# Patient Record
Sex: FEMALE | Race: WHITE | NOT HISPANIC OR LATINO | Employment: FULL TIME | URBAN - METROPOLITAN AREA
[De-identification: names, ages, dates, MRNs, and addresses within clinical notes are randomized per-mention and may not be internally consistent; named-entity substitution may affect disease eponyms.]

---

## 2023-10-30 PROBLEM — G47.30 SLEEP APNEA: Status: ACTIVE | Noted: 2023-10-30

## 2023-10-30 PROBLEM — D12.6 TUBULAR ADENOMA OF COLON: Status: ACTIVE | Noted: 2023-10-30

## 2023-10-30 PROBLEM — E03.9 HYPOTHYROIDISM: Status: ACTIVE | Noted: 2023-10-30

## 2023-10-30 PROBLEM — G40.909 SEIZURE DISORDER (MULTI): Status: ACTIVE | Noted: 2023-10-30

## 2023-10-30 PROBLEM — E55.9 VITAMIN D DEFICIENCY: Status: ACTIVE | Noted: 2023-10-30

## 2023-10-30 PROBLEM — E78.5 HYPERLIPIDEMIA: Status: ACTIVE | Noted: 2023-10-30

## 2023-10-30 RX ORDER — ACETAMINOPHEN 500 MG
2000 TABLET ORAL DAILY
COMMUNITY
Start: 2018-02-19 | End: 2023-11-07

## 2023-11-07 ENCOUNTER — OFFICE VISIT (OUTPATIENT)
Dept: OBSTETRICS AND GYNECOLOGY | Facility: CLINIC | Age: 63
End: 2023-11-07
Payer: COMMERCIAL

## 2023-11-07 VITALS
DIASTOLIC BLOOD PRESSURE: 74 MMHG | BODY MASS INDEX: 29.82 KG/M2 | WEIGHT: 190 LBS | HEIGHT: 67 IN | SYSTOLIC BLOOD PRESSURE: 136 MMHG

## 2023-11-07 DIAGNOSIS — Z12.11 SCREEN FOR COLON CANCER: Primary | ICD-10-CM

## 2023-11-07 DIAGNOSIS — Z01.419 ENCOUNTER FOR GYNECOLOGICAL EXAMINATION WITHOUT ABNORMAL FINDING: ICD-10-CM

## 2023-11-07 DIAGNOSIS — Z78.0 MENOPAUSE: ICD-10-CM

## 2023-11-07 PROCEDURE — 1036F TOBACCO NON-USER: CPT | Performed by: OBSTETRICS & GYNECOLOGY

## 2023-11-07 PROCEDURE — 99396 PREV VISIT EST AGE 40-64: CPT | Performed by: OBSTETRICS & GYNECOLOGY

## 2023-11-07 RX ORDER — CALCIUM CARBONATE 600 MG
1 TABLET ORAL DAILY
COMMUNITY
Start: 2018-02-19

## 2023-11-07 RX ORDER — UBIDECARENONE 30 MG
CAPSULE ORAL
COMMUNITY

## 2023-11-07 RX ORDER — DIVALPROEX SODIUM 250 MG/1
TABLET, DELAYED RELEASE ORAL
COMMUNITY
Start: 2007-06-18 | End: 2023-11-16 | Stop reason: SDUPTHER

## 2023-11-07 NOTE — PROGRESS NOTES
Subjective   Sofya Dean is a 63 y.o. female here for a routine exam.  She has a history of osteoporosis.  She denies postmenopausal bleeding or vaginal discharge.  No dysuria or change in bowel habits.  She is due for colonoscopy next year.     personal health questionnaire reviewed: not asked.     Gynecologic History  No LMP recorded. Patient is postmenopausal.  Contraception: post menopausal status  Last Pap: 22. Results were: normal  Last mammogram: 22. Results were: normal    Obstetric History  OB History    Para Term  AB Living   3 2           SAB IAB Ectopic Multiple Live Births                  # Outcome Date GA Lbr Dipesh/2nd Weight Sex Delivery Anes PTL Lv   3             2 Para            1 Para                Objective   Constitutional: Alert and in no acute distress. Well developed, well nourished.   Head and Face: Head and face: Normal.    Eyes: Normal external exam - nonicteric sclera, extraocular movements intact (EOMI) and no ptosis.   Neck: No neck asymmetry. Supple. Thyroid not enlarged and there were no palpable thyroid nodules.    Pulmonary: No respiratory distress.   Chest: Breasts: Normal appearance, no nipple discharge and no skin changes. Palpation of breasts and axillae: No palpable mass and no axillary lymphadenopathy.  Bilateral breast implants noted.  Abdomen: Soft nontender; no abdominal mass palpated. No organomegaly. No hernias.   Genitourinary: External genitalia: Normal. No inguinal lymphadenopathy. Bartholin's Urethral and Skenes Glands: Normal. Urethra: Normal.  Bladder: Normal on palpation. Vagina: Normal. Cervix: Normal. No pap sent. Uterus: Normal.  Right Adnexa/parametria: Normal.  Left Adnexa/parametria: Normal.  Inspection of Perianal Area: Normal.   Musculoskeletal: No joint swelling seen, normal movements of all extremities.   Skin: Normal skin color and pigmentation, normal skin turgor, and no rash.   Neurologic: Non-focal. Grossly intact.    Psychiatric: Alert and oriented x 3. Affect normal to patient baseline. Mood: Appropriate.  Physical Exam     Assessment/Plan   Healthy female exam.  This is a 63-year-old female with a normal exam.  No Pap was sent, she is high risk HPV negative.  Her routine mammogram was ordered with tomosynthesis.  She is due for a colonoscopy and bone density in May 2024 and these were ordered.  I will see her in 1 year.  Mammogram ordered.

## 2023-11-16 DIAGNOSIS — G40.909 SEIZURE DISORDER (MULTI): Primary | ICD-10-CM

## 2023-11-16 RX ORDER — DIVALPROEX SODIUM 250 MG/1
TABLET, DELAYED RELEASE ORAL
Qty: 90 TABLET | Refills: 11 | Status: SHIPPED | OUTPATIENT
Start: 2023-11-16 | End: 2023-11-16 | Stop reason: ALTCHOICE

## 2023-11-16 RX ORDER — DIVALPROEX SODIUM 250 MG/1
TABLET, FILM COATED, EXTENDED RELEASE ORAL
COMMUNITY
End: 2023-11-16 | Stop reason: SDUPTHER

## 2023-11-16 RX ORDER — DIVALPROEX SODIUM 250 MG/1
TABLET, FILM COATED, EXTENDED RELEASE ORAL
Qty: 90 TABLET | Refills: 11 | Status: SHIPPED | OUTPATIENT
Start: 2023-11-16 | End: 2023-12-19 | Stop reason: SDUPTHER

## 2023-12-19 DIAGNOSIS — G40.909 SEIZURE DISORDER (MULTI): ICD-10-CM

## 2023-12-19 RX ORDER — DIVALPROEX SODIUM 250 MG/1
TABLET, FILM COATED, EXTENDED RELEASE ORAL
Qty: 270 TABLET | Refills: 3 | Status: SHIPPED | OUTPATIENT
Start: 2023-12-19 | End: 2024-12-18

## 2024-01-09 ENCOUNTER — TELEPHONE (OUTPATIENT)
Dept: OBSTETRICS AND GYNECOLOGY | Facility: CLINIC | Age: 64
End: 2024-01-09

## 2024-01-09 DIAGNOSIS — Z12.31 SCREENING MAMMOGRAM FOR BREAST CANCER: Primary | ICD-10-CM

## 2024-01-09 NOTE — TELEPHONE ENCOUNTER
Patients need another requisition for a mammogram. States that one she has will  before she is able to get in for visit.

## 2024-01-16 ENCOUNTER — HOSPITAL ENCOUNTER (OUTPATIENT)
Dept: RADIOLOGY | Facility: HOSPITAL | Age: 64
Discharge: HOME | End: 2024-01-16
Payer: COMMERCIAL

## 2024-01-16 DIAGNOSIS — Z01.419 ENCOUNTER FOR GYNECOLOGICAL EXAMINATION WITHOUT ABNORMAL FINDING: ICD-10-CM

## 2024-01-16 PROCEDURE — 77067 SCR MAMMO BI INCL CAD: CPT | Performed by: RADIOLOGY

## 2024-01-16 PROCEDURE — 77063 BREAST TOMOSYNTHESIS BI: CPT | Performed by: RADIOLOGY

## 2024-01-16 PROCEDURE — 77067 SCR MAMMO BI INCL CAD: CPT

## 2024-04-12 DIAGNOSIS — Z12.11 COLON CANCER SCREENING: ICD-10-CM

## 2024-04-12 RX ORDER — POLYETHYLENE GLYCOL 3350, SODIUM SULFATE ANHYDROUS, SODIUM BICARBONATE, SODIUM CHLORIDE, POTASSIUM CHLORIDE 236; 22.74; 6.74; 5.86; 2.97 G/4L; G/4L; G/4L; G/4L; G/4L
POWDER, FOR SOLUTION ORAL
Qty: 4000 ML | Refills: 0 | Status: SHIPPED | OUTPATIENT
Start: 2024-04-12

## 2024-05-29 DIAGNOSIS — Z12.11 COLON CANCER SCREENING: Primary | ICD-10-CM

## 2024-05-29 RX ORDER — SODIUM, POTASSIUM,MAG SULFATES 17.5-3.13G
SOLUTION, RECONSTITUTED, ORAL ORAL
Qty: 354 ML | Refills: 0 | Status: SHIPPED | OUTPATIENT
Start: 2024-05-29

## 2024-06-04 ENCOUNTER — TELEPHONE (OUTPATIENT)
Dept: GASTROENTEROLOGY | Facility: HOSPITAL | Age: 64
End: 2024-06-04
Payer: COMMERCIAL

## 2024-06-04 NOTE — TELEPHONE ENCOUNTER
----- Message from Lindsey Lind MA sent at 6/4/2024  9:43 AM EDT -----  Regarding: Prep  Patient has colonoscopy tomorrow, was given Suprep, she is reading the insert and it talks about taking it with seizure disorder could be a problem and she is epileptic.  Asking if ok to take.....   [phone]      I discussed the bowel preparation for colonoscopy and stated that any of the preparations can cause dehydration, low sodium, potassium and potential seizures.  I have highly recommended that she drink plenty of fluids today and remain hydrated.  She will split the preparation as scheduled.

## 2024-06-05 ENCOUNTER — OFFICE VISIT (OUTPATIENT)
Dept: GASTROENTEROLOGY | Facility: EXTERNAL LOCATION | Age: 64
End: 2024-06-05
Payer: MEDICARE

## 2024-06-05 DIAGNOSIS — Z12.11 SCREEN FOR COLON CANCER: ICD-10-CM

## 2024-06-05 DIAGNOSIS — Z86.010 HX OF COLONIC POLYPS: ICD-10-CM

## 2024-06-05 DIAGNOSIS — Z12.11 SCREEN FOR COLON CANCER: Primary | ICD-10-CM

## 2024-06-05 DIAGNOSIS — D12.3 BENIGN NEOPLASM OF TRANSVERSE COLON: ICD-10-CM

## 2024-06-05 PROCEDURE — 45385 COLONOSCOPY W/LESION REMOVAL: CPT | Performed by: INTERNAL MEDICINE

## 2024-06-05 PROCEDURE — 88305 TISSUE EXAM BY PATHOLOGIST: CPT

## 2024-06-05 NOTE — PROGRESS NOTES
Patient colonoscopy for surveillance and 2 polyps were removed.  Diverticulosis and hemorrhoids were present.  Repeat exam 5 years.

## 2024-06-06 ENCOUNTER — LAB REQUISITION (OUTPATIENT)
Dept: LAB | Facility: HOSPITAL | Age: 64
End: 2024-06-06
Payer: MEDICARE

## 2024-06-12 LAB
LABORATORY COMMENT REPORT: NORMAL
PATH REPORT.FINAL DX SPEC: NORMAL
PATH REPORT.GROSS SPEC: NORMAL
PATH REPORT.RELEVANT HX SPEC: NORMAL
PATH REPORT.TOTAL CANCER: NORMAL

## 2024-07-03 ENCOUNTER — APPOINTMENT (OUTPATIENT)
Dept: PRIMARY CARE | Facility: CLINIC | Age: 64
End: 2024-07-03
Payer: MEDICARE

## 2024-07-03 VITALS
WEIGHT: 199.3 LBS | BODY MASS INDEX: 31.28 KG/M2 | OXYGEN SATURATION: 97 % | HEART RATE: 75 BPM | SYSTOLIC BLOOD PRESSURE: 130 MMHG | HEIGHT: 67 IN | DIASTOLIC BLOOD PRESSURE: 72 MMHG

## 2024-07-03 DIAGNOSIS — Z00.00 ROUTINE GENERAL MEDICAL EXAMINATION AT HEALTH CARE FACILITY: Primary | ICD-10-CM

## 2024-07-03 DIAGNOSIS — Z13.6 SCREENING FOR CARDIOVASCULAR CONDITION: ICD-10-CM

## 2024-07-03 DIAGNOSIS — E78.2 MIXED HYPERLIPIDEMIA: ICD-10-CM

## 2024-07-03 DIAGNOSIS — Z00.00 MEDICARE ANNUAL WELLNESS VISIT, INITIAL: ICD-10-CM

## 2024-07-03 PROCEDURE — 99397 PER PM REEVAL EST PAT 65+ YR: CPT | Performed by: PHYSICIAN ASSISTANT

## 2024-07-03 PROCEDURE — 1160F RVW MEDS BY RX/DR IN RCRD: CPT | Performed by: PHYSICIAN ASSISTANT

## 2024-07-03 PROCEDURE — 1170F FXNL STATUS ASSESSED: CPT | Performed by: PHYSICIAN ASSISTANT

## 2024-07-03 PROCEDURE — 1036F TOBACCO NON-USER: CPT | Performed by: PHYSICIAN ASSISTANT

## 2024-07-03 PROCEDURE — G0402 INITIAL PREVENTIVE EXAM: HCPCS | Performed by: PHYSICIAN ASSISTANT

## 2024-07-03 PROCEDURE — G0403 EKG FOR INITIAL PREVENT EXAM: HCPCS | Performed by: PHYSICIAN ASSISTANT

## 2024-07-03 PROCEDURE — 1159F MED LIST DOCD IN RCRD: CPT | Performed by: PHYSICIAN ASSISTANT

## 2024-07-03 RX ORDER — ROSUVASTATIN CALCIUM 10 MG/1
10 TABLET, COATED ORAL DAILY
Qty: 100 TABLET | Refills: 3 | Status: SHIPPED | OUTPATIENT
Start: 2024-07-03 | End: 2025-08-07

## 2024-07-03 ASSESSMENT — ENCOUNTER SYMPTOMS
MUSCULOSKELETAL NEGATIVE: 1
VOMITING: 0
ALLERGIC/IMMUNOLOGIC NEGATIVE: 1
ABDOMINAL PAIN: 0
LOSS OF SENSATION IN FEET: 0
PSYCHIATRIC NEGATIVE: 1
RESPIRATORY NEGATIVE: 1
PALPITATIONS: 0
EYES NEGATIVE: 1
NAUSEA: 0
NERVOUS/ANXIOUS: 0
CONSTITUTIONAL NEGATIVE: 1
HEADACHES: 0
OCCASIONAL FEELINGS OF UNSTEADINESS: 0
COUGH: 0
NEUROLOGICAL NEGATIVE: 1
DIZZINESS: 0
SHORTNESS OF BREATH: 0
ENDOCRINE NEGATIVE: 1
WHEEZING: 0
ARTHRALGIAS: 0
HEMATOLOGIC/LYMPHATIC NEGATIVE: 1
DEPRESSION: 0
BACK PAIN: 0

## 2024-07-03 ASSESSMENT — PATIENT HEALTH QUESTIONNAIRE - PHQ9
2. FEELING DOWN, DEPRESSED OR HOPELESS: NOT AT ALL
1. LITTLE INTEREST OR PLEASURE IN DOING THINGS: NOT AT ALL
2. FEELING DOWN, DEPRESSED OR HOPELESS: NOT AT ALL
1. LITTLE INTEREST OR PLEASURE IN DOING THINGS: NOT AT ALL
SUM OF ALL RESPONSES TO PHQ9 QUESTIONS 1 AND 2: 0
SUM OF ALL RESPONSES TO PHQ9 QUESTIONS 1 AND 2: 0

## 2024-07-03 ASSESSMENT — ACTIVITIES OF DAILY LIVING (ADL)
MANAGING_FINANCES: INDEPENDENT
DRESSING: INDEPENDENT
BATHING: INDEPENDENT
TAKING_MEDICATION: INDEPENDENT
DOING_HOUSEWORK: INDEPENDENT
GROCERY_SHOPPING: INDEPENDENT

## 2024-07-03 NOTE — PROGRESS NOTES
Subjective   Reason for Visit: Sofya Dean is an 64 y.o. female here for a Medicare Wellness visit.     Past Medical, Surgical, and Family History reviewed and updated in chart.    Reviewed all medications by prescribing practitioner or clinical pharmacist (such as prescriptions, OTCs, herbal therapies and supplements) and documented in the medical record.    HPI    Patient Sofya Dean 64 y.o. female is here today to establish care and medicare wellness initial     previous PCP  starr      - works part time window business-  retired [employer]     specialists - gyn - jerod- neurology - epilepsy   UTD dental and vision UTD     PMhx significant medical hx epilepsy -   PShx: breast implants   Social hx: etoh- social-few per month  , no tobacco use, no illicit drug use   Watches diet and exercise- walks   immunizations -pt declines all vaccines -- pt aware of risks   Past medical, surgical, social, and family history all reviewed     patient states feeling well otherwise  denies fever, chills, N/V, headache, dizziness, CP, SOB, palpitations, edema, numbness, tingling, weakness  A chaperone was offered to the patient for the physical exam /  exam and was declined     Colonoscopuy UTD 6/5/2024  Mammogram UTD     Patient Care Team:  Princess Aguiar PA-C as PCP - General (Internal Medicine)  JOURDAN Contreras-CNP as PCP - [employer] PCP     Review of Systems   Constitutional: Negative.    HENT: Negative.     Eyes: Negative.    Respiratory: Negative.  Negative for cough, shortness of breath and wheezing.    Cardiovascular:  Negative for chest pain and palpitations.   Gastrointestinal:  Negative for abdominal pain, nausea and vomiting.   Endocrine: Negative.    Genitourinary: Negative.    Musculoskeletal: Negative.  Negative for arthralgias and back pain.   Skin: Negative.  Negative for rash.   Allergic/Immunologic: Negative.    Neurological: Negative.  Negative for  "dizziness and headaches.        Hx epilepsy    Hematological: Negative.    Psychiatric/Behavioral: Negative.  The patient is not nervous/anxious.        Objective   Vitals:  /72 (BP Location: Right arm, Patient Position: Sitting)   Pulse 75   Ht 1.702 m (5' 7\")   Wt 90.4 kg (199 lb 4.7 oz)   SpO2 97%   BMI 31.21 kg/m²       Physical Exam  Vitals and nursing note reviewed.   Constitutional:       Appearance: Normal appearance. She is normal weight.   HENT:      Head: Normocephalic and atraumatic.      Right Ear: Tympanic membrane, ear canal and external ear normal.      Left Ear: Tympanic membrane, ear canal and external ear normal.      Nose: Nose normal.      Mouth/Throat:      Mouth: Mucous membranes are moist.      Pharynx: Oropharynx is clear.   Eyes:      Extraocular Movements: Extraocular movements intact.      Pupils: Pupils are equal, round, and reactive to light.   Cardiovascular:      Rate and Rhythm: Normal rate and regular rhythm.      Heart sounds: Normal heart sounds.   Pulmonary:      Effort: Pulmonary effort is normal.      Breath sounds: Normal breath sounds.   Abdominal:      General: Abdomen is flat. Bowel sounds are normal.      Palpations: Abdomen is soft.   Genitourinary:     Comments: Per GYN   Musculoskeletal:         General: Normal range of motion.      Cervical back: Normal range of motion and neck supple.   Skin:     General: Skin is warm and dry.   Neurological:      General: No focal deficit present.      Mental Status: She is alert and oriented to person, place, and time.   Psychiatric:         Mood and Affect: Mood normal.         Behavior: Behavior normal.         Thought Content: Thought content normal.         Judgment: Judgment normal.       Assessment/Plan   Problem List Items Addressed This Visit       Hyperlipidemia    Relevant Medications    rosuvastatin (Crestor) 10 mg tablet    Other Relevant Orders    Comprehensive Metabolic Panel     Other Visit Diagnoses       " Routine general medical examination at health care facility    -  Primary    Relevant Orders    1 Year Follow Up In Primary Care - Wellness Exam    Hepatitis C Antibody    TSH with reflex to Free T4 if abnormal    Medicare annual wellness visit, initial        Screening for cardiovascular condition        Relevant Orders    ECG 12 lead    Lipid panel    CT cardiac scoring wo IV contrast          Est/ MCE initial   -  patient stable and healthy  -  discussed healthy diet and exercise plan   -  continue medications as directed, SEs, risks and options discussed   -  f/u with specialists as directed   -  UTD on dental and vision exams, f/u as directed   -  Labs ordered today, pt advised to call office when results are available to discuss with provider    -  EKG ordered today  -Vaccinations recommended today: Influenza, Prevnar, pneumococcal, TDap  -Follow-up annual exam in one year  -Colon cancer screening UTD   - mammogram UTD   -Follow-up in one week to discuss all results      - start crestor 10mg   -Encouraged following a low-fat low-cholesterol diet   -Discussed the benefits of regular aerobic exercise and weight loss  -Encouraged following a low-carb healthy oil intake diet      Medicare Wellness Billing Compliance Satisfied    *This is a visual tool to show completion of required items on the day of the visit. Green checks will only appear on the date of visit.    Review all medications by prescribing practitioner or clinical pharmacist (such as prescriptions, OTCs, herbal therapies and supplements) documented in the medical record    Past Medical, Surgical, and Family History reviewed and updated in chart    Tobacco Use Reviewed    Alcohol Use Reviewed    Illicit Drug Use Reviewed    PHQ2/9    Falls in Last Year Reviewed    Home Safety Risk Factors Reviewed    Cognitive Impairment Reviewed    Patient Self Assessment and Health Status    Current Diet Reviewed    Exercise Frequency    ADL - Hearing  Impairment    ADL - Bathing    ADL - Dressing    ADL - Walks in Home    IADL - Managing Finances    IADL - Grocery Shopping    IADL - Taking Medications    IADL - Doing Housework    Vision Screening       Total appt time today was 45+ minutes. Time included preparing to see the pt, obtaining the hx, performing the medically necessary appropriate physical exam, counseling & educating the pt, ordering tests & procedures, referring & communicating w/other providers, independently interpreting results & communicating the results to the pt, care, coordination & documenting clinical information in the medical record.

## 2024-07-19 ENCOUNTER — LAB (OUTPATIENT)
Dept: LAB | Facility: LAB | Age: 64
End: 2024-07-19
Payer: MEDICARE

## 2024-07-19 ENCOUNTER — HOSPITAL ENCOUNTER (OUTPATIENT)
Dept: RADIOLOGY | Facility: HOSPITAL | Age: 64
Discharge: HOME | End: 2024-07-19
Payer: MEDICARE

## 2024-07-19 DIAGNOSIS — Z00.00 ROUTINE GENERAL MEDICAL EXAMINATION AT HEALTH CARE FACILITY: ICD-10-CM

## 2024-07-19 DIAGNOSIS — Z13.6 SCREENING FOR CARDIOVASCULAR CONDITION: ICD-10-CM

## 2024-07-19 DIAGNOSIS — E78.2 MIXED HYPERLIPIDEMIA: ICD-10-CM

## 2024-07-19 LAB
ALBUMIN SERPL BCP-MCNC: 4.5 G/DL (ref 3.4–5)
ALP SERPL-CCNC: 56 U/L (ref 33–136)
ALT SERPL W P-5'-P-CCNC: 21 U/L (ref 7–45)
ANION GAP SERPL CALC-SCNC: 12 MMOL/L (ref 10–20)
AST SERPL W P-5'-P-CCNC: 17 U/L (ref 9–39)
BILIRUB SERPL-MCNC: 0.4 MG/DL (ref 0–1.2)
BUN SERPL-MCNC: 18 MG/DL (ref 6–23)
CALCIUM SERPL-MCNC: 9.8 MG/DL (ref 8.6–10.3)
CHLORIDE SERPL-SCNC: 104 MMOL/L (ref 98–107)
CHOLEST SERPL-MCNC: 162 MG/DL (ref 0–199)
CHOLESTEROL/HDL RATIO: 3.2
CO2 SERPL-SCNC: 29 MMOL/L (ref 21–32)
CREAT SERPL-MCNC: 0.69 MG/DL (ref 0.5–1.05)
EGFRCR SERPLBLD CKD-EPI 2021: >90 ML/MIN/1.73M*2
GLUCOSE SERPL-MCNC: 89 MG/DL (ref 74–99)
HCV AB SER QL: NONREACTIVE
HDLC SERPL-MCNC: 50.7 MG/DL
LDLC SERPL CALC-MCNC: 85 MG/DL
NON HDL CHOLESTEROL: 111 MG/DL (ref 0–149)
POTASSIUM SERPL-SCNC: 4.7 MMOL/L (ref 3.5–5.3)
PROT SERPL-MCNC: 6.9 G/DL (ref 6.4–8.2)
SODIUM SERPL-SCNC: 140 MMOL/L (ref 136–145)
TRIGL SERPL-MCNC: 130 MG/DL (ref 0–149)
TSH SERPL-ACNC: 2.08 MIU/L (ref 0.44–3.98)
VLDL: 26 MG/DL (ref 0–40)

## 2024-07-19 PROCEDURE — 86803 HEPATITIS C AB TEST: CPT

## 2024-07-19 PROCEDURE — 75571 CT HRT W/O DYE W/CA TEST: CPT

## 2024-07-19 PROCEDURE — 36415 COLL VENOUS BLD VENIPUNCTURE: CPT

## 2024-07-25 ENCOUNTER — TELEPHONE (OUTPATIENT)
Dept: PRIMARY CARE | Facility: CLINIC | Age: 64
End: 2024-07-25
Payer: MEDICARE

## 2024-10-02 DIAGNOSIS — G40.909 SEIZURE DISORDER (MULTI): ICD-10-CM

## 2024-10-02 RX ORDER — DIVALPROEX SODIUM 250 MG/1
TABLET, FILM COATED, EXTENDED RELEASE ORAL
Qty: 270 TABLET | Refills: 3 | Status: SHIPPED | OUTPATIENT
Start: 2024-10-02 | End: 2025-10-02

## 2024-11-11 ENCOUNTER — APPOINTMENT (OUTPATIENT)
Dept: OBSTETRICS AND GYNECOLOGY | Facility: CLINIC | Age: 64
End: 2024-11-11
Payer: MEDICARE

## 2024-11-11 VITALS
WEIGHT: 203 LBS | DIASTOLIC BLOOD PRESSURE: 78 MMHG | SYSTOLIC BLOOD PRESSURE: 130 MMHG | HEART RATE: 83 BPM | BODY MASS INDEX: 31.86 KG/M2 | HEIGHT: 67 IN

## 2024-11-11 DIAGNOSIS — Z01.419 ENCOUNTER FOR GYNECOLOGICAL EXAMINATION WITHOUT ABNORMAL FINDING: ICD-10-CM

## 2024-11-11 DIAGNOSIS — Z12.31 ENCOUNTER FOR SCREENING MAMMOGRAM FOR MALIGNANT NEOPLASM OF BREAST: ICD-10-CM

## 2024-11-11 DIAGNOSIS — M81.0 AGE-RELATED OSTEOPOROSIS WITHOUT CURRENT PATHOLOGICAL FRACTURE: Primary | ICD-10-CM

## 2024-11-11 PROCEDURE — G2211 COMPLEX E/M VISIT ADD ON: HCPCS | Performed by: OBSTETRICS & GYNECOLOGY

## 2024-11-11 PROCEDURE — 3008F BODY MASS INDEX DOCD: CPT | Performed by: OBSTETRICS & GYNECOLOGY

## 2024-11-11 PROCEDURE — 99214 OFFICE O/P EST MOD 30 MIN: CPT | Performed by: OBSTETRICS & GYNECOLOGY

## 2024-11-11 PROCEDURE — 1159F MED LIST DOCD IN RCRD: CPT | Performed by: OBSTETRICS & GYNECOLOGY

## 2024-11-11 NOTE — PROGRESS NOTES
Subjective   Sofya Dean is a 64 y.o. female here for GYN care.  She has no postmenopausal bleeding or discharge.  No dysuria or change in bowel habits.  She is current on her colonoscopy.    She had a bone density in 2017 that showed osteoporosis.    Personal health questionnaire reviewed: yes.     Gynecologic History  No LMP recorded. Patient is postmenopausal.  Contraception: Ortho-Evra patches weekly and post menopausal status  Last Pap: 22. Results were: normal  Last mammogram: 24. Results were: normal    Obstetric History  OB History    Para Term  AB Living   3 2 2         SAB IAB Ectopic Multiple Live Births                  # Outcome Date GA Lbr Dipesh/2nd Weight Sex Type Anes PTL Lv   3             2 Term            1 Term                Objective   Constitutional: Alert and in no acute distress. Well developed, well nourished.   Head and Face: Head and face: Normal.    Eyes: Normal external exam - nonicteric sclera, extraocular movements intact (EOMI) and no ptosis.   Neck: No neck asymmetry. Supple. Thyroid not enlarged and there were no palpable thyroid nodules.    Pulmonary: No respiratory distress.   Chest: Breasts: Normal appearance, no nipple discharge and no skin changes. Palpation of breasts and axillae: No palpable mass and no axillary lymphadenopathy.   Abdomen: Soft nontender; no abdominal mass palpated. No organomegaly. No hernias.   Genitourinary: External genitalia: Normal. No inguinal lymphadenopathy. Bartholin's Urethral and Skenes Glands: Normal. Urethra: Normal.  Bladder: Normal on palpation. Vagina: Normal. Cervix: Normal.  Uterus: Normal.  Right Adnexa/parametria: Normal.  Left Adnexa/parametria: Normal.  Inspection of Perianal Area: Normal.   Musculoskeletal: No joint swelling seen, normal movements of all extremities.   Skin: Normal skin color and pigmentation, normal skin turgor, and no rash.   Neurologic: Non-focal. Grossly intact.   Psychiatric:  Alert and oriented x 3. Affect normal to patient baseline. Mood: Appropriate.  Physical Exam     Assessment/Plan   This is a 64-year-old female with a normal exam.  No Pap smear was sent, she is high risk HPV negative in 2022.    Her routine mammogram was ordered with tomosynthesis.    I do recommend obtaining a bone density to monitor the osteoporosis.  I recommend dietary calcium, vitamin D supplement and weightbearing exercise for bone health.    I will see her in 2 years, or sooner as needed.    Mammogram ordered.

## 2024-12-04 ENCOUNTER — HOSPITAL ENCOUNTER (INPATIENT)
Facility: HOSPITAL | Age: 64
LOS: 2 days | Discharge: HOME | End: 2024-12-06
Attending: INTERNAL MEDICINE | Admitting: INTERNAL MEDICINE
Payer: MEDICARE

## 2024-12-04 ENCOUNTER — APPOINTMENT (OUTPATIENT)
Dept: RADIOLOGY | Facility: HOSPITAL | Age: 64
End: 2024-12-04
Payer: MEDICARE

## 2024-12-04 ENCOUNTER — TELEPHONE (OUTPATIENT)
Dept: PRIMARY CARE | Facility: CLINIC | Age: 64
End: 2024-12-04
Payer: MEDICARE

## 2024-12-04 DIAGNOSIS — N20.0 URINARY TRACT OBSTRUCTION BY KIDNEY STONE: Primary | ICD-10-CM

## 2024-12-04 DIAGNOSIS — N13.8 URINARY TRACT OBSTRUCTION BY KIDNEY STONE: Primary | ICD-10-CM

## 2024-12-04 DIAGNOSIS — N20.0 NEPHROLITHIASIS: ICD-10-CM

## 2024-12-04 DIAGNOSIS — N12 PYELONEPHRITIS: ICD-10-CM

## 2024-12-04 LAB
ALBUMIN SERPL BCP-MCNC: 4.5 G/DL (ref 3.4–5)
ALP SERPL-CCNC: 49 U/L (ref 33–136)
ALT SERPL W P-5'-P-CCNC: 15 U/L (ref 7–45)
ANION GAP SERPL CALC-SCNC: 12 MMOL/L (ref 10–20)
APPEARANCE UR: ABNORMAL
AST SERPL W P-5'-P-CCNC: 14 U/L (ref 9–39)
BASOPHILS # BLD AUTO: 0.03 X10*3/UL (ref 0–0.1)
BASOPHILS NFR BLD AUTO: 0.3 %
BILIRUB SERPL-MCNC: 0.4 MG/DL (ref 0–1.2)
BILIRUB UR STRIP.AUTO-MCNC: NEGATIVE MG/DL
BUN SERPL-MCNC: 25 MG/DL (ref 6–23)
CALCIUM SERPL-MCNC: 11.5 MG/DL (ref 8.6–10.3)
CHLORIDE SERPL-SCNC: 105 MMOL/L (ref 98–107)
CO2 SERPL-SCNC: 29 MMOL/L (ref 21–32)
COLOR UR: YELLOW
CREAT SERPL-MCNC: 0.78 MG/DL (ref 0.5–1.05)
EGFRCR SERPLBLD CKD-EPI 2021: 84 ML/MIN/1.73M*2
EOSINOPHIL # BLD AUTO: 0.2 X10*3/UL (ref 0–0.7)
EOSINOPHIL NFR BLD AUTO: 1.7 %
ERYTHROCYTE [DISTWIDTH] IN BLOOD BY AUTOMATED COUNT: 13.8 % (ref 11.5–14.5)
GLUCOSE SERPL-MCNC: 111 MG/DL (ref 74–99)
GLUCOSE UR STRIP.AUTO-MCNC: NORMAL MG/DL
HCT VFR BLD AUTO: 42.2 % (ref 36–46)
HGB BLD-MCNC: 13.5 G/DL (ref 12–16)
IMM GRANULOCYTES # BLD AUTO: 0.06 X10*3/UL (ref 0–0.7)
IMM GRANULOCYTES NFR BLD AUTO: 0.5 % (ref 0–0.9)
KETONES UR STRIP.AUTO-MCNC: ABNORMAL MG/DL
LACTATE SERPL-SCNC: 1.5 MMOL/L (ref 0.4–2)
LEUKOCYTE ESTERASE UR QL STRIP.AUTO: ABNORMAL
LIPASE SERPL-CCNC: 26 U/L (ref 9–82)
LYMPHOCYTES # BLD AUTO: 2.64 X10*3/UL (ref 1.2–4.8)
LYMPHOCYTES NFR BLD AUTO: 22.8 %
MAGNESIUM SERPL-MCNC: 1.72 MG/DL (ref 1.6–2.4)
MCH RBC QN AUTO: 29.8 PG (ref 26–34)
MCHC RBC AUTO-ENTMCNC: 32 G/DL (ref 32–36)
MCV RBC AUTO: 93 FL (ref 80–100)
MONOCYTES # BLD AUTO: 0.76 X10*3/UL (ref 0.1–1)
MONOCYTES NFR BLD AUTO: 6.6 %
MUCOUS THREADS #/AREA URNS AUTO: ABNORMAL /LPF
NEUTROPHILS # BLD AUTO: 7.87 X10*3/UL (ref 1.2–7.7)
NEUTROPHILS NFR BLD AUTO: 68.1 %
NITRITE UR QL STRIP.AUTO: NEGATIVE
NRBC BLD-RTO: 0 /100 WBCS (ref 0–0)
PH UR STRIP.AUTO: 5.5 [PH]
PLATELET # BLD AUTO: 226 X10*3/UL (ref 150–450)
POTASSIUM SERPL-SCNC: 4.1 MMOL/L (ref 3.5–5.3)
PROT SERPL-MCNC: 7.2 G/DL (ref 6.4–8.2)
PROT UR STRIP.AUTO-MCNC: ABNORMAL MG/DL
RBC # BLD AUTO: 4.53 X10*6/UL (ref 4–5.2)
RBC # UR STRIP.AUTO: ABNORMAL /UL
RBC #/AREA URNS AUTO: >20 /HPF
SODIUM SERPL-SCNC: 142 MMOL/L (ref 136–145)
SP GR UR STRIP.AUTO: 1.02
SQUAMOUS #/AREA URNS AUTO: ABNORMAL /HPF
UROBILINOGEN UR STRIP.AUTO-MCNC: NORMAL MG/DL
WBC # BLD AUTO: 11.6 X10*3/UL (ref 4.4–11.3)
WBC #/AREA URNS AUTO: ABNORMAL /HPF
YEAST BUDDING #/AREA UR COMP ASSIST: PRESENT /HPF

## 2024-12-04 PROCEDURE — 2500000004 HC RX 250 GENERAL PHARMACY W/ HCPCS (ALT 636 FOR OP/ED)

## 2024-12-04 PROCEDURE — 99222 1ST HOSP IP/OBS MODERATE 55: CPT | Performed by: NURSE PRACTITIONER

## 2024-12-04 PROCEDURE — 83735 ASSAY OF MAGNESIUM: CPT | Performed by: INTERNAL MEDICINE

## 2024-12-04 PROCEDURE — 85025 COMPLETE CBC W/AUTO DIFF WBC: CPT | Performed by: INTERNAL MEDICINE

## 2024-12-04 PROCEDURE — 96366 THER/PROPH/DIAG IV INF ADDON: CPT

## 2024-12-04 PROCEDURE — 99285 EMERGENCY DEPT VISIT HI MDM: CPT | Mod: 25 | Performed by: INTERNAL MEDICINE

## 2024-12-04 PROCEDURE — 2500000001 HC RX 250 WO HCPCS SELF ADMINISTERED DRUGS (ALT 637 FOR MEDICARE OP): Performed by: NURSE PRACTITIONER

## 2024-12-04 PROCEDURE — 2500000002 HC RX 250 W HCPCS SELF ADMINISTERED DRUGS (ALT 637 FOR MEDICARE OP, ALT 636 FOR OP/ED)

## 2024-12-04 PROCEDURE — 1100000001 HC PRIVATE ROOM DAILY

## 2024-12-04 PROCEDURE — 87086 URINE CULTURE/COLONY COUNT: CPT | Mod: AHULAB | Performed by: INTERNAL MEDICINE

## 2024-12-04 PROCEDURE — 83690 ASSAY OF LIPASE: CPT | Performed by: INTERNAL MEDICINE

## 2024-12-04 PROCEDURE — 74176 CT ABD & PELVIS W/O CONTRAST: CPT | Performed by: RADIOLOGY

## 2024-12-04 PROCEDURE — 81001 URINALYSIS AUTO W/SCOPE: CPT | Performed by: INTERNAL MEDICINE

## 2024-12-04 PROCEDURE — 83605 ASSAY OF LACTIC ACID: CPT | Performed by: INTERNAL MEDICINE

## 2024-12-04 PROCEDURE — 36415 COLL VENOUS BLD VENIPUNCTURE: CPT | Performed by: INTERNAL MEDICINE

## 2024-12-04 PROCEDURE — 96365 THER/PROPH/DIAG IV INF INIT: CPT

## 2024-12-04 PROCEDURE — 2500000004 HC RX 250 GENERAL PHARMACY W/ HCPCS (ALT 636 FOR OP/ED): Performed by: INTERNAL MEDICINE

## 2024-12-04 PROCEDURE — 87040 BLOOD CULTURE FOR BACTERIA: CPT | Mod: AHULAB | Performed by: INTERNAL MEDICINE

## 2024-12-04 PROCEDURE — 74176 CT ABD & PELVIS W/O CONTRAST: CPT

## 2024-12-04 PROCEDURE — 84075 ASSAY ALKALINE PHOSPHATASE: CPT | Performed by: INTERNAL MEDICINE

## 2024-12-04 PROCEDURE — 99222 1ST HOSP IP/OBS MODERATE 55: CPT

## 2024-12-04 PROCEDURE — 2500000004 HC RX 250 GENERAL PHARMACY W/ HCPCS (ALT 636 FOR OP/ED): Performed by: NURSE PRACTITIONER

## 2024-12-04 RX ORDER — BISMUTH SUBSALICYLATE 262 MG
1 TABLET,CHEWABLE ORAL DAILY
COMMUNITY

## 2024-12-04 RX ORDER — KETOROLAC TROMETHAMINE 30 MG/ML
15 INJECTION, SOLUTION INTRAMUSCULAR; INTRAVENOUS EVERY 6 HOURS PRN
Status: DISCONTINUED | OUTPATIENT
Start: 2024-12-04 | End: 2024-12-06 | Stop reason: HOSPADM

## 2024-12-04 RX ORDER — MORPHINE SULFATE 2 MG/ML
2 INJECTION, SOLUTION INTRAMUSCULAR; INTRAVENOUS EVERY 4 HOURS PRN
Status: DISCONTINUED | OUTPATIENT
Start: 2024-12-04 | End: 2024-12-06 | Stop reason: HOSPADM

## 2024-12-04 RX ORDER — DIVALPROEX SODIUM 250 MG/1
500 TABLET, FILM COATED, EXTENDED RELEASE ORAL DAILY
Status: DISCONTINUED | OUTPATIENT
Start: 2024-12-05 | End: 2024-12-06 | Stop reason: HOSPADM

## 2024-12-04 RX ORDER — SODIUM CHLORIDE 9 MG/ML
125 INJECTION, SOLUTION INTRAVENOUS CONTINUOUS
Status: ACTIVE | OUTPATIENT
Start: 2024-12-04 | End: 2024-12-05

## 2024-12-04 RX ORDER — CIPROFLOXACIN 2 MG/ML
400 INJECTION, SOLUTION INTRAVENOUS EVERY 12 HOURS
Status: DISCONTINUED | OUTPATIENT
Start: 2024-12-05 | End: 2024-12-06

## 2024-12-04 RX ORDER — ONDANSETRON HYDROCHLORIDE 2 MG/ML
4 INJECTION, SOLUTION INTRAVENOUS EVERY 6 HOURS PRN
Status: DISCONTINUED | OUTPATIENT
Start: 2024-12-04 | End: 2024-12-06 | Stop reason: HOSPADM

## 2024-12-04 RX ORDER — ENOXAPARIN SODIUM 100 MG/ML
40 INJECTION SUBCUTANEOUS EVERY 24 HOURS
Status: DISCONTINUED | OUTPATIENT
Start: 2024-12-04 | End: 2024-12-06 | Stop reason: HOSPADM

## 2024-12-04 RX ORDER — TAMSULOSIN HYDROCHLORIDE 0.4 MG/1
0.4 CAPSULE ORAL DAILY
Status: DISCONTINUED | OUTPATIENT
Start: 2024-12-04 | End: 2024-12-06 | Stop reason: HOSPADM

## 2024-12-04 RX ORDER — ACETAMINOPHEN 650 MG/1
650 SUPPOSITORY RECTAL EVERY 4 HOURS PRN
Status: DISCONTINUED | OUTPATIENT
Start: 2024-12-04 | End: 2024-12-06 | Stop reason: HOSPADM

## 2024-12-04 RX ORDER — ACETAMINOPHEN 325 MG/1
650 TABLET ORAL EVERY 4 HOURS PRN
Status: DISCONTINUED | OUTPATIENT
Start: 2024-12-04 | End: 2024-12-06 | Stop reason: HOSPADM

## 2024-12-04 RX ORDER — ACETAMINOPHEN 160 MG/5ML
650 SOLUTION ORAL EVERY 4 HOURS PRN
Status: DISCONTINUED | OUTPATIENT
Start: 2024-12-04 | End: 2024-12-06 | Stop reason: HOSPADM

## 2024-12-04 RX ORDER — DIVALPROEX SODIUM 250 MG/1
250 TABLET, FILM COATED, EXTENDED RELEASE ORAL NIGHTLY
Status: DISCONTINUED | OUTPATIENT
Start: 2024-12-04 | End: 2024-12-06 | Stop reason: HOSPADM

## 2024-12-04 RX ORDER — CIPROFLOXACIN 2 MG/ML
400 INJECTION, SOLUTION INTRAVENOUS ONCE
Status: COMPLETED | OUTPATIENT
Start: 2024-12-04 | End: 2024-12-04

## 2024-12-04 SDOH — SOCIAL STABILITY: SOCIAL INSECURITY: WERE YOU ABLE TO COMPLETE ALL THE BEHAVIORAL HEALTH SCREENINGS?: YES

## 2024-12-04 SDOH — SOCIAL STABILITY: SOCIAL INSECURITY
WITHIN THE LAST YEAR, HAVE YOU BEEN RAPED OR FORCED TO HAVE ANY KIND OF SEXUAL ACTIVITY BY YOUR PARTNER OR EX-PARTNER?: NO

## 2024-12-04 SDOH — SOCIAL STABILITY: SOCIAL INSECURITY: HAVE YOU HAD THOUGHTS OF HARMING ANYONE ELSE?: NO

## 2024-12-04 SDOH — ECONOMIC STABILITY: FOOD INSECURITY: WITHIN THE PAST 12 MONTHS, THE FOOD YOU BOUGHT JUST DIDN'T LAST AND YOU DIDN'T HAVE MONEY TO GET MORE.: NEVER TRUE

## 2024-12-04 SDOH — SOCIAL STABILITY: SOCIAL INSECURITY: HAS ANYONE EVER THREATENED TO HURT YOUR FAMILY OR YOUR PETS?: NO

## 2024-12-04 SDOH — ECONOMIC STABILITY: FOOD INSECURITY: WITHIN THE PAST 12 MONTHS, YOU WORRIED THAT YOUR FOOD WOULD RUN OUT BEFORE YOU GOT THE MONEY TO BUY MORE.: NEVER TRUE

## 2024-12-04 SDOH — SOCIAL STABILITY: SOCIAL INSECURITY: HAVE YOU HAD ANY THOUGHTS OF HARMING ANYONE ELSE?: NO

## 2024-12-04 SDOH — SOCIAL STABILITY: SOCIAL INSECURITY
WITHIN THE LAST YEAR, HAVE YOU BEEN KICKED, HIT, SLAPPED, OR OTHERWISE PHYSICALLY HURT BY YOUR PARTNER OR EX-PARTNER?: NO

## 2024-12-04 SDOH — ECONOMIC STABILITY: INCOME INSECURITY: IN THE PAST 12 MONTHS HAS THE ELECTRIC, GAS, OIL, OR WATER COMPANY THREATENED TO SHUT OFF SERVICES IN YOUR HOME?: NO

## 2024-12-04 SDOH — SOCIAL STABILITY: SOCIAL INSECURITY: DO YOU FEEL UNSAFE GOING BACK TO THE PLACE WHERE YOU ARE LIVING?: NO

## 2024-12-04 SDOH — SOCIAL STABILITY: SOCIAL INSECURITY: ARE YOU OR HAVE YOU BEEN THREATENED OR ABUSED PHYSICALLY, EMOTIONALLY, OR SEXUALLY BY ANYONE?: NO

## 2024-12-04 SDOH — SOCIAL STABILITY: SOCIAL INSECURITY: DO YOU FEEL ANYONE HAS EXPLOITED OR TAKEN ADVANTAGE OF YOU FINANCIALLY OR OF YOUR PERSONAL PROPERTY?: NO

## 2024-12-04 SDOH — SOCIAL STABILITY: SOCIAL INSECURITY: WITHIN THE LAST YEAR, HAVE YOU BEEN HUMILIATED OR EMOTIONALLY ABUSED IN OTHER WAYS BY YOUR PARTNER OR EX-PARTNER?: NO

## 2024-12-04 SDOH — SOCIAL STABILITY: SOCIAL INSECURITY: WITHIN THE LAST YEAR, HAVE YOU BEEN AFRAID OF YOUR PARTNER OR EX-PARTNER?: NO

## 2024-12-04 SDOH — SOCIAL STABILITY: SOCIAL INSECURITY: ABUSE: ADULT

## 2024-12-04 SDOH — SOCIAL STABILITY: SOCIAL INSECURITY: DOES ANYONE TRY TO KEEP YOU FROM HAVING/CONTACTING OTHER FRIENDS OR DOING THINGS OUTSIDE YOUR HOME?: NO

## 2024-12-04 SDOH — SOCIAL STABILITY: SOCIAL INSECURITY: ARE THERE ANY APPARENT SIGNS OF INJURIES/BEHAVIORS THAT COULD BE RELATED TO ABUSE/NEGLECT?: NO

## 2024-12-04 ASSESSMENT — PATIENT HEALTH QUESTIONNAIRE - PHQ9
1. LITTLE INTEREST OR PLEASURE IN DOING THINGS: NOT AT ALL
SUM OF ALL RESPONSES TO PHQ9 QUESTIONS 1 & 2: 0
2. FEELING DOWN, DEPRESSED OR HOPELESS: NOT AT ALL

## 2024-12-04 ASSESSMENT — LIFESTYLE VARIABLES
AUDIT-C TOTAL SCORE: 0
HOW MANY STANDARD DRINKS CONTAINING ALCOHOL DO YOU HAVE ON A TYPICAL DAY: PATIENT DOES NOT DRINK
HOW OFTEN DO YOU HAVE 6 OR MORE DRINKS ON ONE OCCASION: NEVER
HOW OFTEN DO YOU HAVE A DRINK CONTAINING ALCOHOL: NEVER
AUDIT-C TOTAL SCORE: 0
SKIP TO QUESTIONS 9-10: 1

## 2024-12-04 ASSESSMENT — ACTIVITIES OF DAILY LIVING (ADL)
HEARING - RIGHT EAR: FUNCTIONAL
ADEQUATE_TO_COMPLETE_ADL: YES
PATIENT'S MEMORY ADEQUATE TO SAFELY COMPLETE DAILY ACTIVITIES?: YES
HEARING - LEFT EAR: FUNCTIONAL
BATHING: INDEPENDENT
JUDGMENT_ADEQUATE_SAFELY_COMPLETE_DAILY_ACTIVITIES: YES
LACK_OF_TRANSPORTATION: NO
TOILETING: INDEPENDENT
GROOMING: INDEPENDENT
FEEDING YOURSELF: INDEPENDENT
DRESSING YOURSELF: INDEPENDENT
WALKS IN HOME: INDEPENDENT

## 2024-12-04 ASSESSMENT — COGNITIVE AND FUNCTIONAL STATUS - GENERAL
PATIENT BASELINE BEDBOUND: NO
DAILY ACTIVITIY SCORE: 24
MOBILITY SCORE: 24

## 2024-12-04 ASSESSMENT — PAIN DESCRIPTION - PAIN TYPE: TYPE: ACUTE PAIN

## 2024-12-04 ASSESSMENT — PAIN SCALES - GENERAL
PAINLEVEL_OUTOF10: 10 - WORST POSSIBLE PAIN
PAINLEVEL_OUTOF10: 0 - NO PAIN

## 2024-12-04 ASSESSMENT — PAIN - FUNCTIONAL ASSESSMENT: PAIN_FUNCTIONAL_ASSESSMENT: 0-10

## 2024-12-04 ASSESSMENT — COLUMBIA-SUICIDE SEVERITY RATING SCALE - C-SSRS
6. HAVE YOU EVER DONE ANYTHING, STARTED TO DO ANYTHING, OR PREPARED TO DO ANYTHING TO END YOUR LIFE?: NO
2. HAVE YOU ACTUALLY HAD ANY THOUGHTS OF KILLING YOURSELF?: NO
1. IN THE PAST MONTH, HAVE YOU WISHED YOU WERE DEAD OR WISHED YOU COULD GO TO SLEEP AND NOT WAKE UP?: NO

## 2024-12-04 ASSESSMENT — PAIN DESCRIPTION - LOCATION: LOCATION: ABDOMEN

## 2024-12-04 NOTE — ED PROVIDER NOTES
HPI     CC: Abdominal Pain and Urinary Frequency     HPI: Sofya Dean is a 64 y.o. female with a history of recurrent UTI, epilepsy, presents with right flank pain and UTI symptoms.  Patient states that 2 days ago she developed slight right sided flank pain which became increasingly severe.  She had associated nausea.  She felt she had to have a bowel movement.  She was spitting into a bucket all day and vomited a few times.  She felt slightly better last night but then worsened again today.  Today she developed urinary frequency and some burning consistent with prior UTIs.  She has mild pain across her lower abdomen worse on the right lower quadrant.  She has no pain currently, it comes and goes but mostly comes.  She has not vomited today.  She denies any fevers or chills.  She has no history of prior kidney stones.    ROS: 10-point review of systems was performed and is otherwise negative except as noted in HPI.    Limitations to history: N/A    Independent Historians:  at bedside    External Records Reviewed: Outpatient notes in EMR    Past Medical History: Noncontributory except per HPI     Past Surgical History: Noncontributory except per HPI     Family History: Reviewed and noncontributory     Social History:  Denies tobacco. Denies ETOH. Denies illicit drugs.    Social Determinants Affecting Care: N/A    Allergies   Allergen Reactions    Cefadroxil Other    Chlorpheniramine-Pseudoephed Hives    Iodine Hives and Swelling       Home Meds:   Current Outpatient Medications   Medication Instructions    calcium carbonate 600 mg calcium (1,500 mg) tablet 1 tablet, Daily    divalproex (Depakote ER) 250 mg 24 hr tablet Take 2 tablets (500 mg) by mouth once daily in the morning AND 1 tablet (250 mg) once daily in the evening. Do not crush, chew, or split..    fish oil concentrate (Omega-3) 120-180 mg capsule 1 g, Daily    mv-min-folic acid-lutein (Essential Woman 50 Plus) 0.4-250 mg-mcg tablet Take by  "mouth.    polyethylene glycol-electrolytes (Golytely) 420 gram solution STARTING AT 6PM DRINK HALF OF THE BOTTLE, DRINK THE OTHER HALF 5 HRS BEFORE PROCEDURE TIME    rosuvastatin (CRESTOR) 10 mg, oral, Daily    sodium,potassium,mag sulfates (Suprep) 17.5-3.13-1.6 gram recon soln solution Take one bottle beginning at 6pm night before procedure and then take the other bottle 5 hours before procedure time as directed per instruction sheet        Physical Exam     ED Triage Vitals [12/04/24 1146]   Temperature Heart Rate Respirations BP   36.9 °C (98.5 °F) 82 18 174/80      Pulse Ox Temp Source Heart Rate Source Patient Position   98 % Temporal Monitor Sitting      BP Location FiO2 (%)     Left arm --         Heart Rate:  [74-93]   Temperature:  [36.9 °C (98.5 °F)]   Respirations:  [18]   BP: (124-174)/(55-88)   Height:  [170.2 cm (5' 7\")]   Weight:  [90.7 kg (200 lb)]   Pulse Ox:  [93 %-98 %]      Physical Exam  Vitals and nursing note reviewed.     CONSTITUTIONAL: Well appearing, well nourished, in no acute distress.   HENMT: Head atraumatic. Airway patent. Nasal mucosa clear. Mouth with normal mucosa, clear oropharynx. Uvula midline. Neck supple.    EYES: Clear bilaterally, pupils equally round and reactive to light.   CARDIOVASCULAR: Normal rate, regular rhythm.  Heart sounds S1, S2.  No murmurs, rubs or gallops. Normal pulses. Capillary refill < 2 sec.   RESPIRATORY: No increased work of breathing. Breath sounds clear and equal bilaterally.  GASTROINTESTINAL: Abdomen soft, non-distended, non-tender. No rebound, no guarding. Normal bowel sounds. No palpable masses.  GENITOURINARY:  No CVA tenderness.  MUSCULOSKELETAL: Spine appears normal, range of motion is not limited, no muscle or joint tenderness. No edema.   NEUROLOGICAL: Alert and oriented, no asymmetry, moving all extremities equally.  SKIN: Warm, dry and intact. No rash or notable lesions.  PSYCHIATRIC: Normal mood and affect.  HEME/LYMPH: No adenopathy or " splenomegaly.    Diagnostic Results      ECG: ECGs read and interpreted by me. See ED Course, below, for interpretation.    Labs Reviewed   CBC WITH AUTO DIFFERENTIAL - Abnormal       Result Value    WBC 11.6 (*)     nRBC 0.0      RBC 4.53      Hemoglobin 13.5      Hematocrit 42.2      MCV 93      MCH 29.8      MCHC 32.0      RDW 13.8      Platelets 226      Neutrophils % 68.1      Immature Granulocytes %, Automated 0.5      Lymphocytes % 22.8      Monocytes % 6.6      Eosinophils % 1.7      Basophils % 0.3      Neutrophils Absolute 7.87 (*)     Immature Granulocytes Absolute, Automated 0.06      Lymphocytes Absolute 2.64      Monocytes Absolute 0.76      Eosinophils Absolute 0.20      Basophils Absolute 0.03     COMPREHENSIVE METABOLIC PANEL - Abnormal    Glucose 111 (*)     Sodium 142      Potassium 4.1      Chloride 105      Bicarbonate 29      Anion Gap 12      Urea Nitrogen 25 (*)     Creatinine 0.78      eGFR 84      Calcium 11.5 (*)     Albumin 4.5      Alkaline Phosphatase 49      Total Protein 7.2      AST 14      Bilirubin, Total 0.4      ALT 15     URINALYSIS WITH REFLEX CULTURE AND MICROSCOPIC - Abnormal    Color, Urine Yellow      Appearance, Urine Turbid (*)     Specific Gravity, Urine 1.023      pH, Urine 5.5      Protein, Urine 20 (TRACE)      Glucose, Urine Normal      Blood, Urine 1.0 (3+) (*)     Ketones, Urine 10 (1+) (*)     Bilirubin, Urine NEGATIVE      Urobilinogen, Urine Normal      Nitrite, Urine NEGATIVE      Leukocyte Esterase, Urine 25 Tawnya/µL (*)    MICROSCOPIC ONLY, URINE - Abnormal    WBC, Urine 21-50 (*)     RBC, Urine >20 (*)     Squamous Epithelial Cells, Urine 1-9 (SPARSE)      Budding Yeast, Urine PRESENT (*)     Mucus, Urine FEW     MAGNESIUM - Normal    Magnesium 1.72     LIPASE - Normal    Lipase 26      Narrative:     Venipuncture immediately after or during the administration of Metamizole may lead to falsely low results. Testing should be performed immediately prior to  Metamizole dosing.   LACTATE - Normal    Lactate 1.5      Narrative:     Venipuncture immediately after or during the administration of Metamizole may lead to falsely low results. Testing should be performed immediately prior to Metamizole dosing.   URINE CULTURE   BLOOD CULTURE   BLOOD CULTURE   URINALYSIS WITH REFLEX CULTURE AND MICROSCOPIC    Narrative:     The following orders were created for panel order Urinalysis with Reflex Culture and Microscopic.  Procedure                               Abnormality         Status                     ---------                               -----------         ------                     Urinalysis with Reflex C...[[id number]]  Abnormal            Final result               Extra Urine Gray Tube[[id number]]                                                         Please view results for these tests on the individual orders.   EXTRA URINE GRAY TUBE         CT abdomen pelvis wo IV contrast   Final Result   Obstructing stone in the distal right ureter as described. This   results in right hydroureteronephrosis, mild right perinephric edema,   and mild distal right periureteral edema. No other calcified stone on   the right.        Nonobstructing 3 mm stone in the left ureteropelvic junction. No   other left-sided calcified stone. No left-sided hydronephrosis.        Arthritic changes in the spine as described.        Superior endplate compression fracture at L1. This appears to be   remote.        MACRO:   None        Signed by: Prasad Burns 12/4/2024 3:56 PM   Dictation workstation:   [id number]                    No data recorded                Procedure  Procedures    ED Course & MDM   Assessment/Plan:   Sofya Dean is a 64 y.o. female with a history of recurrent UTI, epilepsy, presents with right flank pain and UTI symptoms.  She is asymptomatic currently but states she has been having severe intermittent pains in the right flank for a few days followed by UTI symptoms  today.  Differential includes UTI, pyelonephritis, nephrolithiasis, muscle strain.  She is hemodynamically stable and well-appearing with no CVAT or abdominal tenderness on exam today.  Workup was initiated with labs including urinalysis and CTAP.  She is declining pain medicine at this time. See below for details of ED course and ultimate disposition.    Medications   sodium chloride 0.9% infusion (125 mL/hr intravenous New Bag 12/4/24 1744)   tamsulosin (Flomax) 24 hr capsule 0.4 mg (0.4 mg oral Given 12/4/24 1744)   ciprofloxacin (Cipro) 400 mg in dextrose 5%  mL (0 mg intravenous Stopped 12/4/24 1719)        ED Course as of 12/04/24 1746   Wed Dec 04, 2024   1448 Labs are notable for CBC with mild leukocytosis 11.6, normal H&H, normal platelets, CMP with mildly elevated calcium 11.5, normal LFTs and renal function, urinalysis concerning for UTI with leukocyte esterase, 21-50 WBCs, magnesium and lipase are normal [CG]   1448 Patient was given IV ciprofloxacin empirically for pyelonephritis. [CG]   1635 CTAP There is an obstructing stone in the distal right ureter just above the right ureterovesical junction. This stone measures 6 x 2 x 4 mm. It is evident on axial image 127 and on coronal image 64. It results in upstream mild-to-moderate right hydronephrosis and hydroureter with perinephric edema and mild periureteral edema distally. No other right ureteral stone or right renal stone.  Nonobstructing 3 mm stone in the left ureteropelvic junction. No other left-sided calcified stone. No left-sided hydronephrosis. [CG]   1745 Urology consulted. Patient admitted under Dr. Marroquin for further management (hospitalist) [CG]      ED Course User Index  [CG] Ping Blood MD         Diagnoses as of 12/04/24 1746   Urinary tract obstruction by kidney stone   Pyelonephritis       Disposition:   Admitted to Hospitalist, discussed differential and findings with patient as well as any family members at bedside.       ED Prescriptions    None         Ping Blood MD  EM/IM/Peds    This note was dictated by speech recognition. Minor errors in transcription may be present.     Ping Blood MD  12/04/24 3950

## 2024-12-04 NOTE — CONSULTS
"Reason For Consult  Obstructing stone with pyelo    History Of Present Illness  Sofya Dean is a 64 y.o. female presenting with lower back/flank pain that started Monday evening. She reports pain has been progressively worsening. She developed some nausea and vomiting yesterday. She reports this morning having increased frequency with urination and minor burning. Denies any blood, or difficulty starting a stream.     In ED WBC 11.1 Cr WNL CT scan showing \"Obstructing stone in the distal right ureter as described. This results in right hydroureteronephrosis, mild right perinephric edema, and mild distal right periureteral edema. No other calcified stone on the right. Nonobstructing 3 mm stone in the left ureteropelvic junction. No other left-sided calcified stone. No left-sided hydronephrosis.\" For this urology was consulted.     Past Medical History  She has a past medical history of Acute vaginitis (07/23/2019), Asymptomatic menopausal state (10/29/2021), Disorder of the skin and subcutaneous tissue, unspecified (12/28/2016), Encounter for general adult medical examination without abnormal findings (12/28/2016), Encounter for screening for other viral diseases (02/19/2018), Immunization not carried out because of patient refusal (12/28/2016), Low back pain, unspecified (01/03/2019), Other specified joint disorders, unspecified shoulder (09/30/2015), Pain in right shoulder (09/30/2015), Personal history of other specified conditions (01/10/2017), and Wedge compression fracture of first lumbar vertebra, initial encounter for closed fracture (Multi) (09/05/2018).    Surgical History  She has a past surgical history that includes Other surgical history (10/01/2015) and Colonoscopy w/ polypectomy (12/03/2016).     Social History  She reports that she has never smoked. She has never used smokeless tobacco. She reports current alcohol use. She reports that she does not use drugs.    Family History  No family history " "on file.     Allergies  Cefadroxil, Chlorpheniramine-pseudoephed, and Iodine    Review of Systems  A full 10 point ROS was completed. Nothing positive other than what was mentioned in the HPI.      Physical Exam  PE:  Constitutional: A&Ox3, calm and cooperative  Head/Neck: Neck supple, no JVD  Cardiovascular: RRR  Respiratory/Thorax: Non labored breathing on RA  Genitourinary: Voiding independently   Neurological: No focal deficits   Psychological: Appropriate mood and behavior  Skin: Warm and dry.       Last Recorded Vitals  Blood pressure 132/71, pulse 76, temperature 36.9 °C (98.5 °F), temperature source Temporal, resp. rate 18, height 1.702 m (5' 7\"), weight 90.7 kg (200 lb), SpO2 94%.    Relevant Results  Results for orders placed or performed during the hospital encounter of 12/04/24 (from the past 24 hours)   CBC and Auto Differential   Result Value Ref Range    WBC 11.6 (H) 4.4 - 11.3 x10*3/uL    nRBC 0.0 0.0 - 0.0 /100 WBCs    RBC 4.53 4.00 - 5.20 x10*6/uL    Hemoglobin 13.5 12.0 - 16.0 g/dL    Hematocrit 42.2 36.0 - 46.0 %    MCV 93 80 - 100 fL    MCH 29.8 26.0 - 34.0 pg    MCHC 32.0 32.0 - 36.0 g/dL    RDW 13.8 11.5 - 14.5 %    Platelets 226 150 - 450 x10*3/uL    Neutrophils % 68.1 40.0 - 80.0 %    Immature Granulocytes %, Automated 0.5 0.0 - 0.9 %    Lymphocytes % 22.8 13.0 - 44.0 %    Monocytes % 6.6 2.0 - 10.0 %    Eosinophils % 1.7 0.0 - 6.0 %    Basophils % 0.3 0.0 - 2.0 %    Neutrophils Absolute 7.87 (H) 1.20 - 7.70 x10*3/uL    Immature Granulocytes Absolute, Automated 0.06 0.00 - 0.70 x10*3/uL    Lymphocytes Absolute 2.64 1.20 - 4.80 x10*3/uL    Monocytes Absolute 0.76 0.10 - 1.00 x10*3/uL    Eosinophils Absolute 0.20 0.00 - 0.70 x10*3/uL    Basophils Absolute 0.03 0.00 - 0.10 x10*3/uL   Comprehensive metabolic panel   Result Value Ref Range    Glucose 111 (H) 74 - 99 mg/dL    Sodium 142 136 - 145 mmol/L    Potassium 4.1 3.5 - 5.3 mmol/L    Chloride 105 98 - 107 mmol/L    Bicarbonate 29 21 - 32 " mmol/L    Anion Gap 12 10 - 20 mmol/L    Urea Nitrogen 25 (H) 6 - 23 mg/dL    Creatinine 0.78 0.50 - 1.05 mg/dL    eGFR 84 >60 mL/min/1.73m*2    Calcium 11.5 (H) 8.6 - 10.3 mg/dL    Albumin 4.5 3.4 - 5.0 g/dL    Alkaline Phosphatase 49 33 - 136 U/L    Total Protein 7.2 6.4 - 8.2 g/dL    AST 14 9 - 39 U/L    Bilirubin, Total 0.4 0.0 - 1.2 mg/dL    ALT 15 7 - 45 U/L   Magnesium   Result Value Ref Range    Magnesium 1.72 1.60 - 2.40 mg/dL   Lipase   Result Value Ref Range    Lipase 26 9 - 82 U/L   Lactate   Result Value Ref Range    Lactate 1.5 0.4 - 2.0 mmol/L   Urinalysis with Reflex Culture and Microscopic   Result Value Ref Range    Color, Urine Yellow Light-Yellow, Yellow, Dark-Yellow    Appearance, Urine Turbid (N) Clear    Specific Gravity, Urine 1.023 1.005 - 1.035    pH, Urine 5.5 5.0, 5.5, 6.0, 6.5, 7.0, 7.5, 8.0    Protein, Urine 20 (TRACE) NEGATIVE, 10 (TRACE), 20 (TRACE) mg/dL    Glucose, Urine Normal Normal mg/dL    Blood, Urine 1.0 (3+) (A) NEGATIVE    Ketones, Urine 10 (1+) (A) NEGATIVE mg/dL    Bilirubin, Urine NEGATIVE NEGATIVE    Urobilinogen, Urine Normal Normal mg/dL    Nitrite, Urine NEGATIVE NEGATIVE    Leukocyte Esterase, Urine 25 Tawnya/µL (A) NEGATIVE   Microscopic Only, Urine   Result Value Ref Range    WBC, Urine 21-50 (A) 1-5, NONE /HPF    RBC, Urine >20 (A) NONE, 1-2, 3-5 /HPF    Squamous Epithelial Cells, Urine 1-9 (SPARSE) Reference range not established. /HPF    Budding Yeast, Urine PRESENT (A) NONE /HPF    Mucus, Urine FEW Reference range not established. /LPF     CT abdomen pelvis wo IV contrast   Final Result   Obstructing stone in the distal right ureter as described. This   results in right hydroureteronephrosis, mild right perinephric edema,   and mild distal right periureteral edema. No other calcified stone on   the right.        Nonobstructing 3 mm stone in the left ureteropelvic junction. No   other left-sided calcified stone. No left-sided hydronephrosis.        Arthritic  changes in the spine as described.        Superior endplate compression fracture at L1. This appears to be   remote.        MACRO:   None        Signed by: Prasad Burns 12/4/2024 3:56 PM   Dictation workstation:   [id number]            Assessment/Plan     Plan: Obstructing R 6mm ureteral stone  - Admit to medicine  - Regular diet tonight  - NPO MN  - Continue IVF @125  - Strain urine  - PRN pain control  - PRN antiemetic  - Repeat labs in AM  - Tentatively added to OR tomorrow for R stent with Dr. Lopez     Discussed with Dr. Lopez. Tentative plan for OR tomorrow if unable to pass stone.    I spent 60 minutes in the professional and overall care of this patient.      Stan Gonzáles PA-C

## 2024-12-04 NOTE — H&P (VIEW-ONLY)
"Reason For Consult  Obstructing stone with pyelo    History Of Present Illness  Sofya Dean is a 64 y.o. female presenting with lower back/flank pain that started Monday evening. She reports pain has been progressively worsening. She developed some nausea and vomiting yesterday. She reports this morning having increased frequency with urination and minor burning. Denies any blood, or difficulty starting a stream.     In ED WBC 11.1 Cr WNL CT scan showing \"Obstructing stone in the distal right ureter as described. This results in right hydroureteronephrosis, mild right perinephric edema, and mild distal right periureteral edema. No other calcified stone on the right. Nonobstructing 3 mm stone in the left ureteropelvic junction. No other left-sided calcified stone. No left-sided hydronephrosis.\" For this urology was consulted.     Past Medical History  She has a past medical history of Acute vaginitis (07/23/2019), Asymptomatic menopausal state (10/29/2021), Disorder of the skin and subcutaneous tissue, unspecified (12/28/2016), Encounter for general adult medical examination without abnormal findings (12/28/2016), Encounter for screening for other viral diseases (02/19/2018), Immunization not carried out because of patient refusal (12/28/2016), Low back pain, unspecified (01/03/2019), Other specified joint disorders, unspecified shoulder (09/30/2015), Pain in right shoulder (09/30/2015), Personal history of other specified conditions (01/10/2017), and Wedge compression fracture of first lumbar vertebra, initial encounter for closed fracture (Multi) (09/05/2018).    Surgical History  She has a past surgical history that includes Other surgical history (10/01/2015) and Colonoscopy w/ polypectomy (12/03/2016).     Social History  She reports that she has never smoked. She has never used smokeless tobacco. She reports current alcohol use. She reports that she does not use drugs.    Family History  No family history " "on file.     Allergies  Cefadroxil, Chlorpheniramine-pseudoephed, and Iodine    Review of Systems  A full 10 point ROS was completed. Nothing positive other than what was mentioned in the HPI.      Physical Exam  PE:  Constitutional: A&Ox3, calm and cooperative  Head/Neck: Neck supple, no JVD  Cardiovascular: RRR  Respiratory/Thorax: Non labored breathing on RA  Genitourinary: Voiding independently   Neurological: No focal deficits   Psychological: Appropriate mood and behavior  Skin: Warm and dry.       Last Recorded Vitals  Blood pressure 132/71, pulse 76, temperature 36.9 °C (98.5 °F), temperature source Temporal, resp. rate 18, height 1.702 m (5' 7\"), weight 90.7 kg (200 lb), SpO2 94%.    Relevant Results  Results for orders placed or performed during the hospital encounter of 12/04/24 (from the past 24 hours)   CBC and Auto Differential   Result Value Ref Range    WBC 11.6 (H) 4.4 - 11.3 x10*3/uL    nRBC 0.0 0.0 - 0.0 /100 WBCs    RBC 4.53 4.00 - 5.20 x10*6/uL    Hemoglobin 13.5 12.0 - 16.0 g/dL    Hematocrit 42.2 36.0 - 46.0 %    MCV 93 80 - 100 fL    MCH 29.8 26.0 - 34.0 pg    MCHC 32.0 32.0 - 36.0 g/dL    RDW 13.8 11.5 - 14.5 %    Platelets 226 150 - 450 x10*3/uL    Neutrophils % 68.1 40.0 - 80.0 %    Immature Granulocytes %, Automated 0.5 0.0 - 0.9 %    Lymphocytes % 22.8 13.0 - 44.0 %    Monocytes % 6.6 2.0 - 10.0 %    Eosinophils % 1.7 0.0 - 6.0 %    Basophils % 0.3 0.0 - 2.0 %    Neutrophils Absolute 7.87 (H) 1.20 - 7.70 x10*3/uL    Immature Granulocytes Absolute, Automated 0.06 0.00 - 0.70 x10*3/uL    Lymphocytes Absolute 2.64 1.20 - 4.80 x10*3/uL    Monocytes Absolute 0.76 0.10 - 1.00 x10*3/uL    Eosinophils Absolute 0.20 0.00 - 0.70 x10*3/uL    Basophils Absolute 0.03 0.00 - 0.10 x10*3/uL   Comprehensive metabolic panel   Result Value Ref Range    Glucose 111 (H) 74 - 99 mg/dL    Sodium 142 136 - 145 mmol/L    Potassium 4.1 3.5 - 5.3 mmol/L    Chloride 105 98 - 107 mmol/L    Bicarbonate 29 21 - 32 " mmol/L    Anion Gap 12 10 - 20 mmol/L    Urea Nitrogen 25 (H) 6 - 23 mg/dL    Creatinine 0.78 0.50 - 1.05 mg/dL    eGFR 84 >60 mL/min/1.73m*2    Calcium 11.5 (H) 8.6 - 10.3 mg/dL    Albumin 4.5 3.4 - 5.0 g/dL    Alkaline Phosphatase 49 33 - 136 U/L    Total Protein 7.2 6.4 - 8.2 g/dL    AST 14 9 - 39 U/L    Bilirubin, Total 0.4 0.0 - 1.2 mg/dL    ALT 15 7 - 45 U/L   Magnesium   Result Value Ref Range    Magnesium 1.72 1.60 - 2.40 mg/dL   Lipase   Result Value Ref Range    Lipase 26 9 - 82 U/L   Lactate   Result Value Ref Range    Lactate 1.5 0.4 - 2.0 mmol/L   Urinalysis with Reflex Culture and Microscopic   Result Value Ref Range    Color, Urine Yellow Light-Yellow, Yellow, Dark-Yellow    Appearance, Urine Turbid (N) Clear    Specific Gravity, Urine 1.023 1.005 - 1.035    pH, Urine 5.5 5.0, 5.5, 6.0, 6.5, 7.0, 7.5, 8.0    Protein, Urine 20 (TRACE) NEGATIVE, 10 (TRACE), 20 (TRACE) mg/dL    Glucose, Urine Normal Normal mg/dL    Blood, Urine 1.0 (3+) (A) NEGATIVE    Ketones, Urine 10 (1+) (A) NEGATIVE mg/dL    Bilirubin, Urine NEGATIVE NEGATIVE    Urobilinogen, Urine Normal Normal mg/dL    Nitrite, Urine NEGATIVE NEGATIVE    Leukocyte Esterase, Urine 25 Tawnya/µL (A) NEGATIVE   Microscopic Only, Urine   Result Value Ref Range    WBC, Urine 21-50 (A) 1-5, NONE /HPF    RBC, Urine >20 (A) NONE, 1-2, 3-5 /HPF    Squamous Epithelial Cells, Urine 1-9 (SPARSE) Reference range not established. /HPF    Budding Yeast, Urine PRESENT (A) NONE /HPF    Mucus, Urine FEW Reference range not established. /LPF     CT abdomen pelvis wo IV contrast   Final Result   Obstructing stone in the distal right ureter as described. This   results in right hydroureteronephrosis, mild right perinephric edema,   and mild distal right periureteral edema. No other calcified stone on   the right.        Nonobstructing 3 mm stone in the left ureteropelvic junction. No   other left-sided calcified stone. No left-sided hydronephrosis.        Arthritic  changes in the spine as described.        Superior endplate compression fracture at L1. This appears to be   remote.        MACRO:   None        Signed by: Prasad Burns 12/4/2024 3:56 PM   Dictation workstation:   [id number]            Assessment/Plan     Plan: Obstructing R 6mm ureteral stone  - Admit to medicine  - Regular diet tonight  - NPO MN  - Continue IVF @125  - Strain urine  - PRN pain control  - PRN antiemetic  - Repeat labs in AM  - Tentatively added to OR tomorrow for R stent with Dr. Lopez     Discussed with Dr. Lopez. Tentative plan for OR tomorrow if unable to pass stone.    I spent 60 minutes in the professional and overall care of this patient.      Stan Gonzáles PA-C

## 2024-12-05 ENCOUNTER — APPOINTMENT (OUTPATIENT)
Dept: RADIOLOGY | Facility: HOSPITAL | Age: 64
End: 2024-12-05
Payer: MEDICARE

## 2024-12-05 ENCOUNTER — ANESTHESIA (OUTPATIENT)
Dept: OPERATING ROOM | Facility: HOSPITAL | Age: 64
End: 2024-12-05
Payer: MEDICARE

## 2024-12-05 ENCOUNTER — ANESTHESIA EVENT (OUTPATIENT)
Dept: OPERATING ROOM | Facility: HOSPITAL | Age: 64
End: 2024-12-05
Payer: MEDICARE

## 2024-12-05 ENCOUNTER — APPOINTMENT (OUTPATIENT)
Dept: CARDIOLOGY | Facility: HOSPITAL | Age: 64
End: 2024-12-05
Payer: MEDICARE

## 2024-12-05 LAB
ANION GAP SERPL CALC-SCNC: 9 MMOL/L (ref 10–20)
BACTERIA UR CULT: NO GROWTH
BASOPHILS # BLD AUTO: 0.01 X10*3/UL (ref 0–0.1)
BASOPHILS NFR BLD AUTO: 0.1 %
BUN SERPL-MCNC: 23 MG/DL (ref 6–23)
CALCIUM SERPL-MCNC: 9 MG/DL (ref 8.6–10.3)
CHLORIDE SERPL-SCNC: 112 MMOL/L (ref 98–107)
CO2 SERPL-SCNC: 26 MMOL/L (ref 21–32)
CREAT SERPL-MCNC: 0.66 MG/DL (ref 0.5–1.05)
EGFRCR SERPLBLD CKD-EPI 2021: >90 ML/MIN/1.73M*2
EOSINOPHIL # BLD AUTO: 0.24 X10*3/UL (ref 0–0.7)
EOSINOPHIL NFR BLD AUTO: 3.6 %
ERYTHROCYTE [DISTWIDTH] IN BLOOD BY AUTOMATED COUNT: 14 % (ref 11.5–14.5)
GLUCOSE SERPL-MCNC: 105 MG/DL (ref 74–99)
HCT VFR BLD AUTO: 34.6 % (ref 36–46)
HGB BLD-MCNC: 11.2 G/DL (ref 12–16)
IMM GRANULOCYTES # BLD AUTO: 0.03 X10*3/UL (ref 0–0.7)
IMM GRANULOCYTES NFR BLD AUTO: 0.4 % (ref 0–0.9)
LYMPHOCYTES # BLD AUTO: 3.11 X10*3/UL (ref 1.2–4.8)
LYMPHOCYTES NFR BLD AUTO: 46.3 %
MCH RBC QN AUTO: 29.9 PG (ref 26–34)
MCHC RBC AUTO-ENTMCNC: 32.4 G/DL (ref 32–36)
MCV RBC AUTO: 93 FL (ref 80–100)
MONOCYTES # BLD AUTO: 0.4 X10*3/UL (ref 0.1–1)
MONOCYTES NFR BLD AUTO: 6 %
NEUTROPHILS # BLD AUTO: 2.93 X10*3/UL (ref 1.2–7.7)
NEUTROPHILS NFR BLD AUTO: 43.6 %
NRBC BLD-RTO: 0 /100 WBCS (ref 0–0)
PLATELET # BLD AUTO: 184 X10*3/UL (ref 150–450)
POTASSIUM SERPL-SCNC: 3.8 MMOL/L (ref 3.5–5.3)
RBC # BLD AUTO: 3.74 X10*6/UL (ref 4–5.2)
SODIUM SERPL-SCNC: 143 MMOL/L (ref 136–145)
WBC # BLD AUTO: 6.7 X10*3/UL (ref 4.4–11.3)

## 2024-12-05 PROCEDURE — 2500000004 HC RX 250 GENERAL PHARMACY W/ HCPCS (ALT 636 FOR OP/ED): Performed by: STUDENT IN AN ORGANIZED HEALTH CARE EDUCATION/TRAINING PROGRAM

## 2024-12-05 PROCEDURE — 3600000008 HC OR TIME - EACH INCREMENTAL 1 MINUTE - PROCEDURE LEVEL THREE: Performed by: UROLOGY

## 2024-12-05 PROCEDURE — 2500000001 HC RX 250 WO HCPCS SELF ADMINISTERED DRUGS (ALT 637 FOR MEDICARE OP): Performed by: STUDENT IN AN ORGANIZED HEALTH CARE EDUCATION/TRAINING PROGRAM

## 2024-12-05 PROCEDURE — 0T768DZ DILATION OF RIGHT URETER WITH INTRALUMINAL DEVICE, VIA NATURAL OR ARTIFICIAL OPENING ENDOSCOPIC: ICD-10-PCS | Performed by: UROLOGY

## 2024-12-05 PROCEDURE — 36415 COLL VENOUS BLD VENIPUNCTURE: CPT | Performed by: NURSE PRACTITIONER

## 2024-12-05 PROCEDURE — 76000 FLUOROSCOPY <1 HR PHYS/QHP: CPT | Mod: RT

## 2024-12-05 PROCEDURE — 2720000007 HC OR 272 NO HCPCS: Performed by: UROLOGY

## 2024-12-05 PROCEDURE — 3700000001 HC GENERAL ANESTHESIA TIME - INITIAL BASE CHARGE: Performed by: UROLOGY

## 2024-12-05 PROCEDURE — C1769 GUIDE WIRE: HCPCS | Performed by: UROLOGY

## 2024-12-05 PROCEDURE — 80051 ELECTROLYTE PANEL: CPT | Performed by: NURSE PRACTITIONER

## 2024-12-05 PROCEDURE — BT1D1ZZ FLUOROSCOPY OF RIGHT KIDNEY, URETER AND BLADDER USING LOW OSMOLAR CONTRAST: ICD-10-PCS | Performed by: UROLOGY

## 2024-12-05 PROCEDURE — 3600000003 HC OR TIME - INITIAL BASE CHARGE - PROCEDURE LEVEL THREE: Performed by: UROLOGY

## 2024-12-05 PROCEDURE — 2550000001 HC RX 255 CONTRASTS: Performed by: UROLOGY

## 2024-12-05 PROCEDURE — 85025 COMPLETE CBC W/AUTO DIFF WBC: CPT | Performed by: NURSE PRACTITIONER

## 2024-12-05 PROCEDURE — 99233 SBSQ HOSP IP/OBS HIGH 50: CPT | Performed by: INTERNAL MEDICINE

## 2024-12-05 PROCEDURE — 99222 1ST HOSP IP/OBS MODERATE 55: CPT | Performed by: UROLOGY

## 2024-12-05 PROCEDURE — 76770 US EXAM ABDO BACK WALL COMP: CPT

## 2024-12-05 PROCEDURE — 2500000002 HC RX 250 W HCPCS SELF ADMINISTERED DRUGS (ALT 637 FOR MEDICARE OP, ALT 636 FOR OP/ED): Performed by: NURSE PRACTITIONER

## 2024-12-05 PROCEDURE — 74018 RADEX ABDOMEN 1 VIEW: CPT

## 2024-12-05 PROCEDURE — 3700000002 HC GENERAL ANESTHESIA TIME - EACH INCREMENTAL 1 MINUTE: Performed by: UROLOGY

## 2024-12-05 PROCEDURE — C1758 CATHETER, URETERAL: HCPCS | Performed by: UROLOGY

## 2024-12-05 PROCEDURE — 2500000004 HC RX 250 GENERAL PHARMACY W/ HCPCS (ALT 636 FOR OP/ED): Performed by: NURSE ANESTHETIST, CERTIFIED REGISTERED

## 2024-12-05 PROCEDURE — 74018 RADEX ABDOMEN 1 VIEW: CPT | Performed by: RADIOLOGY

## 2024-12-05 PROCEDURE — 93005 ELECTROCARDIOGRAM TRACING: CPT

## 2024-12-05 PROCEDURE — 1100000001 HC PRIVATE ROOM DAILY

## 2024-12-05 PROCEDURE — 7100000002 HC RECOVERY ROOM TIME - EACH INCREMENTAL 1 MINUTE: Performed by: UROLOGY

## 2024-12-05 PROCEDURE — 2500000001 HC RX 250 WO HCPCS SELF ADMINISTERED DRUGS (ALT 637 FOR MEDICARE OP): Performed by: HOSPITALIST

## 2024-12-05 PROCEDURE — 2500000004 HC RX 250 GENERAL PHARMACY W/ HCPCS (ALT 636 FOR OP/ED): Performed by: NURSE PRACTITIONER

## 2024-12-05 PROCEDURE — 82365 CALCULUS SPECTROSCOPY: CPT

## 2024-12-05 PROCEDURE — 2780000003 HC OR 278 NO HCPCS: Performed by: UROLOGY

## 2024-12-05 PROCEDURE — C2617 STENT, NON-COR, TEM W/O DEL: HCPCS | Performed by: UROLOGY

## 2024-12-05 PROCEDURE — 7100000001 HC RECOVERY ROOM TIME - INITIAL BASE CHARGE: Performed by: UROLOGY

## 2024-12-05 PROCEDURE — 74420 UROGRAPHY RTRGR +-KUB: CPT | Performed by: UROLOGY

## 2024-12-05 PROCEDURE — 76770 US EXAM ABDO BACK WALL COMP: CPT | Performed by: RADIOLOGY

## 2024-12-05 PROCEDURE — 2500000001 HC RX 250 WO HCPCS SELF ADMINISTERED DRUGS (ALT 637 FOR MEDICARE OP): Performed by: NURSE PRACTITIONER

## 2024-12-05 PROCEDURE — 52356 CYSTO/URETERO W/LITHOTRIPSY: CPT | Performed by: UROLOGY

## 2024-12-05 PROCEDURE — 0TC68ZZ EXTIRPATION OF MATTER FROM RIGHT URETER, VIA NATURAL OR ARTIFICIAL OPENING ENDOSCOPIC: ICD-10-PCS | Performed by: UROLOGY

## 2024-12-05 DEVICE — URETERAL STENT
Type: IMPLANTABLE DEVICE | Site: URETER | Status: FUNCTIONAL
Brand: POLARIS™ ULTRA

## 2024-12-05 RX ORDER — MIDAZOLAM HYDROCHLORIDE 1 MG/ML
INJECTION INTRAMUSCULAR; INTRAVENOUS AS NEEDED
Status: DISCONTINUED | OUTPATIENT
Start: 2024-12-05 | End: 2024-12-05

## 2024-12-05 RX ORDER — ONDANSETRON HYDROCHLORIDE 2 MG/ML
INJECTION, SOLUTION INTRAVENOUS AS NEEDED
Status: DISCONTINUED | OUTPATIENT
Start: 2024-12-05 | End: 2024-12-05

## 2024-12-05 RX ORDER — ALBUTEROL SULFATE 0.83 MG/ML
2.5 SOLUTION RESPIRATORY (INHALATION) ONCE AS NEEDED
Status: DISCONTINUED | OUTPATIENT
Start: 2024-12-05 | End: 2024-12-05 | Stop reason: HOSPADM

## 2024-12-05 RX ORDER — PROPOFOL 10 MG/ML
INJECTION, EMULSION INTRAVENOUS AS NEEDED
Status: DISCONTINUED | OUTPATIENT
Start: 2024-12-05 | End: 2024-12-05

## 2024-12-05 RX ORDER — FUROSEMIDE 10 MG/ML
INJECTION INTRAMUSCULAR; INTRAVENOUS AS NEEDED
Status: DISCONTINUED | OUTPATIENT
Start: 2024-12-05 | End: 2024-12-05

## 2024-12-05 RX ORDER — ONDANSETRON HYDROCHLORIDE 2 MG/ML
4 INJECTION, SOLUTION INTRAVENOUS ONCE AS NEEDED
Status: DISCONTINUED | OUTPATIENT
Start: 2024-12-05 | End: 2024-12-05 | Stop reason: HOSPADM

## 2024-12-05 RX ORDER — OXYCODONE HYDROCHLORIDE 5 MG/1
5 TABLET ORAL EVERY 4 HOURS PRN
Status: DISCONTINUED | OUTPATIENT
Start: 2024-12-05 | End: 2024-12-05 | Stop reason: HOSPADM

## 2024-12-05 RX ORDER — GENTAMICIN 40 MG/ML
INJECTION, SOLUTION INTRAMUSCULAR; INTRAVENOUS AS NEEDED
Status: DISCONTINUED | OUTPATIENT
Start: 2024-12-05 | End: 2024-12-05

## 2024-12-05 RX ORDER — PHENAZOPYRIDINE HYDROCHLORIDE 100 MG/1
95 TABLET, FILM COATED ORAL
Status: DISCONTINUED | OUTPATIENT
Start: 2024-12-06 | End: 2024-12-06 | Stop reason: HOSPADM

## 2024-12-05 RX ORDER — LIDOCAINE HYDROCHLORIDE 20 MG/ML
INJECTION, SOLUTION INFILTRATION; PERINEURAL AS NEEDED
Status: DISCONTINUED | OUTPATIENT
Start: 2024-12-05 | End: 2024-12-05

## 2024-12-05 RX ORDER — ACETAMINOPHEN 325 MG/1
650 TABLET ORAL EVERY 4 HOURS PRN
Status: DISCONTINUED | OUTPATIENT
Start: 2024-12-05 | End: 2024-12-05 | Stop reason: HOSPADM

## 2024-12-05 RX ORDER — FENTANYL CITRATE 50 UG/ML
INJECTION, SOLUTION INTRAMUSCULAR; INTRAVENOUS AS NEEDED
Status: DISCONTINUED | OUTPATIENT
Start: 2024-12-05 | End: 2024-12-05

## 2024-12-05 RX ORDER — PHENAZOPYRIDINE HYDROCHLORIDE 100 MG/1
95 TABLET, FILM COATED ORAL ONCE
Status: COMPLETED | OUTPATIENT
Start: 2024-12-05 | End: 2024-12-05

## 2024-12-05 SDOH — SOCIAL STABILITY: SOCIAL INSECURITY: ARE YOU MARRIED, WIDOWED, DIVORCED, SEPARATED, NEVER MARRIED, OR LIVING WITH A PARTNER?: MARRIED

## 2024-12-05 SDOH — HEALTH STABILITY: PHYSICAL HEALTH: ON AVERAGE, HOW MANY DAYS PER WEEK DO YOU ENGAGE IN MODERATE TO STRENUOUS EXERCISE (LIKE A BRISK WALK)?: 2 DAYS

## 2024-12-05 SDOH — SOCIAL STABILITY: SOCIAL NETWORK: HOW OFTEN DO YOU ATTEND MEETINGS OF THE CLUBS OR ORGANIZATIONS YOU BELONG TO?: NEVER

## 2024-12-05 SDOH — SOCIAL STABILITY: SOCIAL NETWORK
IN A TYPICAL WEEK, HOW MANY TIMES DO YOU TALK ON THE PHONE WITH FAMILY, FRIENDS, OR NEIGHBORS?: MORE THAN THREE TIMES A WEEK

## 2024-12-05 SDOH — SOCIAL STABILITY: SOCIAL NETWORK: HOW OFTEN DO YOU GET TOGETHER WITH FRIENDS OR RELATIVES?: THREE TIMES A WEEK

## 2024-12-05 SDOH — HEALTH STABILITY: PHYSICAL HEALTH: ON AVERAGE, HOW MANY MINUTES DO YOU ENGAGE IN EXERCISE AT THIS LEVEL?: 50 MIN

## 2024-12-05 SDOH — SOCIAL STABILITY: SOCIAL NETWORK
DO YOU BELONG TO ANY CLUBS OR ORGANIZATIONS SUCH AS CHURCH GROUPS, UNIONS, FRATERNAL OR ATHLETIC GROUPS, OR SCHOOL GROUPS?: NO

## 2024-12-05 SDOH — HEALTH STABILITY: PHYSICAL HEALTH
HOW OFTEN DO YOU NEED TO HAVE SOMEONE HELP YOU WHEN YOU READ INSTRUCTIONS, PAMPHLETS, OR OTHER WRITTEN MATERIAL FROM YOUR DOCTOR OR PHARMACY?: NEVER

## 2024-12-05 SDOH — SOCIAL STABILITY: SOCIAL NETWORK: HOW OFTEN DO YOU ATTEND CHURCH OR RELIGIOUS SERVICES?: NEVER

## 2024-12-05 ASSESSMENT — COGNITIVE AND FUNCTIONAL STATUS - GENERAL
DAILY ACTIVITIY SCORE: 24
MOBILITY SCORE: 24

## 2024-12-05 ASSESSMENT — PAIN - FUNCTIONAL ASSESSMENT
PAIN_FUNCTIONAL_ASSESSMENT: 0-10

## 2024-12-05 ASSESSMENT — COLUMBIA-SUICIDE SEVERITY RATING SCALE - C-SSRS
1. IN THE PAST MONTH, HAVE YOU WISHED YOU WERE DEAD OR WISHED YOU COULD GO TO SLEEP AND NOT WAKE UP?: NO
2. HAVE YOU ACTUALLY HAD ANY THOUGHTS OF KILLING YOURSELF?: NO
6. HAVE YOU EVER DONE ANYTHING, STARTED TO DO ANYTHING, OR PREPARED TO DO ANYTHING TO END YOUR LIFE?: NO

## 2024-12-05 ASSESSMENT — PAIN SCALES - GENERAL
PAINLEVEL_OUTOF10: 0 - NO PAIN
PAINLEVEL_OUTOF10: 3
PAINLEVEL_OUTOF10: 0 - NO PAIN

## 2024-12-05 ASSESSMENT — ENCOUNTER SYMPTOMS
NEUROLOGICAL NEGATIVE: 1
NAUSEA: 1
FLANK PAIN: 1
RESPIRATORY NEGATIVE: 1
CARDIOVASCULAR NEGATIVE: 1
CONSTITUTIONAL NEGATIVE: 1
PSYCHIATRIC NEGATIVE: 1
DYSURIA: 1

## 2024-12-05 NOTE — PROGRESS NOTES
Pharmacy Medication History     Additional concerns with the patient's PTA list.   Confirmed medications with patient    The following updates were made to the Prior to Admission medication list:     Source of Information:     Medications ADDED:   N/a  Medications CHANGED:  N/a  Medications REMOVED:   N/a  Medications NOT TAKING:   N/a    Allergy reviewed : Yes    Meds 2 Beds : No    Comments:      The list below reflectives the updated PTA list. Please review each medication in order reconciliation for additional clarification and justification.    Prior to Admission Medications   Prescriptions Last Dose Informant   calcium carbonate 600 mg calcium (1,500 mg) tablet Unknown Self   Sig: Take 1 tablet (1,500 mg) by mouth once daily.   divalproex (Depakote ER) 250 mg 24 hr tablet 12/4/2024 Self   Sig: Take 2 tablets (500 mg) by mouth once daily in the morning AND 1 tablet (250 mg) once daily in the evening. Do not crush, chew, or split..   fish oil concentrate (Omega-3) 120-180 mg capsule Unknown Self   Sig: Take 1 capsule (1 g) by mouth once daily.   multivitamin tablet Unknown Self   Sig: Take 1 tablet by mouth once daily.      Facility-Administered Medications: None       The list below reflectives the updated allergy list. Please review each documented allergy for additional clarification and justification.    Allergies   Allergen Reactions    Cefadroxil Other    Chlorpheniramine-Pseudoephed Hives    Iodine Hives and Swelling          12/04/24 at 7:02 PM - Prosper Edward

## 2024-12-05 NOTE — PROGRESS NOTES
12/05/24 1432   Discharge Planning   Assistance Needed CPAP   Do you have animals or pets at home? Yes   Type of Animals or Pets 2 dogs   Who is requesting discharge planning? Provider   Does the patient need discharge transport arranged? No   Patient Choice   Patient / Family choosing to utilize agency / facility established prior to hospitalization No   Stroke Family Assessment   Stroke Family Assessment Needed No   Intensity of Service   Intensity of Service 0-30 min     Called patient and introduced myself and my role.  Urological stent placed today for 3mm stone.  Patient was independent with all care prior to this hospitalization. No home going needs.

## 2024-12-05 NOTE — PROGRESS NOTES
Sofya Dean is a 64 y.o. female on day 1 of admission presenting with Nephrolithiasis.      Subjective   Pt seen and examined.        Objective     Last Recorded Vitals  /82 (Patient Position: Lying)   Pulse 81   Temp 36.6 °C (97.9 °F) (Temporal)   Resp 18   Wt 90.7 kg (200 lb)   SpO2 95%   Intake/Output last 3 Shifts:    Intake/Output Summary (Last 24 hours) at 12/5/2024 1309  Last data filed at 12/5/2024 1011  Gross per 24 hour   Intake 2056.25 ml   Output 175 ml   Net 1881.25 ml       Admission Weight  Weight: 90.7 kg (200 lb) (12/04/24 1146)    Daily Weight  12/04/24 : 90.7 kg (200 lb)      Physical Exam  HENT:      Head: Normocephalic.      Mouth/Throat:      Mouth: Mucous membranes are moist.   Cardiovascular:      Rate and Rhythm: Normal rate.      Pulses: Normal pulses.   Pulmonary:      Effort: Pulmonary effort is normal.   Abdominal:      General: Bowel sounds are normal.   Genitourinary:     Comments: Right flank pain   Musculoskeletal:         General: Normal range of motion.      Cervical back: Neck supple.   Skin:     General: Skin is warm.   Neurological:      Mental Status: She is alert and oriented to person, place, and time.   Psychiatric:         Mood and Affect: Mood normal.   Relevant Results  Results for orders placed or performed during the hospital encounter of 12/04/24 (from the past 24 hours)   Blood Culture    Specimen: Peripheral Venipuncture; Blood culture   Result Value Ref Range    Blood Culture Loaded on Instrument - Culture in progress    Blood Culture    Specimen: Peripheral Venipuncture; Blood culture   Result Value Ref Range    Blood Culture Loaded on Instrument - Culture in progress    CBC and Auto Differential   Result Value Ref Range    WBC 6.7 4.4 - 11.3 x10*3/uL    nRBC 0.0 0.0 - 0.0 /100 WBCs    RBC 3.74 (L) 4.00 - 5.20 x10*6/uL    Hemoglobin 11.2 (L) 12.0 - 16.0 g/dL    Hematocrit 34.6 (L) 36.0 - 46.0 %    MCV 93 80 - 100 fL    MCH 29.9 26.0 - 34.0 pg    MCHC  32.4 32.0 - 36.0 g/dL    RDW 14.0 11.5 - 14.5 %    Platelets 184 150 - 450 x10*3/uL    Neutrophils % 43.6 40.0 - 80.0 %    Immature Granulocytes %, Automated 0.4 0.0 - 0.9 %    Lymphocytes % 46.3 13.0 - 44.0 %    Monocytes % 6.0 2.0 - 10.0 %    Eosinophils % 3.6 0.0 - 6.0 %    Basophils % 0.1 0.0 - 2.0 %    Neutrophils Absolute 2.93 1.20 - 7.70 x10*3/uL    Immature Granulocytes Absolute, Automated 0.03 0.00 - 0.70 x10*3/uL    Lymphocytes Absolute 3.11 1.20 - 4.80 x10*3/uL    Monocytes Absolute 0.40 0.10 - 1.00 x10*3/uL    Eosinophils Absolute 0.24 0.00 - 0.70 x10*3/uL    Basophils Absolute 0.01 0.00 - 0.10 x10*3/uL   Basic metabolic panel   Result Value Ref Range    Glucose 105 (H) 74 - 99 mg/dL    Sodium 143 136 - 145 mmol/L    Potassium 3.8 3.5 - 5.3 mmol/L    Chloride 112 (H) 98 - 107 mmol/L    Bicarbonate 26 21 - 32 mmol/L    Anion Gap 9 (L) 10 - 20 mmol/L    Urea Nitrogen 23 6 - 23 mg/dL    Creatinine 0.66 0.50 - 1.05 mg/dL    eGFR >90 >60 mL/min/1.73m*2    Calcium 9.0 8.6 - 10.3 mg/dL        US renal complete   Final Result   Limited exam due to patient's body habitus and large amount of bowel   gas.        Mild-to-moderate right hydronephrosis, without definite renal stone   identified on the current exam.        MACRO:   None        Signed by: Camila Bonilla 12/5/2024 11:33 AM   Dictation workstation:   [id number]      CT abdomen pelvis wo IV contrast   Final Result   Obstructing stone in the distal right ureter as described. This   results in right hydroureteronephrosis, mild right perinephric edema,   and mild distal right periureteral edema. No other calcified stone on   the right.        Nonobstructing 3 mm stone in the left ureteropelvic junction. No   other left-sided calcified stone. No left-sided hydronephrosis.        Arthritic changes in the spine as described.        Superior endplate compression fracture at L1. This appears to be   remote.        MACRO:   None        Signed by: Prasad Burns  12/4/2024 3:56 PM   Dictation workstation:   [id number]      FL less than 1 hour    (Results Pending)   XR abdomen 1 view    (Results Pending)       Scheduled medications  ciprofloxacin, 400 mg, intravenous, q12h  divalproex, 500 mg, oral, Daily   And  divalproex, 250 mg, oral, Nightly  enoxaparin, 40 mg, subcutaneous, q24h  tamsulosin, 0.4 mg, oral, Daily      Continuous medications  sodium chloride 0.9%, 125 mL/hr, Last Rate: 125 mL/hr (12/05/24 1011)      PRN medications  PRN medications: acetaminophen **OR** acetaminophen **OR** acetaminophen, ketorolac, morphine, ondansetron         Assessment/Plan                  Principal Problem:    Nephrolithiasis  Active Problems:    Urinary tract obstruction by kidney stone      Sofya Dean is a 64 y.o. female patient with past medical history of hypothyroidism, hyperlipidemia, seizure disorder, sleep apnea on CPAP presenting with 2-day history of dysuria, nausea and right flank pain.  Patient was found to have an obstructing stone in the distal right ureter, there is also a nonobstructing 3 mm stone in the left ureteropelvic junction.  Patient was started on Cipro for urinary tract infection.  She was seen by urology, IV fluids started, patient will be n.p.o. at midnight for possible urological intervention if stone not passed.  BUN 25 creatinine 0.78, WBC 11.6.     # Nephrolithiasis  Pain control  Antiemetics  IV hydration  IV Cipro  Npo for potential urological intervention if stone not passed  Continue home medications for chronic conditions  DVT prophylaxis-Lovenox            Hayley Gallo MD

## 2024-12-05 NOTE — OP NOTE
Right Cystoscopy with Insertion Stent Ureter (R) Operative Note     Date: 2024  OR Location: [facility]    Name: Sofya Dean, : 1960, Age: 64 y.o., MRN: [id number], Sex: female    Diagnosis  Pre-op Diagnosis      * Nephrolithiasis [N20.0] Post-op Diagnosis     * Nephrolithiasis [N20.0]     Procedures  Right Cystoscopy with Insertion Stent Ureter  94733 - CO CYSTO W/INSERT URETERAL STENT   Cystoscopy, Right retrograde pyelogram, Right ureteroscopy, Right laser lithotripsy, basket extraction, Right ureteral stent placement       Surgeons      * Cristina Poe - Primary    Resident/Fellow/Other Assistant:  Surgeons and Role:     * Gini Villa MD - Assisting     * Amanda Rasheed MD - Assisting    Staff:   Hannah: Carey Huggins Person: Amy  Circulator: Angela    Anesthesia Staff: Anesthesiologist: Pedro Zaman MD  CRNA: JOURDAN Matt-CRNA    Procedure Summary  Anesthesia: General  ASA: III  Estimated Blood Loss: 1mL  Intra-op Medications:   Administrations occurring from 1430 to 1530 on 24:   Medication Name Total Dose   ciprofloxacin (Cipro) 400 mg in dextrose 5%  mL Cannot be calculated   ketorolac (Toradol) injection 15 mg Cannot be calculated   midazolam PF (Versed) injection 1 mg/mL 2 mg   morphine injection 2 mg Cannot be calculated   ondansetron (Zofran) injection 4 mg Cannot be calculated              Anesthesia Record               Intraprocedure I/O Totals       None           Specimen:   ID Type Source Tests Collected by Time   A : RIGHT URETERAL STONE Calculus Urine, Clean Catch CALCULI (STONE) ANALYSIS Cristina Poe MD 2024 1611                 Drains and/or Catheters: * None in log *    Tourniquet Times:         Implants:  Implants       Type Name Action Serial No.      Stent STENT, POLARIS ULTRA, 6FR 26CM, W/O WIRE - [id number] Implanted               Findings: small mid to distal ureteral stone, moderate narrowing of the  distal ureter    Indications: Sofya Dean is an 64 y.o. female who is having surgery for Nephrolithiasis [N20.0].     The patient was seen in the preoperative area. The risks, benefits, complications, treatment options, non-operative alternatives, expected recovery and outcomes were discussed with the patient. The possibilities of reaction to medication, pulmonary aspiration, injury to surrounding structures, bleeding, recurrent infection, the need for additional procedures, failure to diagnose a condition, and creating a complication requiring transfusion or operation were discussed with the patient. The patient concurred with the proposed plan, giving informed consent.  The site of surgery was properly noted/marked if necessary per policy. The patient has been actively warmed in preoperative area. Preoperative antibiotics have been ordered and given within 1 hours of incision. Venous thrombosis prophylaxis have been ordered including bilateral sequential compression devices    Procedure Details:     Patient consented to procedure in preoperative area. Risks and benefits discussed. Patient was marked on the right side. Allergies were reviewed and preoperative antibiotics were administered. Patient was brought to the operating room and placed in supine position on the operating room table. A timeout was performed. All were in agreement. Patient underwent general anesthesia without complication. They were repositioned in dorsal lithotomy and prepped and draped in the usual sterile fashion. A 21 fr rigid cystoscope was used to perform cystourethroscopy. Urethra was normal.  UOs were in normal orthotopic position. No masses or stones were identified.  Sensor wire was placed through a 5Fr ureteral catheter to the level of the kidney as confirmed on fluoroscopy. The catheter was then removed. A dual lumen was placed over the wire. Retrograde pyelogram was performed.     Retrograde pyelogram interpretation: Moderate  narrowing of the distal ureter from the pelvic brim down, mild tortuosity of the ureter    A second wire was advanced to the kidney through the dual lumen catheter. Then the dual lumen was removed.     A rigid ureteroscope was advanced into the right ureter.  Given how tight the distal ureter was, our second wire was removed in order to provide.  We continue to have some difficulty advancing the rigid ureteroscope, therefore we advanced the scope over a wire were able to reach the stone.  A 200 µm laser was used to dust the stone into tiny pieces.  An encircle basket was then used to remove any residual fragments.  We repeated ureteroscopy to ensure that there are no additional fragments.  Once satisfied, we removed the rigid ureteroscope and placed a 6 x 26 double-J stent with curl confirmed on fluoroscopy in the kidney and distal curl confirmed in the bladder on direct visualization.  Bladder was drained, instruments removed. This concluded the procedure. Patient was awoken from anesthesia without complication and transferred to PACU in stable condition.    Complications:  None; patient tolerated the procedure well.    Disposition: PACU - hemodynamically stable.  Condition: stable                 Additional Details: Antibiotics for 24 hours, patient will keep her stent for 3 weeks and have it removed outpatient    Attending Attestation: I was present and scrubbed for the entire procedure.    Cristina Poe  Phone Number: [phone]

## 2024-12-05 NOTE — CARE PLAN
The patient's goals for the shift include      The clinical goals for the shift include patient to feel better this shift      Problem: Pain - Adult  Goal: Verbalizes/displays adequate comfort level or baseline comfort level  Recent Flowsheet Documentation  Taken 12/5/2024 1814 by Adrienne Ding RN  Verbalizes/displays adequate comfort level or baseline comfort level:   Encourage patient to monitor pain and request assistance   Assess pain using appropriate pain scale   Implement non-pharmacological measures as appropriate and evaluate response     Problem: Safety - Adult  Goal: Free from fall injury  Recent Flowsheet Documentation  Taken 12/5/2024 1814 by Adrienne Ding RN  Free from fall injury:   Instruct family/caregiver on patient safety   Based on caregiver fall risk screen, instruct family/caregiver to ask for assistance with transferring infant if caregiver noted to have fall risk factors     Problem: Discharge Planning  Goal: Discharge to home or other facility with appropriate resources  Recent Flowsheet Documentation  Taken 12/5/2024 1814 by Adrienne Ding RN  Discharge to home or other facility with appropriate resources:   Identify barriers to discharge with patient and caregiver   Arrange for needed discharge resources and transportation as appropriate   Identify discharge learning needs (meds, wound care, etc)   Arrange for interpreters to assist at discharge as needed     Problem: Chronic Conditions and Co-morbidities  Goal: Patient's chronic conditions and co-morbidity symptoms are monitored and maintained or improved  Recent Flowsheet Documentation  Taken 12/5/2024 1814 by Adrienne Ding RN  Care Plan - Patient's Chronic Conditions and Co-Morbidity Symptoms are Monitored and Maintained or Improved:   Update acute care plan with appropriate goals if chronic or comorbid symptoms are exacerbated and prevent overall improvement and discharge   Collaborate with multidisciplinary team to address chronic and  comorbid conditions and prevent exacerbation or deterioration   Monitor and assess patient's chronic conditions and comorbid symptoms for stability, deterioration, or improvement     Problem: Pain - Adult  Goal: Verbalizes/displays adequate comfort level or baseline comfort level  Flowsheets (Taken 12/5/2024 1814)  Verbalizes/displays adequate comfort level or baseline comfort level:   Encourage patient to monitor pain and request assistance   Assess pain using appropriate pain scale   Implement non-pharmacological measures as appropriate and evaluate response

## 2024-12-05 NOTE — H&P
History Of Present Illness  Sofya Dean is a 64 y.o. female patient with past medical history of hypothyroidism, hyperlipidemia, seizure disorder, sleep apnea on CPAP presenting with 2-day history of dysuria, nausea and right flank pain.  Patient was found to have an obstructing stone in the distal right ureter, there is also a nonobstructing 3 mm stone in the left ureteropelvic junction.  Patient was started on Cipro for urinary tract infection.  She was seen by urology, IV fluids started, patient will be n.p.o. at midnight for possible urological intervention if stone not passed.  BUN 25 creatinine 0.78, WBC 11.6.     Past Medical History  She has a past medical history of Acute vaginitis (07/23/2019), Asymptomatic menopausal state (10/29/2021), Disorder of the skin and subcutaneous tissue, unspecified (12/28/2016), Encounter for general adult medical examination without abnormal findings (12/28/2016), Encounter for screening for other viral diseases (02/19/2018), Immunization not carried out because of patient refusal (12/28/2016), Low back pain, unspecified (01/03/2019), Other specified joint disorders, unspecified shoulder (09/30/2015), Pain in right shoulder (09/30/2015), Personal history of other specified conditions (01/10/2017), and Wedge compression fracture of first lumbar vertebra, initial encounter for closed fracture (Multi) (09/05/2018).    Surgical History  She has a past surgical history that includes Other surgical history (10/01/2015) and Colonoscopy w/ polypectomy (12/03/2016).     Social History  She reports that she has never smoked. She has never used smokeless tobacco. She reports current alcohol use. She reports that she does not use drugs.    Family History  No family history on file.     Allergies  Cefadroxil, Chlorpheniramine-pseudoephed, and Iodine    Review of Systems   Constitutional: Negative.    HENT: Negative.     Respiratory: Negative.     Cardiovascular: Negative.     Gastrointestinal:  Positive for nausea.   Genitourinary:  Positive for dysuria and flank pain.   Neurological: Negative.    Psychiatric/Behavioral: Negative.          Physical Exam  HENT:      Head: Normocephalic.      Mouth/Throat:      Mouth: Mucous membranes are moist.   Cardiovascular:      Rate and Rhythm: Normal rate.      Pulses: Normal pulses.   Pulmonary:      Effort: Pulmonary effort is normal.   Abdominal:      General: Bowel sounds are normal.   Genitourinary:     Comments: Right flank pain   Musculoskeletal:         General: Normal range of motion.      Cervical back: Neck supple.   Skin:     General: Skin is warm.   Neurological:      Mental Status: She is alert and oriented to person, place, and time.   Psychiatric:         Mood and Affect: Mood normal.          Last Recorded Vitals  /62   Pulse 93   Temp 36.9 °C (98.5 °F) (Temporal)   Resp 18   Wt 90.7 kg (200 lb)   SpO2 94%     Relevant Results        Results for orders placed or performed during the hospital encounter of 12/04/24 (from the past 24 hours)   CBC and Auto Differential   Result Value Ref Range    WBC 11.6 (H) 4.4 - 11.3 x10*3/uL    nRBC 0.0 0.0 - 0.0 /100 WBCs    RBC 4.53 4.00 - 5.20 x10*6/uL    Hemoglobin 13.5 12.0 - 16.0 g/dL    Hematocrit 42.2 36.0 - 46.0 %    MCV 93 80 - 100 fL    MCH 29.8 26.0 - 34.0 pg    MCHC 32.0 32.0 - 36.0 g/dL    RDW 13.8 11.5 - 14.5 %    Platelets 226 150 - 450 x10*3/uL    Neutrophils % 68.1 40.0 - 80.0 %    Immature Granulocytes %, Automated 0.5 0.0 - 0.9 %    Lymphocytes % 22.8 13.0 - 44.0 %    Monocytes % 6.6 2.0 - 10.0 %    Eosinophils % 1.7 0.0 - 6.0 %    Basophils % 0.3 0.0 - 2.0 %    Neutrophils Absolute 7.87 (H) 1.20 - 7.70 x10*3/uL    Immature Granulocytes Absolute, Automated 0.06 0.00 - 0.70 x10*3/uL    Lymphocytes Absolute 2.64 1.20 - 4.80 x10*3/uL    Monocytes Absolute 0.76 0.10 - 1.00 x10*3/uL    Eosinophils Absolute 0.20 0.00 - 0.70 x10*3/uL    Basophils Absolute 0.03 0.00 - 0.10  x10*3/uL   Comprehensive metabolic panel   Result Value Ref Range    Glucose 111 (H) 74 - 99 mg/dL    Sodium 142 136 - 145 mmol/L    Potassium 4.1 3.5 - 5.3 mmol/L    Chloride 105 98 - 107 mmol/L    Bicarbonate 29 21 - 32 mmol/L    Anion Gap 12 10 - 20 mmol/L    Urea Nitrogen 25 (H) 6 - 23 mg/dL    Creatinine 0.78 0.50 - 1.05 mg/dL    eGFR 84 >60 mL/min/1.73m*2    Calcium 11.5 (H) 8.6 - 10.3 mg/dL    Albumin 4.5 3.4 - 5.0 g/dL    Alkaline Phosphatase 49 33 - 136 U/L    Total Protein 7.2 6.4 - 8.2 g/dL    AST 14 9 - 39 U/L    Bilirubin, Total 0.4 0.0 - 1.2 mg/dL    ALT 15 7 - 45 U/L   Magnesium   Result Value Ref Range    Magnesium 1.72 1.60 - 2.40 mg/dL   Lipase   Result Value Ref Range    Lipase 26 9 - 82 U/L   Lactate   Result Value Ref Range    Lactate 1.5 0.4 - 2.0 mmol/L   Urinalysis with Reflex Culture and Microscopic   Result Value Ref Range    Color, Urine Yellow Light-Yellow, Yellow, Dark-Yellow    Appearance, Urine Turbid (N) Clear    Specific Gravity, Urine 1.023 1.005 - 1.035    pH, Urine 5.5 5.0, 5.5, 6.0, 6.5, 7.0, 7.5, 8.0    Protein, Urine 20 (TRACE) NEGATIVE, 10 (TRACE), 20 (TRACE) mg/dL    Glucose, Urine Normal Normal mg/dL    Blood, Urine 1.0 (3+) (A) NEGATIVE    Ketones, Urine 10 (1+) (A) NEGATIVE mg/dL    Bilirubin, Urine NEGATIVE NEGATIVE    Urobilinogen, Urine Normal Normal mg/dL    Nitrite, Urine NEGATIVE NEGATIVE    Leukocyte Esterase, Urine 25 Tawnya/µL (A) NEGATIVE   Microscopic Only, Urine   Result Value Ref Range    WBC, Urine 21-50 (A) 1-5, NONE /HPF    RBC, Urine >20 (A) NONE, 1-2, 3-5 /HPF    Squamous Epithelial Cells, Urine 1-9 (SPARSE) Reference range not established. /HPF    Budding Yeast, Urine PRESENT (A) NONE /HPF    Mucus, Urine FEW Reference range not established. /LPF   Blood Culture    Specimen: Peripheral Venipuncture; Blood culture   Result Value Ref Range    Blood Culture Loaded on Instrument - Culture in progress    Blood Culture    Specimen: Peripheral Venipuncture; Blood  culture   Result Value Ref Range    Blood Culture Loaded on Instrument - Culture in progress           Assessment/Plan   Assessment & Plan  Nephrolithiasis    Urinary tract obstruction by kidney stone      Sofya Dean is a 64 y.o. female patient with past medical history of hypothyroidism, hyperlipidemia, seizure disorder, sleep apnea on CPAP presenting with 2-day history of dysuria, nausea and right flank pain.  Patient was found to have an obstructing stone in the distal right ureter, there is also a nonobstructing 3 mm stone in the left ureteropelvic junction.  Patient was started on Cipro for urinary tract infection.  She was seen by urology, IV fluids started, patient will be n.p.o. at midnight for possible urological intervention if stone not passed.  BUN 25 creatinine 0.78, WBC 11.6.    Plan:  Pain control  Antiemetics  IV hydration  IV Cipro  P.o. at midnight for potential urological intervention if stone not passed  Continue home medications for chronic conditions  DVT prophylaxis-Lovenox       John Ordoñez, APRN-CNP

## 2024-12-05 NOTE — CONSULTS
"Reason For Consult  Right ureteral stone    History Of Present Illness  Sofya Dean is a 64 y.o. female presenting with right flank pain. Has right 6mm right ureteral stone, 3mm left upj stone. Right sided hydro present  Cr wnl     Past Medical History  She has a past medical history of Abnormal Pap smear of cervix, Difficult intubation (03/04/2024), Epilepsy, Hyperlipidemia, Hypothyroidism, Nephrolithiasis, GLENIS (obstructive sleep apnea), and Varicella.    Surgical History  She has a past surgical history that includes Colonoscopy w/ polypectomy; Eye surgery; Cosmetic surgery; Endometrial ablation; and Cervical biopsy w/ loop electrode excision.     Social History  She reports that she has never smoked. She has never used smokeless tobacco. She reports current alcohol use. She reports that she does not use drugs.    Family History  Family History   Problem Relation Name Age of Onset    Cancer Mother Kinjal Sarmiento     Hypertension Mother Kinjal Sarmiento     Cancer Father Lane Sarmiento     Kidney disease Father Lane Sarmiento         Allergies  Cefadroxil, Chlorpheniramine-pseudoephed, and Iodine    Review of Systems     Physical Exam  CONSTITUTIONAL:        No acute distress    HEAD:        Normocephalic and atraumatic    CHEST / RESPIRATORY      no excess work of breathing, no respiratory distress,    ABDOMEN / GASTROINTESTINAL:        Abdomen nondistended         Last Recorded Vitals  Blood pressure 142/82, pulse 81, temperature 36.6 °C (97.9 °F), temperature source Temporal, resp. rate 18, height 1.702 m (5' 7\"), weight 90.7 kg (200 lb), SpO2 95%.    Relevant Results      XR abdomen 1 view - personally reviewed/interpreted    Result Date: 12/5/2024  Interpreted By:  Krissy Chandler, STUDY: XR ABDOMEN 1 VIEW;  12/5/2024 1:25 pm   INDICATION: Signs/Symptoms:assess ureteral stone.     COMPARISON: CT abdomen pelvis 12/04/2024   ACCESSION NUMBER(S): [id number]   ORDERING CLINICIAN: KRISSY WHITTINGTON   FINDINGS: 2 supine views of the " abdomen obtained.   Scattered gas and fecal debris in nondilated loops of bowel. Ring shadows in the pelvis likely reflecting tubal ligation clips. Questionable density inferior to the right-sided tubal ligation clip may reflect known ureteral calculus by CT. No obvious abdominal mass effect. Can not accurately assess for free air on supine radiographs.   Short band density in the left lung base.   Multifocal osseous presumed degenerative changes.       Nonspecific, nonobstructive bowel gas pattern.   Questionable revisualization of distal ureteral calculus seen on previous day's CT as above.   Minimal bandlike possible scarring or atelectasis in the left lung base.   MACRO: None.   Signed by: Lizzette Chandler 12/5/2024 1:38 PM Dictation workstation:   [id number]    US renal complete - personally reviewed/interpreted    Result Date: 12/5/2024  Interpreted By:  Camila Bonilla, STUDY: US RENAL COMPLETE;  12/5/2024 11:05 am   INDICATION: Signs/Symptoms:assess for passage of right ureteral stone.   COMPARISON: None. 12/05/2020   ACCESSION NUMBER(S): [id number]   ORDERING CLINICIAN: LIZZETTE WHITTINGTON   TECHNIQUE: Multiple images of the kidneys were obtained  .   FINDINGS: The study is limited due to patient's body habitus and large amount of bowel gas.   RIGHT KIDNEY: The right kidney measures 11.3 in length. The renal cortical echogenicity is within normal limits and thickness is within normal limits. Mild/moderate hydronephrosis is present; no evidence of nephrolithiasis.   LEFT KIDNEY: The left kidney measures 11.7 in length. The renal cortical echogenicity is within normal limits and thickness is within normal limits. No hydronephrosis is present; no evidence of nephrolithiasis.   BLADDER: The urinary bladder is unremarkable in appearance.       Limited exam due to patient's body habitus and large amount of bowel gas.   Mild-to-moderate right hydronephrosis, without definite renal stone identified on the current exam.    MACRO: None   Signed by: Camila Bonilla 12/5/2024 11:33 AM Dictation workstation:   [id number]    CT abdomen pelvis wo IV contrast - personally reviewed/interpreted    Result Date: 12/4/2024  Interpreted By:  Prasad Bunrs, STUDY: CT ABDOMEN PELVIS WO IV CONTRAST;  12/4/2024 3:41 pm   INDICATION: 63 y/o   F with  Signs/Symptoms:R flank pain with urinary symptoms r/o stone/pyelo.     LIMITATIONS: Evaluation of the solid organs is limited due to non use of IV contrast.   ACCESSION NUMBER(S): [id number]   ORDERING CLINICIAN: VIET SANTORO   TECHNIQUE: Thin-section noncontrast spiral axial images were obtained from the xiphoid down through the symphysis pubis. Sagittal and coronal reconstruction images were generated. Bone, mediastinal, lung, and liver windows were reviewed.   COMPARISON: None.   FINDINGS: LUNG BASES: Mild scarring versus atelectasis at the lung bases. Bilateral breast implants. No mass or pneumonia or pleural effusion in either lung base..   LIVER: No hepatomegaly. Liver density was  within the limits of normal. No gross liver lesion in this unenhaced exam.   GALLBLADDER: No calcified stone, gallbladder wall thickening, or adjacent edema.   BILE DUCTS: No intrahepatic biliary ductal dilatation. Common bile duct was within the limits of normal.   SPLEEN: No splenomegaly. No gross splenic mass..   PANCREAS: No pancreatic mass or inflammation, or ductal dilatation.   KIDNEYS/ADRENALS: No adrenal mass or enlargement. There is a nonobstructing 3 mm stone in the left ureteropelvic junction. This does not result in obstruction or edema in this exam. No other left-sided calcified stone. No left-sided hydronephrosis or perinephric edema. No other left ureteral stone.   There is an obstructing stone in the distal right ureter just above the right ureterovesical junction. This stone measures 6 x 2 x 4 mm. It is evident on axial image 127 and on coronal image 64. It results in upstream mild-to-moderate right  hydronephrosis and hydroureter with perinephric edema and mild periureteral edema distally. No other right ureteral stone or right renal stone. .   BLADDER/PELVIS: Urinary bladder was grossly intact. No uterine enlargement or gross adnexal mass. There are bilateral tubal ligation clips.   GREAT VESSELS/RETROPERITONEUM: Mild aortoiliac calcifications. Otherwise, the abdominal aorta and IVC were intact. No suspicious retroperitoneal adenopathy. No suspicious mesenteric adenopathy. No suspicious pelvic or inguinal adenopathy.   PERITONEUM: No ascites. No pneumoperitoneum. No peritoneal or mesenteric mass or inflammation.   BOWEL: The stomach was grossly intact. There was no small bowel dilatation or small bowel wall thickening. No small-bowel obstruction. There was no colonic wall thickening or large bowel obstruction.  No edema adjacent to the colon. The cecal appendix was intact.   BONES: No destructive lytic or blastic bone lesion. There is vacuum disc phenomenon at T12-L1 and L5-S1. There is a superior endplate compression fracture with approximately 40-50% loss of height at L1, most likely remote. Mild multilevel endplate osteophytosis. Moderate disc space narrowing at L5-S1.   ABDOMINAL WALL: Unremarkable.       Obstructing stone in the distal right ureter as described. This results in right hydroureteronephrosis, mild right perinephric edema, and mild distal right periureteral edema. No other calcified stone on the right.   Nonobstructing 3 mm stone in the left ureteropelvic junction. No other left-sided calcified stone. No left-sided hydronephrosis.   Arthritic changes in the spine as described.   Superior endplate compression fracture at L1. This appears to be remote.   MACRO: None   Signed by: Prasad Burns 12/4/2024 3:56 PM Dictation workstation:   [id number]        Assessment/Plan   RIGHT ureteral stone, distal. Ucx negative; also has LEFt stone, nonobs, in kidney  -We discussed the risks, benefits,  alternatives and complications associated with ureteroscopy with laser lithotripsy and stone extraction, including but not limited to ureteral perforation, extravasation, stricture formation, bleeding, sepsis, obstruction, inability to reach the stone, inability to fragment the stone, and inability to retrieve all stone fragments, etc.  -we also discussed other options  -We also discussed the need for ancillary procedures such as stent placement and removal, retrograde urography, aand the patient was given the opportunity to ask any questions. All questions were answered to her  satisfaction.   -The patient also understood that a ureteral stent would likely be placed and removal of the stent is critical.     -will proceed with RIGHT URS/laser/stent      Cristina Poe MD

## 2024-12-05 NOTE — ANESTHESIA PROCEDURE NOTES
Airway  Date/Time: 12/5/2024 3:38 PM  Urgency: elective    Airway not difficult    Staffing  Performed: CRNA   Authorized by: Pedro Zaman MD    Performed by: JOURDAN Matt-CRNA  Patient location during procedure: OR    Indications and Patient Condition  Indications for airway management: anesthesia  Spontaneous Ventilation: absent  Sedation level: deep  Preoxygenated: yes  Patient position: sniffing  Mask difficulty assessment: 0 - not attempted  Planned trial extubation    Final Airway Details  Final airway type: supraglottic airway      Successful airway: classic  Size 4     Number of attempts at approach: 1

## 2024-12-05 NOTE — DISCHARGE INSTRUCTIONS
DEPARTMENT OF Urology  DISCHARGE INSTRUCTIONS     Ureteroscopy Laser Lithotripsy +/- Ureteral Stent Placement  Outpatient Surgery    C O N F I D E N T I A L   I N F O R M A T I O N    Sofya Dean    Call [phone] during regular daytime business hours (8:00 am - 5:00 pm) and after 5:00 pm ask for the Urology resident with any questions or concerns.    If it is a life-threatening situation, proceed to the nearest emergency department.        Follow-up appointment:    Future Appointments   Date Time Provider Department Center   12/18/2024 11:00 AM [facility] [id number] DXA 1 [id number] [place] [facility]   [facility]2025  [id number]   7/9/2025 10:45 AM DUANE [id number] [facility]   11/18/2025  1:30 PM Avis Odell MD [id number] [facility]           Thank you for the opportunity to care for you today.  Your health and healing are very important to us.  We hope we made you feel as comfortable as possible and are committed to your recovery and continued well-being.      The following is a brief overview of your Ureteroscopy Laser Lithotripsy procedure today. Some of the information contained on this summary may be confidential.  This information should be kept in your records and should be shared with your regular doctor.    Physicians:   Dr. Poe    Procedure performed: Lasering of your kidney stone    What to Expect During your Recovery and Home Care  Anesthesia Side Effects   You received anesthesia today.  You may feel sleepy, tired, or have a sore throat.   You may also feel drowsiness, dizziness, or inability to think clearly.  For your safety, do not drive, drink alcoholic beverages, take any unprescribed medication or make any important decisions for 24 hours.  A responsible adult should be with you for 24 hours.        Activity and Recovery    No heavy lifting today. Rest for the next 24 hours.    Pain Control  Unfortunately, you may experience pain after your  procedure.  Frequency and urgency to urinate and mild discomfort are expected. Adequate pain management can include alternative measures to help ease your pain and that can include over the counter Tylenol/ibuprofen and a heating pad. Do not take more than 4,000mg of Tylenol in a 24-hour period.      You may have also been prescribed Pyridium (for burning sensation) and Ditropan(for bladder spasms) for pain control. Pyridium will turn your urine bright orange.    Nausea/Vomiting   Clear liquids are best tolerated at first. Start slow, advance your diet as tolerated to normal foods. Avoid spicy, greasy, heavy foods at first. Also, you may feel nauseous or like you need to vomit if you take any type of medication on an empty stomach.  Call your physician if you are unable to eat or drink and have persistent vomiting.    Signs of Bleeding   You could have some blood in your urine off and on over the next several weeks. Your urine will be light pink to yellow.    Treatment/wound care:   It is okay to shower 24 hours after time of surgery.    Signs of Infection  Signs of infection can include fever, chills, burning sensation with passing of urine, or severe abdominal pain.  If you see any of these occur, please contact your doctor's office at [phone].  Any fever higher than 100.4, especially if associated with an ill feeling, abdominal pain, chills, or nausea should be reported to your surgeon.      Assist in bowel movements/urination  Increase fiber in diet  Increase water (6 to 8 glasses)  Increase walking   Urination should occur within 6 hours of anesthesia  If you have tried these methods and your bladder still feels full and you cannot use the bathroom, please go to your nearest Emergency room/contact your physician.    Additional Instructions:   Pieces of your kidney stone may pass in the urine for a few days and may cause some mild pain. You also had a stent placed today from your kidney down into your  bladder. This helps keep the urinary tract open and prevents blockages of urine flow. The stent can be irritating and can cause some frequency, urgency and blood in your urine when you go to the bathroom. It is important to drink plenty of water and take medications as prescribed. You may be sent home with Pyridium which turns your urine bright orange. Applying a heating pad to your back will also help with this kind of pain.  Your stent will be removed at your follow up appointment (at least 2 weeks).

## 2024-12-05 NOTE — ANESTHESIA PREPROCEDURE EVALUATION
Patient: Sofya Dean    Procedure Information       Date/Time: 12/05/24 1430    Procedure: Right Cystoscopy with Insertion Stent Ureter (Right)    Location: [facility] OR 01 / Virtual [facility] OR    Surgeons: Cristina Poe MD            Relevant Problems   Cardiac   (+) Hyperlipidemia      Neuro   (+) Seizure disorder (Multi)      /Renal   (+) Nephrolithiasis      Endocrine   (+) Hypothyroidism       Clinical information reviewed:   Tobacco  Allergies  Meds   Med Hx  Surg Hx   Fam Hx  Soc Hx         Past Medical History:   Diagnosis Date   • Abnormal Pap smear of cervix    • Difficult intubation 03/04/2024    Difficult mask: Oral airway and 2 hand required to BMV. Grade 3 view with mas. 2B with glidescope.   • Epilepsy    • Hyperlipidemia    • Hypothyroidism    • Nephrolithiasis    • GLENIS (obstructive sleep apnea)    • Varicella       Past Surgical History:   Procedure Laterality Date   • CERVICAL BIOPSY  W/ LOOP ELECTRODE EXCISION     • COLONOSCOPY W/ POLYPECTOMY     • COSMETIC SURGERY      Breast augmentation, Implant replacement 03/04/2024   • ENDOMETRIAL ABLATION     • EYE SURGERY       Social History     Tobacco Use   • Smoking status: Never   • Smokeless tobacco: Never   Vaping Use   • Vaping status: Never Used   Substance Use Topics   • Alcohol use: Yes     Comment: social   • Drug use: Never      Current Outpatient Medications   Medication Instructions   • calcium carbonate 600 mg calcium (1,500 mg) tablet 1 tablet, oral, Daily   • divalproex (Depakote ER) 250 mg 24 hr tablet Take 2 tablets (500 mg) by mouth once daily in the morning AND 1 tablet (250 mg) once daily in the evening. Do not crush, chew, or split..   • fish oil concentrate (Omega-3) 120-180 mg capsule 1 g, oral, Daily   • multivitamin tablet 1 tablet, oral, Daily   • polyethylene glycol-electrolytes (Golytely) 420 gram solution STARTING AT 6PM DRINK HALF OF THE BOTTLE, DRINK THE OTHER HALF 5 HRS BEFORE PROCEDURE TIME   •  "rosuvastatin (CRESTOR) 10 mg, oral, Daily   • sodium,potassium,mag sulfates (Suprep) 17.5-3.13-1.6 gram recon soln solution Take one bottle beginning at 6pm night before procedure and then take the other bottle 5 hours before procedure time as directed per instruction sheet      Allergies   Allergen Reactions   • Cefadroxil Other   • Chlorpheniramine-Pseudoephed Hives   • Iodine Hives and Swelling        Chemistry    Lab Results   Component Value Date/Time     12/05/2024 0701    K 3.8 12/05/2024 0701     (H) 12/05/2024 0701    CO2 26 12/05/2024 0701    BUN 23 12/05/2024 0701    CREATININE 0.66 12/05/2024 0701    Lab Results   Component Value Date/Time    CALCIUM 9.0 12/05/2024 0701    ALKPHOS 49 12/04/2024 1242    AST 14 12/04/2024 1242    ALT 15 12/04/2024 1242    BILITOT 0.4 12/04/2024 1242          No results found for: \"HGBA1C\"  Lab Results   Component Value Date/Time    WBC 6.7 12/05/2024 0701    HGB 11.2 (L) 12/05/2024 0701    HCT 34.6 (L) 12/05/2024 0701     12/05/2024 0701     No results found for: \"PROTIME\", \"PTT\", \"INR\"  No results found for: \"ABORH\"  Encounter Date: 07/03/24   ECG 12 lead    Narrative    Normal sinus rhythm      No results found for this or any previous visit from the past 1095 days.       Visit Vitals  /82 (Patient Position: Lying)   Pulse 81   Temp 36.6 °C (97.9 °F) (Temporal)   Resp 18   Ht 1.702 m (5' 7\")   Wt 90.7 kg (200 lb)   SpO2 95%   BMI 31.32 kg/m²   OB Status Postmenopausal   Smoking Status Never   BSA 2.07 m²     No data recorded    Physical Exam    Airway  Mallampati: II  TM distance: >3 FB  Neck ROM: full     Cardiovascular   Rhythm: regular  Rate: normal     Dental    Pulmonary   Breath sounds clear to auscultation     Abdominal          Anesthesia Plan    History of general anesthesia?: yes  History of complications of general anesthesia?: no    ASA 3     general     intravenous induction   Anesthetic plan and risks discussed with patient.    Plan " discussed with CRNA.

## 2024-12-06 ENCOUNTER — TELEPHONE (OUTPATIENT)
Dept: PRIMARY CARE | Facility: CLINIC | Age: 64
End: 2024-12-06
Payer: MEDICARE

## 2024-12-06 ENCOUNTER — PHARMACY VISIT (OUTPATIENT)
Dept: PHARMACY | Facility: CLINIC | Age: 64
End: 2024-12-06
Payer: COMMERCIAL

## 2024-12-06 VITALS
WEIGHT: 199.98 LBS | TEMPERATURE: 97.5 F | SYSTOLIC BLOOD PRESSURE: 144 MMHG | BODY MASS INDEX: 31.39 KG/M2 | HEART RATE: 74 BPM | HEIGHT: 67 IN | RESPIRATION RATE: 18 BRPM | OXYGEN SATURATION: 95 % | DIASTOLIC BLOOD PRESSURE: 82 MMHG

## 2024-12-06 LAB
ANION GAP SERPL CALC-SCNC: 10 MMOL/L (ref 10–20)
BUN SERPL-MCNC: 19 MG/DL (ref 6–23)
CALCIUM SERPL-MCNC: 9.1 MG/DL (ref 8.6–10.3)
CHLORIDE SERPL-SCNC: 108 MMOL/L (ref 98–107)
CO2 SERPL-SCNC: 26 MMOL/L (ref 21–32)
CREAT SERPL-MCNC: 0.64 MG/DL (ref 0.5–1.05)
EGFRCR SERPLBLD CKD-EPI 2021: >90 ML/MIN/1.73M*2
ERYTHROCYTE [DISTWIDTH] IN BLOOD BY AUTOMATED COUNT: 13.5 % (ref 11.5–14.5)
GLUCOSE SERPL-MCNC: 144 MG/DL (ref 74–99)
HCT VFR BLD AUTO: 37 % (ref 36–46)
HGB BLD-MCNC: 12.2 G/DL (ref 12–16)
MCH RBC QN AUTO: 30.5 PG (ref 26–34)
MCHC RBC AUTO-ENTMCNC: 33 G/DL (ref 32–36)
MCV RBC AUTO: 93 FL (ref 80–100)
NRBC BLD-RTO: 0 /100 WBCS (ref 0–0)
PLATELET # BLD AUTO: 201 X10*3/UL (ref 150–450)
POTASSIUM SERPL-SCNC: 4 MMOL/L (ref 3.5–5.3)
RBC # BLD AUTO: 4 X10*6/UL (ref 4–5.2)
SODIUM SERPL-SCNC: 140 MMOL/L (ref 136–145)
WBC # BLD AUTO: 7.3 X10*3/UL (ref 4.4–11.3)

## 2024-12-06 PROCEDURE — 99232 SBSQ HOSP IP/OBS MODERATE 35: CPT

## 2024-12-06 PROCEDURE — 99239 HOSP IP/OBS DSCHRG MGMT >30: CPT | Performed by: INTERNAL MEDICINE

## 2024-12-06 PROCEDURE — 2500000001 HC RX 250 WO HCPCS SELF ADMINISTERED DRUGS (ALT 637 FOR MEDICARE OP): Performed by: HOSPITALIST

## 2024-12-06 PROCEDURE — 2500000001 HC RX 250 WO HCPCS SELF ADMINISTERED DRUGS (ALT 637 FOR MEDICARE OP): Performed by: STUDENT IN AN ORGANIZED HEALTH CARE EDUCATION/TRAINING PROGRAM

## 2024-12-06 PROCEDURE — 2500000002 HC RX 250 W HCPCS SELF ADMINISTERED DRUGS (ALT 637 FOR MEDICARE OP, ALT 636 FOR OP/ED): Performed by: STUDENT IN AN ORGANIZED HEALTH CARE EDUCATION/TRAINING PROGRAM

## 2024-12-06 PROCEDURE — 85027 COMPLETE CBC AUTOMATED: CPT | Performed by: STUDENT IN AN ORGANIZED HEALTH CARE EDUCATION/TRAINING PROGRAM

## 2024-12-06 PROCEDURE — 80048 BASIC METABOLIC PNL TOTAL CA: CPT | Performed by: STUDENT IN AN ORGANIZED HEALTH CARE EDUCATION/TRAINING PROGRAM

## 2024-12-06 PROCEDURE — 36415 COLL VENOUS BLD VENIPUNCTURE: CPT | Performed by: STUDENT IN AN ORGANIZED HEALTH CARE EDUCATION/TRAINING PROGRAM

## 2024-12-06 PROCEDURE — RXMED WILLOW AMBULATORY MEDICATION CHARGE

## 2024-12-06 RX ORDER — CIPROFLOXACIN 500 MG/1
500 TABLET ORAL EVERY 12 HOURS SCHEDULED
Status: DISCONTINUED | OUTPATIENT
Start: 2024-12-06 | End: 2024-12-06

## 2024-12-06 RX ORDER — PHENAZOPYRIDINE HYDROCHLORIDE 100 MG/1
100 TABLET, FILM COATED ORAL 3 TIMES DAILY PRN
Qty: 30 TABLET | Refills: 0 | Status: SHIPPED | OUTPATIENT
Start: 2024-12-06

## 2024-12-06 ASSESSMENT — PAIN - FUNCTIONAL ASSESSMENT: PAIN_FUNCTIONAL_ASSESSMENT: 0-10

## 2024-12-06 ASSESSMENT — COGNITIVE AND FUNCTIONAL STATUS - GENERAL
DAILY ACTIVITIY SCORE: 24
CLIMB 3 TO 5 STEPS WITH RAILING: A LITTLE
MOBILITY SCORE: 23

## 2024-12-06 ASSESSMENT — PAIN SCALES - GENERAL: PAINLEVEL_OUTOF10: 3

## 2024-12-06 NOTE — PROGRESS NOTES
12/06/24 1250   Discharge Planning   Home or Post Acute Services None   Expected Discharge Disposition Home   Does the patient need discharge transport arranged? No     Patient has been cleared for discharge home.  HHC: none  DME: none  O2: none  Wounds: none  Patient denies any discharge needs.  Spouse will transport home.  Gretta Rivera RN

## 2024-12-06 NOTE — PROGRESS NOTES
"Sofya Dean is a 64 y.o. female on day 2 of admission presenting with Nephrolithiasis.    Subjective   Doing well after her procedure. Tolerating diet. Voiding well. No further complaints.    Objective   PE:  Constitutional: A&Ox3, calm and cooperative, NAD  Cardiovascular: Normal rate and regular rhythm.  Respiratory/Thorax: Good symmetric chest expansion. No labored breathing.  Genitourinary: Voiding independently   Neurological: A&Ox3, No focal deficits  Psychological: Appropriate mood and behavior  Skin: Warm and dry    Last Recorded Vitals  Blood pressure 135/81, pulse 77, temperature 36.4 °C (97.5 °F), temperature source Temporal, resp. rate 19, height 1.702 m (5' 7.01\"), weight 90.7 kg (199 lb 15.7 oz), SpO2 98%.  Intake/Output last 3 Shifts:  I/O last 3 completed shifts:  In: 2206.3 (24.3 mL/kg) [I.V.:2206.3 (24.3 mL/kg)]  Out: 1475 (16.3 mL/kg) [Urine:1475 (0.5 mL/kg/hr)]  Weight: 90.7 kg     Relevant Results  Scheduled medications  divalproex, 500 mg, oral, Daily   And  divalproex, 250 mg, oral, Nightly  enoxaparin, 40 mg, subcutaneous, q24h  phenazopyridine, 100 mg, oral, TID  tamsulosin, 0.4 mg, oral, Daily      Continuous medications     PRN medications  PRN medications: acetaminophen **OR** acetaminophen **OR** acetaminophen, ketorolac, morphine, ondansetron    Results for orders placed or performed during the hospital encounter of 12/04/24 (from the past 24 hours)   CBC   Result Value Ref Range    WBC 7.3 4.4 - 11.3 x10*3/uL    nRBC 0.0 0.0 - 0.0 /100 WBCs    RBC 4.00 4.00 - 5.20 x10*6/uL    Hemoglobin 12.2 12.0 - 16.0 g/dL    Hematocrit 37.0 36.0 - 46.0 %    MCV 93 80 - 100 fL    MCH 30.5 26.0 - 34.0 pg    MCHC 33.0 32.0 - 36.0 g/dL    RDW 13.5 11.5 - 14.5 %    Platelets 201 150 - 450 x10*3/uL   Basic Metabolic Panel   Result Value Ref Range    Glucose 144 (H) 74 - 99 mg/dL    Sodium 140 136 - 145 mmol/L    Potassium 4.0 3.5 - 5.3 mmol/L    Chloride 108 (H) 98 - 107 mmol/L    Bicarbonate 26 21 - " 32 mmol/L    Anion Gap 10 10 - 20 mmol/L    Urea Nitrogen 19 6 - 23 mg/dL    Creatinine 0.64 0.50 - 1.05 mg/dL    eGFR >90 >60 mL/min/1.73m*2    Calcium 9.1 8.6 - 10.3 mg/dL       Assessment/Plan     Hemodynamically stable. Afebrile. AM blood work reviewed.    Plan:  - Regular diet  - OOB/ambulation  - DVT proph: SCDs/lovenox  - Follow-up with Dr SANCHEZ on 12/27/24 for follow-up and stent removal    Urology to sign off, we are available as needed if there are any new questions or concerns.    Discussed with Dr Poe.    I spent 35 minutes in the professional and overall care of this patient.    Robert Price PA-C

## 2024-12-06 NOTE — CARE PLAN
The patient's goals for the shift include      The clinical goals for the shift include patient to feel better this shift      Problem: Pain - Adult  Goal: Verbalizes/displays adequate comfort level or baseline comfort level  Outcome: Progressing     Problem: Safety - Adult  Goal: Free from fall injury  Outcome: Progressing     Problem: Discharge Planning  Goal: Discharge to home or other facility with appropriate resources  Outcome: Progressing     Problem: Chronic Conditions and Co-morbidities  Goal: Patient's chronic conditions and co-morbidity symptoms are monitored and maintained or improved  Outcome: Progressing

## 2024-12-06 NOTE — ANESTHESIA POSTPROCEDURE EVALUATION
Patient: Sofya Dean    Procedure Summary       Date: 12/05/24 Room / Location: U A OR 01 / Virtual [facility] A OR    Anesthesia Start: 1530 Anesthesia Stop: 1628    Procedure: Right Cystoscopy with Insertion Stent Ureter (Right) Diagnosis:       Nephrolithiasis      (Nephrolithiasis [N20.0])    Surgeons: Cristina Poe MD Responsible Provider: Pdero Zaman MD    Anesthesia Type: general ASA Status: 3            Anesthesia Type: general    Vitals Value Taken Time   /61 12/05/24 1700   Temp 36.6 °C (97.9 °F) 12/05/24 1629   Pulse 80 12/05/24 1700   Resp 16 12/05/24 1700   SpO2 98 % 12/05/24 1700       Anesthesia Post Evaluation    Patient location during evaluation: bedside  Patient participation: complete - patient participated  Level of consciousness: awake  Pain management: adequate  Multimodal analgesia pain management approach  Airway patency: patent  Cardiovascular status: stable  Respiratory status: spontaneous ventilation and unassisted  Hydration status: acceptable  Postoperative Nausea and Vomiting: none  Comments: No significant PONV.        No notable events documented.

## 2024-12-06 NOTE — DISCHARGE SUMMARY
Discharge Diagnosis  Nephrolithiasis    Issues Requiring Follow-Up  PCP and urology follow up    Discharge Meds     Medication List      CONTINUE taking these medications     calcium carbonate 600 mg calcium (1,500 mg) tablet   divalproex 250 mg 24 hr tablet; Commonly known as: Depakote ER; Take 2   tablets (500 mg) by mouth once daily in the morning AND 1 tablet (250 mg)   once daily in the evening. Do not crush, chew, or split..   fish oil concentrate 120-180 mg capsule; Commonly known as: Omega-3   multivitamin tablet     ASK your doctor about these medications     Golytely 236-22.74-6.74 -5.86 gram solution; Generic drug: polyethylene   glycol; STARTING AT 6PM DRINK HALF OF THE BOTTLE, DRINK THE OTHER HALF 5   HRS BEFORE PROCEDURE TIME   rosuvastatin 10 mg tablet; Commonly known as: Crestor; Take 1 tablet (10   mg) by mouth once daily.   sodium,potassium,mag sulfates 17.5-3.13-1.6 gram solution; Commonly   known as: Suprep; Take one bottle beginning at 6pm night before procedure   and then take the other bottle 5 hours before procedure time as directed   per instruction sheet       Test Results Pending At Discharge  Pending Labs       Order Current Status    Extra Urine Gray Tube Collected (12/04/24 1242)    Calculi (Stone) Analysis In process    Urinalysis with Reflex Culture and Microscopic In process    Blood Culture Preliminary result    Blood Culture Preliminary result            Hospital Course     Sofya Dean is a 64 y.o. female patient with past medical history of hypothyroidism, hyperlipidemia, seizure disorder, sleep apnea on CPAP presenting with 2-day history of dysuria, nausea and right flank pain.  Patient was found to have an obstructing stone in the distal right ureter, there is also a nonobstructing 3 mm stone in the left ureteropelvic junction.  Patient was started on Cipro for urinary tract infection.  She was seen by urology, IV fluids started, patient will be n.p.o. at midnight for  possible urological intervention if stone not passed.  BUN 25 creatinine 0.78, WBC 11.6.     # Nephrolithiasis  Pain control  Antiemetics  IV hydration  Continue home medications for chronic conditions  S/p Right Cystoscopy with Insertion Stent Ureter   Urine and blood cultures negative      Patient is doing much better today.  She has been cleared for discharge from neurology standpoint.  She will be discharged today in stable condition.  Advised her to follow with her PCP and neurology as outpatient.      Discharge time spent more than 30 minutes.    Pertinent Physical Exam At Time of Discharge  Physical Exam    Constitutional: No acute distress, awake, alert  Head/Neck: Neck supple  Respiratory/Thorax: Lungs are Clear to auscultation  Cardiovascular: Regular, rate and rhythm,  2+ equal pulses of the extremities, normal S 1and S 2  Gastrointestinal: Nondistended, soft, non-tender, no rebound tenderness or guarding  Extremities: No edema. No calf tenderness.  Neurological: Awake and alert. No focal neurological deficits  Psychological: Appropriate mood and behavior    Outpatient Follow-Up  Future Appointments   Date Time Provider Department Center   12/18/2024 11:00 AM [facility] [id number] DXA 1 [id number] [facility]   12/27/2024  8:30 AM UROLOGY [id number] NURSE [id number] [facility]   1/17/2025  [facility]   7/9/2025 10:45 AM DUANE [id number] [facility]   11/18/2025  1:30 PM Avis Odell MD [id number] [facility]         Hayley Gallo MD

## 2024-12-08 LAB
APPEARANCE STONE: NORMAL
BACTERIA BLD CULT: NORMAL
BACTERIA BLD CULT: NORMAL
COMPN STONE: NORMAL
SPECIMEN WT: NORMAL MG

## 2024-12-09 ENCOUNTER — PATIENT OUTREACH (OUTPATIENT)
Dept: PRIMARY CARE | Facility: CLINIC | Age: 64
End: 2024-12-09
Payer: MEDICARE

## 2024-12-09 NOTE — PROGRESS NOTES
Discharge Facility: [facility]  Discharge Diagnosis: Nephrolithiasis   Admission Date: 4 Dec 24  Discharge Date: 6 Dec 24    PCP Appointment Date: Tasked to office  Specialist Appointment Date: 27 Dec 24 (UrologyDeepika)  Hospital Encounter and Summary Linked: Yes    No contact on discharge outreach after 2 attempts. Tasked to office for scheduling. Will attempt outreach again in 2 wks.

## 2024-12-11 ENCOUNTER — APPOINTMENT (OUTPATIENT)
Dept: RADIOLOGY | Facility: CLINIC | Age: 64
End: 2024-12-11
Payer: MEDICARE

## 2024-12-18 ENCOUNTER — HOSPITAL ENCOUNTER (OUTPATIENT)
Dept: RADIOLOGY | Facility: CLINIC | Age: 64
End: 2024-12-18
Payer: MEDICARE

## 2024-12-24 ENCOUNTER — PATIENT OUTREACH (OUTPATIENT)
Dept: PRIMARY CARE | Facility: CLINIC | Age: 64
End: 2024-12-24
Payer: MEDICARE

## 2024-12-27 ENCOUNTER — APPOINTMENT (OUTPATIENT)
Dept: UROLOGY | Facility: CLINIC | Age: 64
End: 2024-12-27
Payer: MEDICARE

## 2024-12-27 VITALS — BODY MASS INDEX: 31.23 KG/M2 | HEIGHT: 67 IN | WEIGHT: 199 LBS | TEMPERATURE: 96.1 F

## 2024-12-27 DIAGNOSIS — Z96.0 URETERAL STENT PRESENT: ICD-10-CM

## 2024-12-27 DIAGNOSIS — N20.0 URINARY TRACT OBSTRUCTION BY KIDNEY STONE: ICD-10-CM

## 2024-12-27 DIAGNOSIS — N13.8 URINARY TRACT OBSTRUCTION BY KIDNEY STONE: ICD-10-CM

## 2024-12-27 DIAGNOSIS — N20.0 NEPHROLITHIASIS: Primary | ICD-10-CM

## 2024-12-27 LAB
POC APPEARANCE, URINE: CLEAR
POC BILIRUBIN, URINE: NEGATIVE
POC BLOOD, URINE: ABNORMAL
POC COLOR, URINE: YELLOW
POC GLUCOSE, URINE: NEGATIVE MG/DL
POC KETONES, URINE: NEGATIVE MG/DL
POC LEUKOCYTES, URINE: ABNORMAL
POC NITRITE,URINE: NEGATIVE
POC PH, URINE: 6 PH
POC PROTEIN, URINE: ABNORMAL MG/DL
POC SPECIFIC GRAVITY, URINE: >=1.03
POC UROBILINOGEN, URINE: 0.2 EU/DL

## 2024-12-27 PROCEDURE — 99213 OFFICE O/P EST LOW 20 MIN: CPT | Performed by: UROLOGY

## 2024-12-27 PROCEDURE — 52310 CYSTOSCOPY AND TREATMENT: CPT | Performed by: UROLOGY

## 2024-12-27 PROCEDURE — 81003 URINALYSIS AUTO W/O SCOPE: CPT | Performed by: UROLOGY

## 2024-12-27 ASSESSMENT — PAIN SCALES - GENERAL: PAINLEVEL_OUTOF10: 0-NO PAIN

## 2024-12-28 NOTE — PROGRESS NOTES
Today's visit:  #nephrolithiasis  -was seen as inpatient  -12/5/24: Right cystoscopy/LL/Stent for 6mm right UVJ stone  -doing well postop    -DC summary reviewed    Ucx negative        Labs  Component      Latest Ref Rng 12/6/2024   WBC      4.4 - 11.3 x10*3/uL 7.3    nRBC      0.0 - 0.0 /100 WBCs 0.0    RBC      4.00 - 5.20 x10*6/uL 4.00    HEMOGLOBIN      12.0 - 16.0 g/dL 12.2    HEMATOCRIT      36.0 - 46.0 % 37.0    MCV      80 - 100 fL 93    MCH      26.0 - 34.0 pg 30.5    MCHC      32.0 - 36.0 g/dL 33.0    RED CELL DISTRIBUTION WIDTH      11.5 - 14.5 % 13.5    Platelets      150 - 450 x10*3/uL 201    GLUCOSE      74 - 99 mg/dL 144 (H)    SODIUM      136 - 145 mmol/L 140    POTASSIUM      3.5 - 5.3 mmol/L 4.0    CHLORIDE      98 - 107 mmol/L 108 (H)    Bicarbonate      21 - 32 mmol/L 26    Anion Gap      10 - 20 mmol/L 10    Blood Urea Nitrogen      6 - 23 mg/dL 19    Creatinine      0.50 - 1.05 mg/dL 0.64    EGFR      >60 mL/min/1.73m*2 >90    Calcium      8.6 - 10.3 mg/dL 9.1       Legend:  (H) High      Medications:    Current Outpatient Medications:     calcium carbonate 600 mg calcium (1,500 mg) tablet, Take 1 tablet (1,500 mg) by mouth once daily., Disp: , Rfl:     divalproex (Depakote ER) 250 mg 24 hr tablet, Take 2 tablets (500 mg) by mouth once daily in the morning AND 1 tablet (250 mg) once daily in the evening. Do not crush, chew, or split.., Disp: 270 tablet, Rfl: 3    fish oil concentrate (Omega-3) 120-180 mg capsule, Take 1 capsule (1 g) by mouth once daily., Disp: , Rfl:     multivitamin tablet, Take 1 tablet by mouth once daily., Disp: , Rfl:     phenazopyridine (Pyridium) 100 mg tablet, Take 1 tablet (100 mg) by mouth 3 times a day as needed for bladder spasms., Disp: 30 tablet, Rfl: 0    polyethylene glycol-electrolytes (Golytely) 420 gram solution, STARTING AT 6PM DRINK HALF OF THE BOTTLE, DRINK THE OTHER HALF 5 HRS BEFORE PROCEDURE TIME (Patient not taking: Reported on 7/3/2024), Disp:  4000 mL, Rfl: 0    rosuvastatin (Crestor) 10 mg tablet, Take 1 tablet (10 mg) by mouth once daily. (Patient not taking: Reported on 12/4/2024), Disp: 100 tablet, Rfl: 3    sodium,potassium,mag sulfates (Suprep) 17.5-3.13-1.6 gram recon soln solution, Take one bottle beginning at 6pm night before procedure and then take the other bottle 5 hours before procedure time as directed per instruction sheet (Patient not taking: Reported on 7/3/2024), Disp: 354 mL, Rfl: 0    Allergy:  Allergies   Allergen Reactions    Cefadroxil Other    Chlorpheniramine-Pseudoephed Hives    Iodine Hives and Swelling        Exam  CONSTITUTIONAL:        No acute distress    HEAD:        Normocephalic and atraumatic    CHEST / RESPIRATORY      no excess work of breathing, no respiratory distress,    ABDOMEN / GASTROINTESTINAL:        Abdomen nondistended          Assessment/Plan  #nephrolithiasis  -s/p RIGHT URS/LL/Stent, and stent removal today  -warning signs reviewed  -KUB and DAVID in 2months  -nephrology referral

## 2025-01-06 ENCOUNTER — PATIENT OUTREACH (OUTPATIENT)
Dept: PRIMARY CARE | Facility: CLINIC | Age: 65
End: 2025-01-06
Payer: MEDICARE

## 2025-01-06 NOTE — PROGRESS NOTES
No contact on third outreach attempt (one month). Message left. TCM program closed     Pt called back. Stating she is still experiencing urinary frequency even after her stent removal procedure. Pt instructed to call Urology department to see if this is something that is to be expected or if she should go to evaluated at urgent care/ed. Pt denies any pain, sob, difficulty with urination, or pain with urination at this time.

## 2025-01-07 ENCOUNTER — APPOINTMENT (OUTPATIENT)
Dept: PRIMARY CARE | Facility: CLINIC | Age: 65
End: 2025-01-07
Payer: MEDICARE

## 2025-01-07 ENCOUNTER — TELEPHONE (OUTPATIENT)
Dept: PRIMARY CARE | Facility: CLINIC | Age: 65
End: 2025-01-07

## 2025-01-07 ENCOUNTER — TELEPHONE (OUTPATIENT)
Dept: UROLOGY | Facility: CLINIC | Age: 65
End: 2025-01-07

## 2025-01-07 DIAGNOSIS — R39.9 UTI SYMPTOMS: ICD-10-CM

## 2025-01-07 NOTE — TELEPHONE ENCOUNTER
Patient called nurse line reporting UTI symptoms including burning, urgency, frequency. Dr. Poe. New orders received from Dr. Poe for urine testing. Call returned to patient to notify of new orders. Patient voices understanding. Information on closest lab given to patient.

## 2025-01-08 ENCOUNTER — TELEPHONE (OUTPATIENT)
Dept: UROLOGY | Facility: CLINIC | Age: 65
End: 2025-01-08

## 2025-01-08 ENCOUNTER — LAB (OUTPATIENT)
Dept: LAB | Facility: LAB | Age: 65
End: 2025-01-08
Payer: MEDICARE

## 2025-01-08 DIAGNOSIS — R39.9 UTI SYMPTOMS: ICD-10-CM

## 2025-01-08 LAB
APPEARANCE UR: CLEAR
BILIRUB UR STRIP.AUTO-MCNC: NEGATIVE MG/DL
COLOR UR: YELLOW
GLUCOSE UR STRIP.AUTO-MCNC: NORMAL MG/DL
KETONES UR STRIP.AUTO-MCNC: NEGATIVE MG/DL
LEUKOCYTE ESTERASE UR QL STRIP.AUTO: ABNORMAL
MUCOUS THREADS #/AREA URNS AUTO: ABNORMAL /LPF
NITRITE UR QL STRIP.AUTO: NEGATIVE
PH UR STRIP.AUTO: 5.5 [PH]
PROT UR STRIP.AUTO-MCNC: NEGATIVE MG/DL
RBC # UR STRIP.AUTO: ABNORMAL /UL
RBC #/AREA URNS AUTO: >20 /HPF
SP GR UR STRIP.AUTO: 1.02
SQUAMOUS #/AREA URNS AUTO: ABNORMAL /HPF
UROBILINOGEN UR STRIP.AUTO-MCNC: NORMAL MG/DL
WBC #/AREA URNS AUTO: ABNORMAL /HPF

## 2025-01-08 PROCEDURE — 81001 URINALYSIS AUTO W/SCOPE: CPT

## 2025-01-08 PROCEDURE — 87086 URINE CULTURE/COLONY COUNT: CPT

## 2025-01-08 NOTE — TELEPHONE ENCOUNTER
Notified patient per Dr. Poe, based on current lab/urine results no antibiotics or meds would be ordered at this time, but urine culture is still pending. Patient voices understanding.

## 2025-01-09 LAB — BACTERIA UR CULT: NORMAL

## 2025-01-14 ENCOUNTER — TELEPHONE (OUTPATIENT)
Dept: UROLOGY | Facility: CLINIC | Age: 65
End: 2025-01-14
Payer: MEDICARE

## 2025-01-14 NOTE — TELEPHONE ENCOUNTER
Patient left VM on nurse line requesting results of urine culture ordered by Dr. Poe. Call returned, no answer, left detailed VM.

## 2025-01-15 ENCOUNTER — HOSPITAL ENCOUNTER (OUTPATIENT)
Dept: RADIOLOGY | Facility: CLINIC | Age: 65
Discharge: HOME | End: 2025-01-15
Payer: MEDICARE

## 2025-01-15 DIAGNOSIS — M81.0 AGE-RELATED OSTEOPOROSIS WITHOUT CURRENT PATHOLOGICAL FRACTURE: ICD-10-CM

## 2025-01-15 PROCEDURE — 77080 DXA BONE DENSITY AXIAL: CPT | Performed by: RADIOLOGY

## 2025-01-15 PROCEDURE — 77080 DXA BONE DENSITY AXIAL: CPT

## 2025-01-16 NOTE — RESULT ENCOUNTER NOTE
"Bone density does look normal in the spine, there is osteopenia or \"low bone mass\" of the left hip.  I recommend dietary calcium, vitamin D supplement and weightbearing exercise.  The next bone density test is due in 2 years."

## 2025-01-17 ENCOUNTER — APPOINTMENT (OUTPATIENT)
Dept: RADIOLOGY | Facility: CLINIC | Age: 65
End: 2025-01-17
Payer: MEDICARE

## 2025-01-17 DIAGNOSIS — M81.0 AGE-RELATED OSTEOPOROSIS WITHOUT CURRENT PATHOLOGICAL FRACTURE: Primary | ICD-10-CM

## 2025-01-21 ENCOUNTER — APPOINTMENT (OUTPATIENT)
Dept: PRIMARY CARE | Facility: CLINIC | Age: 65
End: 2025-01-21
Payer: MEDICARE

## 2025-01-21 ENCOUNTER — LAB (OUTPATIENT)
Dept: LAB | Facility: LAB | Age: 65
End: 2025-01-21
Payer: MEDICARE

## 2025-01-21 VITALS
WEIGHT: 200 LBS | BODY MASS INDEX: 31.39 KG/M2 | HEIGHT: 67 IN | HEART RATE: 80 BPM | OXYGEN SATURATION: 97 % | SYSTOLIC BLOOD PRESSURE: 125 MMHG | DIASTOLIC BLOOD PRESSURE: 75 MMHG

## 2025-01-21 DIAGNOSIS — N20.0 NEPHROLITHIASIS: Primary | ICD-10-CM

## 2025-01-21 DIAGNOSIS — M81.0 AGE-RELATED OSTEOPOROSIS WITHOUT CURRENT PATHOLOGICAL FRACTURE: ICD-10-CM

## 2025-01-21 DIAGNOSIS — R73.9 HYPERGLYCEMIA: ICD-10-CM

## 2025-01-21 DIAGNOSIS — G40.909 SEIZURE DISORDER (MULTI): ICD-10-CM

## 2025-01-21 PROCEDURE — 99214 OFFICE O/P EST MOD 30 MIN: CPT | Performed by: PHYSICIAN ASSISTANT

## 2025-01-21 PROCEDURE — 1160F RVW MEDS BY RX/DR IN RCRD: CPT | Performed by: PHYSICIAN ASSISTANT

## 2025-01-21 PROCEDURE — 82306 VITAMIN D 25 HYDROXY: CPT

## 2025-01-21 PROCEDURE — 80048 BASIC METABOLIC PNL TOTAL CA: CPT

## 2025-01-21 PROCEDURE — 3008F BODY MASS INDEX DOCD: CPT | Performed by: PHYSICIAN ASSISTANT

## 2025-01-21 PROCEDURE — 83036 HEMOGLOBIN GLYCOSYLATED A1C: CPT

## 2025-01-21 PROCEDURE — 1036F TOBACCO NON-USER: CPT | Performed by: PHYSICIAN ASSISTANT

## 2025-01-21 PROCEDURE — 1159F MED LIST DOCD IN RCRD: CPT | Performed by: PHYSICIAN ASSISTANT

## 2025-01-21 ASSESSMENT — PATIENT HEALTH QUESTIONNAIRE - PHQ9
2. FEELING DOWN, DEPRESSED OR HOPELESS: NOT AT ALL
1. LITTLE INTEREST OR PLEASURE IN DOING THINGS: NOT AT ALL
SUM OF ALL RESPONSES TO PHQ9 QUESTIONS 1 AND 2: 0

## 2025-01-21 ASSESSMENT — ENCOUNTER SYMPTOMS
DIZZINESS: 0
MUSCULOSKELETAL NEGATIVE: 1
COUGH: 0
ABDOMINAL PAIN: 0
NERVOUS/ANXIOUS: 0
VOMITING: 0
DEPRESSION: 0
ALLERGIC/IMMUNOLOGIC NEGATIVE: 1
OCCASIONAL FEELINGS OF UNSTEADINESS: 0
CARDIOVASCULAR NEGATIVE: 1
HEADACHES: 0
PSYCHIATRIC NEGATIVE: 1
NAUSEA: 0
PALPITATIONS: 0
HEMATOLOGIC/LYMPHATIC NEGATIVE: 1
LOSS OF SENSATION IN FEET: 0
WHEEZING: 0
RESPIRATORY NEGATIVE: 1
EYES NEGATIVE: 1
BACK PAIN: 0
GASTROINTESTINAL NEGATIVE: 1
CONSTITUTIONAL NEGATIVE: 1
ENDOCRINE NEGATIVE: 1
SHORTNESS OF BREATH: 0
NEUROLOGICAL NEGATIVE: 1
ARTHRALGIAS: 0

## 2025-01-21 NOTE — PROGRESS NOTES
"Subjective   Patient ID: Sofya Dean is a 64 y.o. female who presents for Follow-up (Hospital follow up ).    HPI     Patient Sofya Dean 64 y.o. female is here today for hospital follow up     Kidney stones  had stent and removed-- normal urine and bowels --   Seizure d/o- depakote per neurology   Had repeat UA 1/8-- some residual blood -hematuria and buring at times -- now resolved   Slight nausea now for a couple weeks and now resolved the past week   patient states feeling well otherwise  denies fever, chills, N/V, headache, dizziness, CP, SOB, palpitations, edema, numbness, tingling, weakness  A chaperone was offered to the patient for the physical exam /  exam and was declined       Review of Systems   Constitutional: Negative.    HENT: Negative.     Eyes: Negative.    Respiratory: Negative.  Negative for cough, shortness of breath and wheezing.    Cardiovascular: Negative.  Negative for chest pain and palpitations.   Gastrointestinal: Negative.  Negative for abdominal pain, nausea and vomiting.   Endocrine: Negative.    Genitourinary: Negative.    Musculoskeletal: Negative.  Negative for arthralgias and back pain.   Skin: Negative.  Negative for rash.   Allergic/Immunologic: Negative.    Neurological: Negative.  Negative for dizziness and headaches.   Hematological: Negative.    Psychiatric/Behavioral: Negative.  The patient is not nervous/anxious.        Objective   /75 (BP Location: Right arm, Patient Position: Sitting)   Pulse 80   Ht 1.702 m (5' 7\")   Wt 90.7 kg (200 lb)   SpO2 97%   BMI 31.32 kg/m²     Physical Exam  Vitals and nursing note reviewed.   Constitutional:       Appearance: Normal appearance.   HENT:      Head: Normocephalic and atraumatic.   Cardiovascular:      Rate and Rhythm: Normal rate and regular rhythm.   Pulmonary:      Effort: Pulmonary effort is normal.      Breath sounds: Normal breath sounds.   Abdominal:      General: Bowel sounds are normal.      " Palpations: Abdomen is soft.   Musculoskeletal:         General: Normal range of motion.      Cervical back: Normal range of motion.   Skin:     General: Skin is warm and dry.   Neurological:      General: No focal deficit present.      Mental Status: She is alert and oriented to person, place, and time.   Psychiatric:         Mood and Affect: Mood normal.         Behavior: Behavior normal.         Thought Content: Thought content normal.         Judgment: Judgment normal.         Assessment/Plan   Problem List Items Addressed This Visit       Seizure disorder (Multi)    Nephrolithiasis - Primary     Other Visit Diagnoses       Hyperglycemia        Relevant Medications    semaglutide 0.25 mg or 0.5 mg (2 mg/3 mL) pen injector    Other Relevant Orders    Hemoglobin A1C    Basic Metabolic Panel          Hyperglycemia  - Begin Semaglutide 0.25 mg   - Advised to maintain healthy diet and exercise  - Risks and benefits of semaglutide discussed with the patient   - If pt starts to experience any severe or bothersome side effects she understands to call the office  - All questions answered. Pt understands treatment plan.     Back Pain w/ Sciatica   - Advised to do stretches and exercise   - Advised to continue OTC Tylenol to help with pain discomfort   - Suggested PT - Pt declined. FU if pain worsens   - All questions answered. Pt understands treatment plan.     Nephrolithiasis   - Continue drinking fluids,   - Continue w/ Pyridium as needed.   - FU with the office or ED if any sx worsen   - All questions answered. Pt understands treatment plan.     Seizure Disorder  - Continue taking medications as prescribed   - All patients questions answered. Pt understands treatment plan.       ,complexity visit of 35+  minutes face-to-face with at least half of the time discussing  Results of my examination, clinical findings, impression and diagnoses discussed with the patient as well as results of the latest test/lab work. The  patient was in agreement with the plan and verbalized a good understanding of instructions and plans for treatment. At the end of the visit today, the patient felt that all questions had been answered satisfactorily. The patient was pleased with the visit and very appreciative for the care rendered.

## 2025-01-22 LAB
25(OH)D3 SERPL-MCNC: 52 NG/ML (ref 30–100)
ANION GAP SERPL CALC-SCNC: 15 MMOL/L (ref 10–20)
BUN SERPL-MCNC: 20 MG/DL (ref 6–23)
CALCIUM SERPL-MCNC: 10.6 MG/DL (ref 8.6–10.6)
CHLORIDE SERPL-SCNC: 101 MMOL/L (ref 98–107)
CO2 SERPL-SCNC: 29 MMOL/L (ref 21–32)
CREAT SERPL-MCNC: 0.81 MG/DL (ref 0.5–1.05)
EGFRCR SERPLBLD CKD-EPI 2021: 81 ML/MIN/1.73M*2
EST. AVERAGE GLUCOSE BLD GHB EST-MCNC: 105 MG/DL
GLUCOSE SERPL-MCNC: 89 MG/DL (ref 74–99)
HBA1C MFR BLD: 5.3 %
POTASSIUM SERPL-SCNC: 4.7 MMOL/L (ref 3.5–5.3)
SODIUM SERPL-SCNC: 140 MMOL/L (ref 136–145)

## 2025-02-04 ENCOUNTER — HOSPITAL ENCOUNTER (OUTPATIENT)
Dept: RADIOLOGY | Facility: HOSPITAL | Age: 65
Discharge: HOME | End: 2025-02-04
Payer: MEDICARE

## 2025-02-04 ENCOUNTER — APPOINTMENT (OUTPATIENT)
Dept: RADIOLOGY | Facility: HOSPITAL | Age: 65
End: 2025-02-04
Payer: MEDICARE

## 2025-02-04 DIAGNOSIS — N20.0 NEPHROLITHIASIS: ICD-10-CM

## 2025-02-04 PROCEDURE — 74018 RADEX ABDOMEN 1 VIEW: CPT

## 2025-02-04 PROCEDURE — 76770 US EXAM ABDO BACK WALL COMP: CPT | Performed by: RADIOLOGY

## 2025-02-04 PROCEDURE — 76770 US EXAM ABDO BACK WALL COMP: CPT

## 2025-02-07 DIAGNOSIS — N20.0 NEPHROLITHIASIS: ICD-10-CM

## 2025-02-10 ENCOUNTER — TELEPHONE (OUTPATIENT)
Dept: UROLOGY | Facility: HOSPITAL | Age: 65
End: 2025-02-10
Payer: MEDICARE

## 2025-02-10 ENCOUNTER — HOSPITAL ENCOUNTER (OUTPATIENT)
Dept: RADIOLOGY | Facility: CLINIC | Age: 65
Discharge: HOME | End: 2025-02-10
Payer: MEDICARE

## 2025-02-10 DIAGNOSIS — Z12.31 ENCOUNTER FOR SCREENING MAMMOGRAM FOR MALIGNANT NEOPLASM OF BREAST: ICD-10-CM

## 2025-02-10 DIAGNOSIS — Z01.419 ENCOUNTER FOR GYNECOLOGICAL EXAMINATION WITHOUT ABNORMAL FINDING: ICD-10-CM

## 2025-02-10 PROCEDURE — 77063 BREAST TOMOSYNTHESIS BI: CPT

## 2025-02-10 PROCEDURE — 77067 SCR MAMMO BI INCL CAD: CPT | Performed by: RADIOLOGY

## 2025-02-10 PROCEDURE — 77063 BREAST TOMOSYNTHESIS BI: CPT | Performed by: RADIOLOGY

## 2025-02-10 NOTE — TELEPHONE ENCOUNTER
Call placed out to patient to notify of new orders for CT placed per Dr. Poe. Patient has scheduling line number to schedule CT. Will return call for further questions.

## 2025-02-11 ENCOUNTER — HOSPITAL ENCOUNTER (OUTPATIENT)
Dept: RADIOLOGY | Facility: HOSPITAL | Age: 65
Discharge: HOME | End: 2025-02-11
Payer: MEDICARE

## 2025-02-11 DIAGNOSIS — N20.0 NEPHROLITHIASIS: ICD-10-CM

## 2025-02-11 PROCEDURE — 74176 CT ABD & PELVIS W/O CONTRAST: CPT

## 2025-02-25 ENCOUNTER — DOCUMENTATION (OUTPATIENT)
Dept: UROLOGY | Facility: CLINIC | Age: 65
End: 2025-02-25

## 2025-02-25 ENCOUNTER — APPOINTMENT (OUTPATIENT)
Dept: UROLOGY | Facility: CLINIC | Age: 65
End: 2025-02-25
Payer: MEDICARE

## 2025-02-25 VITALS — TEMPERATURE: 96.6 F

## 2025-02-25 DIAGNOSIS — N20.0 NEPHROLITHIASIS: ICD-10-CM

## 2025-02-25 PROCEDURE — 1126F AMNT PAIN NOTED NONE PRSNT: CPT | Performed by: UROLOGY

## 2025-02-25 PROCEDURE — 99214 OFFICE O/P EST MOD 30 MIN: CPT | Performed by: UROLOGY

## 2025-02-25 PROCEDURE — 1159F MED LIST DOCD IN RCRD: CPT | Performed by: UROLOGY

## 2025-02-25 PROCEDURE — G2211 COMPLEX E/M VISIT ADD ON: HCPCS | Performed by: UROLOGY

## 2025-02-25 PROCEDURE — 1036F TOBACCO NON-USER: CPT | Performed by: UROLOGY

## 2025-02-25 ASSESSMENT — PAIN SCALES - GENERAL: PAINLEVEL_OUTOF10: 0-NO PAIN

## 2025-02-25 NOTE — PATIENT INSTRUCTIONS
To schedule CT- call [phone]  Stop at any /[facility] lab for bloodwork  Follow up after

## 2025-02-25 NOTE — PROGRESS NOTES
Last Visit: 12/27/24  #nephrolithiasis  -s/p RIGHT URS/LL/Stent, and stent removal today  -warning signs reviewed  -KUB and DAVID in 2months  -nephrology referral    Today's visit:  #nephrolithiasis  -12/5/24: Right cystoscopy/LL/Stent for 6mm right UVJ stone  -doing well postop  -dropped stone on left (4mm stone at UVJ)  -has occasional back pain  -having burning with urination, sometimes severe       CT abd pelvis, 2/11/25, personally reviewed/interpreted:  IMPRESSION:  1. Left ureterolithiasis with 4 mm stone near the left ureterovesical junction as described above with mild hydroureter and hydronephrosis 2. Nonobstructing nephrolithiasis left kidney 3. Scattered uncomplicated colonic diverticula.    Renal US, 2/4/25, personally reviewed/interpreted:  IMPRESSION:  Mild left-sided hydronephrosis of uncertain etiology.    KUB, 2/4/25:  IMPRESSION:  Nonspecific bowel-gas pattern. No unusual calcific densities are detected though the left kidney is  obscured by bowel gas and fecal debris.    Labs  [facility], 1/21/25:  Component      Latest Ref Rng 1/21/2025   GLUCOSE      74 - 99 mg/dL 89    SODIUM      136 - 145 mmol/L 140    POTASSIUM      3.5 - 5.3 mmol/L 4.7    CHLORIDE      98 - 107 mmol/L 101    Bicarbonate      21 - 32 mmol/L 29    Anion Gap      10 - 20 mmol/L 15    Blood Urea Nitrogen      6 - 23 mg/dL 20    Creatinine      0.50 - 1.05 mg/dL 0.81    Calcium      8.6 - 10.6 mg/dL 10.6    EGFR      >60 mL/min/1.73m*2 81          Medications:    Current Outpatient Medications:     calcium carbonate 600 mg calcium (1,500 mg) tablet, Take 1 tablet (1,500 mg) by mouth once daily., Disp: , Rfl:     divalproex (Depakote ER) 250 mg 24 hr tablet, Take 2 tablets (500 mg) by mouth once daily in the morning AND 1 tablet (250 mg) once daily in the evening. Do not crush, chew, or split.., Disp: 270 tablet, Rfl: 3    fish oil concentrate (Omega-3) 120-180 mg capsule, Take 1 capsule (1 g) by mouth once daily., Disp: , Rfl:      multivitamin tablet, Take 1 tablet by mouth once daily., Disp: , Rfl:     phenazopyridine (Pyridium) 100 mg tablet, Take 1 tablet (100 mg) by mouth 3 times a day as needed for bladder spasms., Disp: 30 tablet, Rfl: 0    rosuvastatin (Crestor) 10 mg tablet, Take 1 tablet (10 mg) by mouth once daily. (Patient not taking: Reported on 12/4/2024), Disp: 100 tablet, Rfl: 3    semaglutide 0.25 mg or 0.5 mg (2 mg/3 mL) pen injector, Inject 0.25 mg under the skin 1 (one) time per week., Disp: 3 mL, Rfl: 2    Allergy:  Allergies   Allergen Reactions    Cefadroxil Other    Chlorpheniramine-Pseudoephed Hives    Iodine Hives and Swelling        Exam  CONSTITUTIONAL:        No acute distress    HEAD:        Normocephalic and atraumatic    CHEST / RESPIRATORY      no excess work of breathing, no respiratory distress,    ABDOMEN / GASTROINTESTINAL:        Abdomen nondistended        Assessment/Plan  #nephrolithiasis  -s/p RIGHT URS/LL/Stent, and stent removal   - Now with left UVJ 4mm stone  -UCX    Given size of stone, can continue with medical expulsive therapy. If stone does not pass in 6 weeks, will consider intervention.   -encourage hydration   -strain urine   -pain control prn   -Discussed warning signs to present to ED, including fevers/chills/nausea/vomiting or intractable pain   -will repeat CT in 6 wks     Fu with CT results    G 2211  Visit complexity inherent to evaluation and management (E&M) associated with medical care services that serve as the continuing focal point for all needed health care services and/or with medical care services that are part of ongoing care related to a patient's single, serious condition or a complex condition.

## 2025-02-25 NOTE — PROGRESS NOTES
Last Visit: 12/27/24  #nephrolithiasis  -s/p RIGHT URS/LL/Stent, and stent removal today  -warning signs reviewed  -KUB and DAVID in 2months  -nephrology referral     Today's visit:  #nephrolithiasis  -12/5/24: Right cystoscopy/LL/Stent for 6mm right UVJ stone  -dropped stone on LEFT (4mm stone at UVJ)  -has occasional back pain  -having burning with urination  -no fevers/chills        CT abd pelvis, 2/11/25, personally reviewed/interpreted:  IMPRESSION:  1. Left ureterolithiasis with 4 mm stone near the left ureterovesical junction as described above with mild hydroureter and hydronephrosis 2. Nonobstructing nephrolithiasis left kidney 3. Scattered uncomplicated colonic diverticula.     Renal US, 2/4/25, personally reviewed/interpreted:  IMPRESSION:  Mild left-sided hydronephrosis of uncertain etiology.     KUB, 2/4/25: - reviewed/interpreted  IMPRESSION:  Nonspecific bowel-gas pattern. No unusual calcific densities are detected though the left kidney is  obscured by bowel gas and fecal debris.     Labs  [facility], 1/21/25:  Component      Latest Ref Rng 1/21/2025   GLUCOSE      74 - 99 mg/dL 89    SODIUM      136 - 145 mmol/L 140    POTASSIUM      3.5 - 5.3 mmol/L 4.7    CHLORIDE      98 - 107 mmol/L 101    Bicarbonate      21 - 32 mmol/L 29    Anion Gap      10 - 20 mmol/L 15    Blood Urea Nitrogen      6 - 23 mg/dL 20    Creatinine      0.50 - 1.05 mg/dL 0.81    Calcium      8.6 - 10.6 mg/dL 10.6    EGFR      >60 mL/min/1.73m*2 81            Medications:    Current Medications      Current Outpatient Medications:     calcium carbonate 600 mg calcium (1,500 mg) tablet, Take 1 tablet (1,500 mg) by mouth once daily., Disp: , Rfl:     divalproex (Depakote ER) 250 mg 24 hr tablet, Take 2 tablets (500 mg) by mouth once daily in the morning AND 1 tablet (250 mg) once daily in the evening. Do not crush, chew, or split.., Disp: 270 tablet, Rfl: 3    fish oil concentrate (Omega-3) 120-180 mg capsule, Take 1 capsule (1 g) by mouth  once daily., Disp: , Rfl:     multivitamin tablet, Take 1 tablet by mouth once daily., Disp: , Rfl:     phenazopyridine (Pyridium) 100 mg tablet, Take 1 tablet (100 mg) by mouth 3 times a day as needed for bladder spasms., Disp: 30 tablet, Rfl: 0    rosuvastatin (Crestor) 10 mg tablet, Take 1 tablet (10 mg) by mouth once daily. (Patient not taking: Reported on 12/4/2024), Disp: 100 tablet, Rfl: 3    semaglutide 0.25 mg or 0.5 mg (2 mg/3 mL) pen injector, Inject 0.25 mg under the skin 1 (one) time per week., Disp: 3 mL, Rfl: 2        Allergy:  Allergies        Allergies   Allergen Reactions    Cefadroxil Other    Chlorpheniramine-Pseudoephed Hives    Iodine Hives and Swelling            Exam  CONSTITUTIONAL:        No acute distress    HEAD:        Normocephalic and atraumatic    CHEST / RESPIRATORY      no excess work of breathing, no respiratory distress,    ABDOMEN / GASTROINTESTINAL:        Abdomen nondistended          Assessment/Plan  #nephrolithiasis  -s/p RIGHT URS/LL/Stent, and stent removal   - Now with left UVJ 4mm stone  -UCX  -nephrology referral       Given size of stone, can continue with medical expulsive therapy. If stone does not pass in 6 weeks, will consider intervention.   -encourage hydration   -pain control prn   -Discussed warning signs to present to ED, including fevers/chills/nausea/vomiting or intractable pain   -will repeat CT in 4 wks      Fu with CT results     G 2211  Visit complexity inherent to evaluation and management (E&M) associated with medical care services that serve as the continuing focal point for all needed health care services and/or with medical care services that are part of ongoing care related to a patient's single, serious condition or a complex condition.

## 2025-02-27 ENCOUNTER — APPOINTMENT (OUTPATIENT)
Facility: CLINIC | Age: 65
End: 2025-02-27
Payer: MEDICARE

## 2025-02-27 VITALS
RESPIRATION RATE: 18 BRPM | WEIGHT: 207.67 LBS | SYSTOLIC BLOOD PRESSURE: 138 MMHG | HEIGHT: 67 IN | DIASTOLIC BLOOD PRESSURE: 84 MMHG | OXYGEN SATURATION: 96 % | HEART RATE: 85 BPM | BODY MASS INDEX: 32.6 KG/M2

## 2025-02-27 DIAGNOSIS — N20.0 NEPHROLITHIASIS: Primary | ICD-10-CM

## 2025-02-27 LAB — BACTERIA UR CULT: NORMAL

## 2025-02-27 PROCEDURE — 1159F MED LIST DOCD IN RCRD: CPT | Performed by: INTERNAL MEDICINE

## 2025-02-27 PROCEDURE — 1126F AMNT PAIN NOTED NONE PRSNT: CPT | Performed by: INTERNAL MEDICINE

## 2025-02-27 PROCEDURE — 3008F BODY MASS INDEX DOCD: CPT | Performed by: INTERNAL MEDICINE

## 2025-02-27 PROCEDURE — 1036F TOBACCO NON-USER: CPT | Performed by: INTERNAL MEDICINE

## 2025-02-27 PROCEDURE — 99204 OFFICE O/P NEW MOD 45 MIN: CPT | Performed by: INTERNAL MEDICINE

## 2025-02-27 ASSESSMENT — PAIN SCALES - GENERAL: PAINLEVEL_OUTOF10: 0-NO PAIN

## 2025-02-27 NOTE — PROGRESS NOTES
Nephrology Outpatient New Patient Visit Note    Chief Concern:  Lithiasis     History Of Present Illness  Sofya Dean is a 64 y.o. female with a history of nephrolithiasis, T2D, SZ disorder  -12/5/24: Right cystoscopy/LL/Stent for 6mm right UVJ stone, stent removed on 2/25/25  Scr 0.8  Serum Ca ~10-11, K/bicarb OK, taking Ca carbonate 1200 mg every day, . Vit D 52, UA pH 5.5, OTC vit D 1000u every day   Ca oxalate stone  No PTH, urine 24h seen  Drinks tea with turmeric  Drinks >2L fluids a day and watch her salt intake, eats meat regularly     CT Feb 2025:  Right kidney demonstrates no hydronephrosis or nephrolithiasis.  Visualized course of the right ureter is unremarkable.  Left kidney demonstrates nonobstructing nephrolithiasis with a few  punctate 2-3 mm stones in the inferior pole. There is mild  hydronephrosis and hydroureter with 4 mm stone in the distal left  ureter 1 cm from the ureterovesical junction. Minimal stranding of  the fat along left lateral pelvic wall    Past Medical History  She has a past medical history of Abnormal Pap smear of cervix, Difficult intubation (03/04/2024), Epilepsy, Hyperlipidemia, Hypothyroidism, Nephrolithiasis, GLENIS (obstructive sleep apnea), and Varicella.  Patient Active Problem List   Diagnosis    Hyperlipidemia    Hypothyroidism    Ichthyosis vulgaris    Melanocytic nevi of other parts of face    Hemangioma of skin and subcutaneous tissue    Actinic keratosis    Other hypertrophic disorders of the skin    Seizure disorder (Multi)    Sleep apnea    Tubular adenoma of colon    Vitamin D deficiency    Nephrolithiasis    Urinary tract obstruction by kidney stone    Ureteral stent present       Surgical History  She has a past surgical history that includes Colonoscopy w/ polypectomy; Eye surgery; Cosmetic surgery; Endometrial ablation; and Cervical biopsy w/ loop electrode excision.     Social History  She reports that she has never smoked. She has never used smokeless  "tobacco. She reports current alcohol use. She reports that she does not use drugs.    Family History  Family History   Problem Relation Name Age of Onset    Cancer Mother Kinjal Sarmiento     Hypertension Mother Kinjal Sarmiento     Cancer Father Lane Sarmiento     Kidney disease Father Lane Sarmiento         Allergies  Cefadroxil, Chlorpheniramine-pseudoephed, and Iodine    Review of Systems  A 12-point review of systems was performed and noted be negative except for that which was mentioned in the history of present illness     Last Recorded Vitals  /84 (BP Location: Left arm, Patient Position: Sitting, BP Cuff Size: Large adult)   Pulse 85   Resp 18   Ht 1.702 m (5' 7\")   Wt 94.2 kg (207 lb 10.8 oz)   SpO2 96%   BMI 32.53 kg/m²      Physical Exam:  Constitutional:  No acute distress  Respiration:  Breathing comfortably, no stridor  Cardiovascular:  No clubbing/cyanosis/edema in hands  Eyes:  EOM intact, sclera normal  Neuro:  Alert and oriented times 3, Cranial nerves II-XII grossly intact and symmetric bilaterally. No asterixis  Head and Face:  Symmetric facial features, no masses or lesions, sinuses non-tender to palpation  Neck/Lymph:  No LAD, no thyroid masses, trachea midline  Skin:  skin is without visible rash  Psych:  Alert and oriented with appropriate mood and affect      Medications:  Medication Documentation Review Audit       Reviewed by Rosi Moss MA (Medical Assistant) on 02/25/25 at 1336      Medication Order Taking? Sig Documenting Provider Last Dose Status   calcium carbonate 600 mg calcium (1,500 mg) tablet [id number] No Take 1 tablet (1,500 mg) by mouth once daily. Historical Provider, MD Unknown Active   divalproex (Depakote ER) 250 mg 24 hr tablet [id number] No Take 2 tablets (500 mg) by mouth once daily in the morning AND 1 tablet (250 mg) once daily in the evening. Do not crush, chew, or split.. Nataliya Mendes, JOURDAN-CNP 12/4/2024 Active   fish oil concentrate (Omega-3) 120-180 mg capsule " "[id number] No Take 1 capsule (1 g) by mouth once daily. Historical Provider, MD Unknown Active   multivitamin tablet [id number] No Take 1 tablet by mouth once daily. Historical Provider, MD Unknown Active   phenazopyridine (Pyridium) 100 mg tablet [id number]  Take 1 tablet (100 mg) by mouth 3 times a day as needed for bladder spasms. Hayley Gallo MD  Active   rosuvastatin (Crestor) 10 mg tablet [id number] No Take 1 tablet (10 mg) by mouth once daily.   Patient not taking: Reported on 12/4/2024    Princess Aguiar PA-C Not Taking Active   semaglutide 0.25 mg or 0.5 mg (2 mg/3 mL) pen injector [id number]  Inject 0.25 mg under the skin 1 (one) time per week. Princess Aguiar PA-C  Active                    Relevant Results Recent labs reviewed in the EMR.  Lab Results   Component Value Date    HGB 12.2 12/06/2024    HGB 11.2 (L) 12/05/2024    HGB 13.5 12/04/2024    MCV 93 12/06/2024    MCV 93 12/05/2024    MCV 93 12/04/2024     12/06/2024     12/05/2024     12/04/2024       No results found for: \"FERRITIN\", \"IRON\"    Lab Results   Component Value Date     01/21/2025    K 4.7 01/21/2025     01/21/2025    BUN 20 01/21/2025    CREATININE 0.81 01/21/2025         Imaging:  I personally reviewed and this is my impression:        Assessment/Plan   1. Nephrolithiasis (Primary)  - Ca oxalate stone, first time, bilateral burden. Will get metabolic study, needs to stop taking Ca supplement, maintain fluid intake 2.5L/d, Na intake <2.3g/day, less meat and more veggies/fruits. Will get PTH, vitamin Ds, 24h urine before next appt in ~2 months   - Referral to Nephrology  - Sodium, 24 Hour Urine; Future  - Creatinine, 24 Hour Urine; Future  - Calcium, 24 Hour Urine; Future  - Parathyroid Hormone, Intact; Future  - Renal Function Panel; Future  - Vitamin D 1,25 Dihydroxy (for eval of hypercalcemia); Future  - Supersaturation Profile, 24 Hour Urine; Future  - Citrate, Urine; Future  - Uric Acid, " 24 Hour Urine; Future  - Oxalate, Urine, 24 Hour; Future  - MAGNESIUM,  24 HOUR URINE; Future  - Follow Up In Nephrology; Future  - Sodium, 24 Hour Urine  - Creatinine, 24 Hour Urine  - Calcium, 24 Hour Urine  - Parathyroid Hormone, Intact  - Renal Function Panel  - Vitamin D 1,25 Dihydroxy (for eval of hypercalcemia)  - Supersaturation Profile, 24 Hour Urine  - Uric Acid, 24 Hour Urine  - Oxalate, Urine, 24 Hour  - MAGNESIUM,  24 HOUR URINE       Nikolas Quinonez MD  Division of Nephrology and Hypertension

## 2025-03-11 ENCOUNTER — APPOINTMENT (OUTPATIENT)
Dept: RADIOLOGY | Facility: CLINIC | Age: 65
End: 2025-03-11
Payer: MEDICARE

## 2025-03-25 ENCOUNTER — APPOINTMENT (OUTPATIENT)
Dept: PRIMARY CARE | Facility: CLINIC | Age: 65
End: 2025-03-25
Payer: MEDICARE

## 2025-03-25 VITALS
HEIGHT: 67 IN | WEIGHT: 208.34 LBS | DIASTOLIC BLOOD PRESSURE: 83 MMHG | HEART RATE: 87 BPM | SYSTOLIC BLOOD PRESSURE: 132 MMHG | BODY MASS INDEX: 32.7 KG/M2 | OXYGEN SATURATION: 98 %

## 2025-03-25 DIAGNOSIS — R35.0 FREQUENCY OF URINATION: Primary | ICD-10-CM

## 2025-03-25 DIAGNOSIS — N20.0 NEPHROLITHIASIS: ICD-10-CM

## 2025-03-25 DIAGNOSIS — L72.3 SEBACEOUS CYST: ICD-10-CM

## 2025-03-25 DIAGNOSIS — R39.15 URGENCY OF URINATION: ICD-10-CM

## 2025-03-25 DIAGNOSIS — R30.0 DYSURIA: ICD-10-CM

## 2025-03-25 LAB
POC APPEARANCE, URINE: CLEAR
POC BILIRUBIN, URINE: NEGATIVE
POC BLOOD, URINE: NEGATIVE
POC COLOR, URINE: YELLOW
POC GLUCOSE, URINE: NEGATIVE MG/DL
POC KETONES, URINE: NEGATIVE MG/DL
POC LEUKOCYTES, URINE: ABNORMAL
POC NITRITE,URINE: NEGATIVE
POC PH, URINE: 7 PH
POC PROTEIN, URINE: NEGATIVE MG/DL
POC SPECIFIC GRAVITY, URINE: 1.02
POC UROBILINOGEN, URINE: 0.2 EU/DL

## 2025-03-25 PROCEDURE — 1036F TOBACCO NON-USER: CPT | Performed by: PHYSICIAN ASSISTANT

## 2025-03-25 PROCEDURE — 99214 OFFICE O/P EST MOD 30 MIN: CPT | Performed by: PHYSICIAN ASSISTANT

## 2025-03-25 PROCEDURE — 81003 URINALYSIS AUTO W/O SCOPE: CPT | Performed by: PHYSICIAN ASSISTANT

## 2025-03-25 PROCEDURE — 1160F RVW MEDS BY RX/DR IN RCRD: CPT | Performed by: PHYSICIAN ASSISTANT

## 2025-03-25 PROCEDURE — 3008F BODY MASS INDEX DOCD: CPT | Performed by: PHYSICIAN ASSISTANT

## 2025-03-25 PROCEDURE — 1159F MED LIST DOCD IN RCRD: CPT | Performed by: PHYSICIAN ASSISTANT

## 2025-03-25 RX ORDER — SULFAMETHOXAZOLE AND TRIMETHOPRIM 800; 160 MG/1; MG/1
1 TABLET ORAL 2 TIMES DAILY
Qty: 6 TABLET | Refills: 0 | Status: SHIPPED | OUTPATIENT
Start: 2025-03-25 | End: 2025-03-28

## 2025-03-25 ASSESSMENT — ENCOUNTER SYMPTOMS
DIZZINESS: 0
NEUROLOGICAL NEGATIVE: 1
DIFFICULTY URINATING: 0
BACK PAIN: 0
ALLERGIC/IMMUNOLOGIC NEGATIVE: 1
COUGH: 0
VOMITING: 0
NERVOUS/ANXIOUS: 0
DEPRESSION: 0
HEMATURIA: 0
FLANK PAIN: 0
PALPITATIONS: 0
CONSTITUTIONAL NEGATIVE: 1
DYSURIA: 0
OCCASIONAL FEELINGS OF UNSTEADINESS: 0
SHORTNESS OF BREATH: 0
MUSCULOSKELETAL NEGATIVE: 1
RESPIRATORY NEGATIVE: 1
EYES NEGATIVE: 1
LOSS OF SENSATION IN FEET: 0
NAUSEA: 0
WHEEZING: 0
ARTHRALGIAS: 0
ABDOMINAL PAIN: 0
ENDOCRINE NEGATIVE: 1
FREQUENCY: 1
PSYCHIATRIC NEGATIVE: 1
HEADACHES: 0
HEMATOLOGIC/LYMPHATIC NEGATIVE: 1

## 2025-03-25 ASSESSMENT — PATIENT HEALTH QUESTIONNAIRE - PHQ9
2. FEELING DOWN, DEPRESSED OR HOPELESS: NOT AT ALL
SUM OF ALL RESPONSES TO PHQ9 QUESTIONS 1 AND 2: 0
1. LITTLE INTEREST OR PLEASURE IN DOING THINGS: NOT AT ALL

## 2025-03-25 NOTE — PROGRESS NOTES
"Subjective   Patient ID: Sofya Dean is a 64 y.o. female who presents for Mass (Genital area x5 days).    HPI   Patient Sofya Dean 64 y.o. female is here today for follow up   patient continues to have urinary frequency urgencyAnd discomfort questionable interstitial cystitis  Patient states the symptoms have been going on since her kidney stone back in January  Has not seen urology anytime soon  Denies any discharge vaginally or hematuria  Normal bowels  Also complaining of lump in groin area on left side--   patient states feeling well otherwise  denies fever, chills, N/V, headache, dizziness, CP, SOB, palpitations, edema, numbness, tingling, weakness  A chaperone was offered to the patient for the physical exam /  exam and was declined     Review of Systems   Constitutional: Negative.    HENT: Negative.     Eyes: Negative.    Respiratory: Negative.  Negative for cough, shortness of breath and wheezing.    Cardiovascular:  Negative for chest pain and palpitations.   Gastrointestinal:  Negative for abdominal pain, nausea and vomiting.   Endocrine: Negative.    Genitourinary:  Positive for frequency, pelvic pain and urgency. Negative for difficulty urinating, dysuria, flank pain, genital sores, hematuria, menstrual problem, vaginal bleeding, vaginal discharge and vaginal pain.   Musculoskeletal: Negative.  Negative for arthralgias and back pain.   Skin: Negative.  Negative for rash.   Allergic/Immunologic: Negative.    Neurological: Negative.  Negative for dizziness and headaches.   Hematological: Negative.    Psychiatric/Behavioral: Negative.  The patient is not nervous/anxious.         Objective   /83 (BP Location: Right arm, Patient Position: Sitting)   Pulse 87   Ht 1.702 m (5' 7\")   Wt 94.5 kg (208 lb 5.4 oz)   SpO2 98%   BMI 32.63 kg/m²     Physical Exam  Vitals and nursing note reviewed.   Constitutional:       Appearance: Normal appearance.   Cardiovascular:      Rate and Rhythm: Normal " rate and regular rhythm.   Abdominal:      General: Bowel sounds are normal.      Palpations: Abdomen is soft.   Skin:     General: Skin is warm and dry.      Comments: Positive for sebaceous cyst on left inner groin--draining thick white material  No signs of infection   Neurological:      General: No focal deficit present.      Mental Status: She is alert and oriented to person, place, and time.   Psychiatric:         Mood and Affect: Mood normal.         Behavior: Behavior normal.         Thought Content: Thought content normal.         Judgment: Judgment normal.         Assessment/Plan   Problem List Items Addressed This Visit    None  Visit Diagnoses       Frequency of urination    -  Primary    Relevant Medications    sulfamethoxazole-trimethoprim (Bactrim DS) 800-160 mg tablet    Other Relevant Orders    Referral to Urogynecology    POCT UA Automated manually resulted (Completed)    Dysuria        Relevant Medications    sulfamethoxazole-trimethoprim (Bactrim DS) 800-160 mg tablet    Other Relevant Orders    Referral to Urogynecology    Urgency of urination        Relevant Medications    sulfamethoxazole-trimethoprim (Bactrim DS) 800-160 mg tablet    Other Relevant Orders    Referral to Urogynecology    POCT UA Automated manually resulted (Completed)    Sebaceous cyst            Urinary frequency urgency vaginal discomfort  3 days of Septra twice daily  -Discussed medication dosage, usage, goals of therapy, and side effects  Refer to urogyn questionable interstitial cystitis needs further workup  History of kidney stones was seeing urology has not seen them since January  Does not wish to be tested for STDs at this time    Sebaceous cyst  The patient cyst in her groin continue to monitor warm compresses and let drain may need to see dermatology  Not infected at this time       complexity visit of 30 +  minutes face-to-face with at least half of the time discussing  Results of my examination, clinical  findings, impression and diagnoses discussed with the patient as well as results of the latest test/lab work. The patient was in agreement with the plan and verbalized a good understanding of instructions and plans for treatment. At the end of the visit today, the patient felt that all questions had been answered satisfactorily. The patient was pleased with the visit and very appreciative for the care rendered.

## 2025-03-26 ENCOUNTER — HOSPITAL ENCOUNTER (OUTPATIENT)
Dept: RADIOLOGY | Facility: CLINIC | Age: 65
Discharge: HOME | End: 2025-03-26
Payer: MEDICARE

## 2025-03-26 DIAGNOSIS — N20.0 NEPHROLITHIASIS: ICD-10-CM

## 2025-03-26 LAB
CREAT SERPL-MCNC: 0.68 MG/DL (ref 0.5–1.05)
EGFRCR SERPLBLD CKD-EPI 2021: 97 ML/MIN/1.73M2

## 2025-03-26 PROCEDURE — 74176 CT ABD & PELVIS W/O CONTRAST: CPT

## 2025-03-26 PROCEDURE — 74176 CT ABD & PELVIS W/O CONTRAST: CPT | Performed by: RADIOLOGY

## 2025-03-27 ENCOUNTER — OFFICE VISIT (OUTPATIENT)
Dept: OBSTETRICS AND GYNECOLOGY | Facility: CLINIC | Age: 65
End: 2025-03-27
Payer: MEDICARE

## 2025-03-27 VITALS
TEMPERATURE: 97.9 F | SYSTOLIC BLOOD PRESSURE: 141 MMHG | WEIGHT: 208 LBS | BODY MASS INDEX: 32.65 KG/M2 | OXYGEN SATURATION: 98 % | HEART RATE: 103 BPM | HEIGHT: 67 IN | RESPIRATION RATE: 16 BRPM | DIASTOLIC BLOOD PRESSURE: 78 MMHG

## 2025-03-27 DIAGNOSIS — R39.15 URGENCY OF URINATION: ICD-10-CM

## 2025-03-27 DIAGNOSIS — M62.89 HIGH-TONE PELVIC FLOOR DYSFUNCTION: ICD-10-CM

## 2025-03-27 DIAGNOSIS — R30.0 DYSURIA: ICD-10-CM

## 2025-03-27 DIAGNOSIS — N95.2 VAGINAL ATROPHY: ICD-10-CM

## 2025-03-27 DIAGNOSIS — R35.0 FREQUENCY OF URINATION: Primary | ICD-10-CM

## 2025-03-27 LAB — BACTERIA UR CULT: NORMAL

## 2025-03-27 PROCEDURE — 1036F TOBACCO NON-USER: CPT | Performed by: NURSE PRACTITIONER

## 2025-03-27 PROCEDURE — 1160F RVW MEDS BY RX/DR IN RCRD: CPT | Performed by: NURSE PRACTITIONER

## 2025-03-27 PROCEDURE — 99204 OFFICE O/P NEW MOD 45 MIN: CPT | Performed by: NURSE PRACTITIONER

## 2025-03-27 PROCEDURE — 1159F MED LIST DOCD IN RCRD: CPT | Performed by: NURSE PRACTITIONER

## 2025-03-27 PROCEDURE — 99214 OFFICE O/P EST MOD 30 MIN: CPT | Performed by: NURSE PRACTITIONER

## 2025-03-27 PROCEDURE — 1126F AMNT PAIN NOTED NONE PRSNT: CPT | Performed by: NURSE PRACTITIONER

## 2025-03-27 PROCEDURE — 3008F BODY MASS INDEX DOCD: CPT | Performed by: NURSE PRACTITIONER

## 2025-03-27 RX ORDER — LANOLIN ALCOHOL/MO/W.PET/CERES
500 CREAM (GRAM) TOPICAL 2 TIMES DAILY
COMMUNITY

## 2025-03-27 RX ORDER — LANOLIN ALCOHOL/MO/W.PET/CERES
1000 CREAM (GRAM) TOPICAL DAILY
COMMUNITY

## 2025-03-27 RX ORDER — MV/FA/DHA/EPA/FISH OIL/SAW/GNK 400MCG-200
COMBINATION PACKAGE (EA) ORAL
COMMUNITY

## 2025-03-27 RX ORDER — ESTRADIOL 0.1 MG/G
CREAM VAGINAL
Qty: 42.5 G | Refills: 5 | Status: SHIPPED | OUTPATIENT
Start: 2025-03-27

## 2025-03-27 RX ORDER — UBIDECARENONE 30 MG
30 CAPSULE ORAL DAILY
COMMUNITY

## 2025-03-27 RX ORDER — CYCLOBENZAPRINE HCL 5 MG
5 TABLET ORAL DAILY PRN
Qty: 10 TABLET | Refills: 0 | Status: SHIPPED | OUTPATIENT
Start: 2025-03-27

## 2025-03-27 ASSESSMENT — ENCOUNTER SYMPTOMS
OCCASIONAL FEELINGS OF UNSTEADINESS: 0
LOSS OF SENSATION IN FEET: 0
DEPRESSION: 0

## 2025-03-27 ASSESSMENT — PAIN SCALES - GENERAL: PAINLEVEL_OUTOF10: 0-NO PAIN

## 2025-03-27 NOTE — PROGRESS NOTES
Sofya Dean is a 64 y.o. referred by Princess Aguiar PA-C for vaginal burning.     Chief Complaint: kidney stones dx 2024 with stone removal and stent placement. When stent was in she would have pain about 1/2 inch inside vagina. Stent removed 24. Since stent was removed she has had urgency and frequency of urination and the past few days she has had the similar pain inside of the vagina, feels like severe pain and burning. Today she had burning with urination. Pain has not been present today and is not bothering her at this visit.     OB/GYN History:   -   - Number of prior vaginal deliveries: 2   Number of prior operative deliveries forceps x 1 Menopausal: Yes           Postmenopausal bleeding: No  - HRT: none  - Pap up to date: Yes -  and normal   History of abnormal pap: Yes - hx leep  - Sexually active:  Yes         Dyspareunia: No, no change in the quality of this pain when she has been sexually active since these episodes     Prolapse symptoms:   - denies prolapse symptoms   - denies sense of incomplete emptying    Urinary symptoms:   - No baseline issues with the bladder  - 1 culture proven UTIs in past year associated with kidney stone episode  - Excessive fluid intake No    Bowels  - BMs daily, reports soft and easy to pass    Health Maintenance  - Mammogram up to date: Yes -   - Colonoscopy up to date: Yes -  repeat in     Past medical and surgical hx reviewed - pertinent for hypothyroid    Physical Exam  Physical Exam  Constitutional:       Appearance: Normal appearance. She is normal weight.   Genitourinary:      Bladder and urethral meatus normal.      Genitourinary Comments: Burning pain with recreated with palpation over the left side of the anterior vaginal wall above the pubic bone      No vaginal prolapse present.     Moderate vaginal atrophy present.     Pelvic floor neuro is intact.  Pulmonary:      Effort: Pulmonary effort is normal.   Neurological:       Mental Status: She is alert.   Psychiatric:         Mood and Affect: Mood normal.         Speech: Speech normal.         Behavior: Behavior normal.         Thought Content: Thought content normal.         Cognition and Memory: Cognition normal.         Judgment: Judgment normal.     PVR 9 ml by ultrasound    Assessment and Plan    Sofya Dean is a 64 y.o. being treated for vaginal pain and OAB symptoms    Vaginal irritation, ddx pelvic floor muscle spasm  I am not entirely sure of etilogy of pain. Some of the burning was recreated with palpation over palpation of the anterior wall but not to the degree that she experiences the pain.   Rx vaginal estrogen cream, nightly x 2 wks then twice weekly  Rx Flexeril trial - if this is helpful, would be more likely that this pain is MSK in origin  If vaginal E2 is not helpful, I would recommend pelvic floor PT   OAB symptoms  Sometimes symptoms of bladder irritation can persist after passing a stone  Recommend waiting to see if symptoms improve since she believes she recently passed a second stone  If no resolution, can tx for OAB (med or PFPT)    Follow up 1 month or sooner if needed

## 2025-04-01 ENCOUNTER — APPOINTMENT (OUTPATIENT)
Dept: UROLOGY | Facility: CLINIC | Age: 65
End: 2025-04-01
Payer: MEDICARE

## 2025-04-03 DIAGNOSIS — E78.2 MIXED HYPERLIPIDEMIA: ICD-10-CM

## 2025-04-03 RX ORDER — ROSUVASTATIN CALCIUM 10 MG/1
10 TABLET, COATED ORAL DAILY
Qty: 100 TABLET | Refills: 3 | Status: SHIPPED | OUTPATIENT
Start: 2025-04-03 | End: 2026-05-08

## 2025-04-07 DIAGNOSIS — N20.0 NEPHROLITHIASIS: ICD-10-CM

## 2025-04-07 DIAGNOSIS — N13.8 URINARY TRACT OBSTRUCTION BY KIDNEY STONE: ICD-10-CM

## 2025-04-07 DIAGNOSIS — N20.0 URINARY TRACT OBSTRUCTION BY KIDNEY STONE: ICD-10-CM

## 2025-04-07 RX ORDER — TAMSULOSIN HYDROCHLORIDE 0.4 MG/1
0.4 CAPSULE ORAL DAILY
Qty: 30 CAPSULE | Refills: 2 | Status: SHIPPED | OUTPATIENT
Start: 2025-04-07 | End: 2025-07-06

## 2025-04-08 NOTE — PROGRESS NOTES
Virtual or Telephone Consent    An interactive audio and video telecommunication system which permits real time communications between the patient (at the originating site) and provider (at the distant site) was utilized to provide this telehealth service.   Verbal consent was requested and obtained from Sofya Dean on this date, 04/09/25 for a telehealth visit and the patient's location was confirmed at the time of the visit.    Last Visit: 2/25/25  #nephrolithiasis  -s/p RIGHT URS/LL/Stent, and stent removal   - Now with left UVJ 4mm stone  -UCX  Given size of stone, can continue with medical expulsive therapy. If stone does not pass in 6 weeks, will consider intervention.   -encourage hydration   -strain urine   -pain control prn   -Discussed warning signs to present to ED, including fevers/chills/nausea/vomiting or intractable pain   -will repeat CT in 6 wks     Today's visit:  #nephrolithiasis  -12/5/24: Right cystoscopy/LL/Stent for 6mm right UVJ stone  -dropped stone on left (4mm stone at UVJ)  -has occasional back pain  -having burning with urination, sometimes severe   -left stone still present on CT  -has not seen stone pass    UCX, 3/25/25: no growth    Component      Latest Ref Rng 3/25/2025   CREATININE      0.50 - 1.05 mg/dL 0.68    EGFR      > OR = 60 mL/min/1.73m2 97        CT abd/pelvis, 3/26/25:  IMPRESSION:  1. At least partially obstructing stone noted at the left ureterovesical junction measuring 0.5 cm resulting in mild-to-moderate upstream hydroureteronephrosis, slightly progressed when compared to prior exam from 02/11/2025. Unremarkable noncontrast appearance of the right kidney.    CT abd pelvis, 2/11/25, personally reviewed/interpreted:  IMPRESSION:  1. Left ureterolithiasis with 4 mm stone near the left ureterovesical junction as described above with mild hydroureter and hydronephrosis 2. Nonobstructing nephrolithiasis left kidney 3. Scattered uncomplicated colonic  diverticula.    Renal US, 2/4/25, personally reviewed/interpreted:  IMPRESSION:  Mild left-sided hydronephrosis of uncertain etiology.    KUB, 2/4/25:  IMPRESSION:  Nonspecific bowel-gas pattern. No unusual calcific densities are detected though the left kidney is  obscured by bowel gas and fecal debris.    Labs  BMP, 1/21/25:  Component      Latest Ref Rng 1/21/2025   GLUCOSE      74 - 99 mg/dL 89    SODIUM      136 - 145 mmol/L 140    POTASSIUM      3.5 - 5.3 mmol/L 4.7    CHLORIDE      98 - 107 mmol/L 101    Bicarbonate      21 - 32 mmol/L 29    Anion Gap      10 - 20 mmol/L 15    Blood Urea Nitrogen      6 - 23 mg/dL 20    Creatinine      0.50 - 1.05 mg/dL 0.81    Calcium      8.6 - 10.6 mg/dL 10.6    EGFR      >60 mL/min/1.73m*2 81          Medications:    Current Outpatient Medications:     ascorbic acid (Vitamin C) 500 mg ER capsule, Take 1 capsule (500 mg) by mouth 2 times a day., Disp: , Rfl:     co-enzyme Q-10 30 mg capsule, Take 1 capsule (30 mg) by mouth once daily., Disp: , Rfl:     cyanocobalamin (Vitamin B-12) 1,000 mcg tablet, Take 1 tablet (1,000 mcg) by mouth once daily., Disp: , Rfl:     cyclobenzaprine (Flexeril) 5 mg tablet, Take 1 tablet (5 mg) by mouth once daily as needed for muscle spasms for up to 10 doses., Disp: 10 tablet, Rfl: 0    divalproex (Depakote ER) 250 mg 24 hr tablet, Take 2 tablets (500 mg) by mouth once daily in the morning AND 1 tablet (250 mg) once daily in the evening. Do not crush, chew, or split.., Disp: 270 tablet, Rfl: 3    estradiol (Estrace) 0.01 % (0.1 mg/gram) vaginal cream, First 2 weeks: Insert 1/2 gram into the vagina nightly. After initial 2 weeks: Insert 1/2 gram into the vagina twice weekly, Disp: 42.5 g, Rfl: 5    fish oil concentrate (Omega-3) 120-180 mg capsule, Take 1 capsule (1 g) by mouth once daily., Disp: , Rfl:     krill oil 500 mg capsule, Take by mouth., Disp: , Rfl:     multivitamin tablet, Take 1 tablet by mouth once daily., Disp: , Rfl:      phenazopyridine (Pyridium) 100 mg tablet, Take 1 tablet (100 mg) by mouth 3 times a day as needed for bladder spasms. (Patient not taking: Reported on 2/27/2025), Disp: 30 tablet, Rfl: 0    rosuvastatin (Crestor) 10 mg tablet, Take 1 tablet (10 mg) by mouth once daily., Disp: 100 tablet, Rfl: 3    semaglutide 0.25 mg or 0.5 mg (2 mg/3 mL) pen injector, Inject 0.25 mg under the skin 1 (one) time per week. (Patient not taking: Reported on 3/27/2025), Disp: 3 mL, Rfl: 2    tamsulosin (Flomax) 0.4 mg 24 hr capsule, Take 1 capsule (0.4 mg) by mouth once daily., Disp: 30 capsule, Rfl: 2    Allergy:  Allergies   Allergen Reactions    Cefadroxil Other    Chlorpheniramine-Pseudoephed Hives    Iodine Hives and Swelling        Exam  CONSTITUTIONAL:        No acute distress    HEAD:        Normocephalic and atraumatic    CHEST / RESPIRATORY      no excess work of breathing, no respiratory distress,    ABDOMEN / GASTROINTESTINAL:        Abdomen nondistended        Assessment/Plan  #nephrolithiasis  -s/p RIGHT URS/LL/Stent, and stent removal   - Now with left UVJ 4mm stone  -stone still seen on repeat CT     -We discussed different etiologies for stone formation, and ways that could prevent stone formation, specifically hydration and dietary changes  -We discussed the risks, benefits, alternatives and complications associated with ureteroscopy with laser lithotripsy and stone extraction, including but not limited to ureteral perforation, extravasation, stricture formation, bleeding, sepsis, obstruction, inability to reach the stone, inability to fragment the stone, and inability to retrieve all stone fragments, etc.  -we also discussed other options such as observation, ESWL, etc  -We also discussed the need for ancillary procedures such as stent placement and removal, retrograde urography, and percutaneous nephrostomy were also discussed, and the patient was given the opportunity to ask any questions. All questions were answered  to his satisfaction. The patient understands that all anti-coagulation including platelet inhibitors must be discontinued several days prior to the procedure unless other arrangements are made in conjunction with me and the patient's cardiologist or internist.   -The patient also understood that a ureteral stent would likely be placed and removal of the stent is critical. Failure to follow up for stent removal can result in recurrent UTIs, encrustation of the stent, loss of kidney function and need for nephrectomy.        - continue flomax  - will proceed with left ureteroscopy at [facility](will use Cipro for antibiotics)     G 2211  Visit complexity inherent to evaluation and management (E&M) associated with medical care services that serve as the continuing focal point for all needed health care services and/or with medical care services that are part of ongoing care related to a patient's single, serious condition or a complex condition.    Scribe Attestation  By signing my name below, Alisa LOU Scribe   attest that this documentation has been prepared under the direction and in the presence of Cristina Poe MD.

## 2025-04-09 ENCOUNTER — TELEMEDICINE (OUTPATIENT)
Dept: UROLOGY | Facility: CLINIC | Age: 65
End: 2025-04-09
Payer: MEDICARE

## 2025-04-09 DIAGNOSIS — N20.0 NEPHROLITHIASIS: Primary | ICD-10-CM

## 2025-04-09 DIAGNOSIS — N13.30 HYDRONEPHROSIS, UNSPECIFIED HYDRONEPHROSIS TYPE: ICD-10-CM

## 2025-04-09 PROCEDURE — 99214 OFFICE O/P EST MOD 30 MIN: CPT | Performed by: UROLOGY

## 2025-04-09 PROCEDURE — G2211 COMPLEX E/M VISIT ADD ON: HCPCS | Performed by: UROLOGY

## 2025-04-09 RX ORDER — CELECOXIB 400 MG/1
400 CAPSULE ORAL ONCE
OUTPATIENT
Start: 2025-04-09 | End: 2025-04-09

## 2025-04-09 RX ORDER — CIPROFLOXACIN 2 MG/ML
400 INJECTION, SOLUTION INTRAVENOUS ONCE
OUTPATIENT
Start: 2025-04-09 | End: 2025-04-09

## 2025-04-09 RX ORDER — ACETAMINOPHEN 325 MG/1
975 TABLET ORAL ONCE
OUTPATIENT
Start: 2025-04-09 | End: 2025-04-09

## 2025-04-09 RX ORDER — GABAPENTIN 300 MG/1
600 CAPSULE ORAL ONCE
OUTPATIENT
Start: 2025-04-09 | End: 2025-04-09

## 2025-04-10 ENCOUNTER — APPOINTMENT (OUTPATIENT)
Dept: LAB | Facility: HOSPITAL | Age: 65
End: 2025-04-10
Payer: MEDICARE

## 2025-04-10 ENCOUNTER — LAB (OUTPATIENT)
Dept: LAB | Facility: HOSPITAL | Age: 65
End: 2025-04-10
Payer: MEDICARE

## 2025-04-10 LAB
ANION GAP SERPL CALC-SCNC: 15 MMOL/L (ref 10–20)
BUN SERPL-MCNC: 20 MG/DL (ref 6–23)
CALCIUM SERPL-MCNC: 10.3 MG/DL (ref 8.6–10.6)
CHLORIDE SERPL-SCNC: 104 MMOL/L (ref 98–107)
CO2 SERPL-SCNC: 28 MMOL/L (ref 21–32)
CREAT SERPL-MCNC: 0.74 MG/DL (ref 0.5–1.05)
EGFRCR SERPLBLD CKD-EPI 2021: 90 ML/MIN/1.73M*2
ERYTHROCYTE [DISTWIDTH] IN BLOOD BY AUTOMATED COUNT: 14.1 % (ref 11.5–14.5)
GLUCOSE SERPL-MCNC: 111 MG/DL (ref 74–99)
HCT VFR BLD AUTO: 42.6 % (ref 36–46)
HGB BLD-MCNC: 13.5 G/DL (ref 12–16)
MCH RBC QN AUTO: 29.8 PG (ref 26–34)
MCHC RBC AUTO-ENTMCNC: 31.7 G/DL (ref 32–36)
MCV RBC AUTO: 94 FL (ref 80–100)
NRBC BLD-RTO: 0 /100 WBCS (ref 0–0)
PLATELET # BLD AUTO: 232 X10*3/UL (ref 150–450)
POTASSIUM SERPL-SCNC: 5.3 MMOL/L (ref 3.5–5.3)
RBC # BLD AUTO: 4.53 X10*6/UL (ref 4–5.2)
SODIUM SERPL-SCNC: 142 MMOL/L (ref 136–145)
WBC # BLD AUTO: 5.8 X10*3/UL (ref 4.4–11.3)

## 2025-04-10 PROCEDURE — 80048 BASIC METABOLIC PNL TOTAL CA: CPT

## 2025-04-10 PROCEDURE — 85027 COMPLETE CBC AUTOMATED: CPT

## 2025-04-10 PROCEDURE — 87086 URINE CULTURE/COLONY COUNT: CPT

## 2025-04-11 LAB — BACTERIA UR CULT: NO GROWTH

## 2025-04-13 LAB
1,25(OH)2D SERPL-MCNC: NORMAL PG/ML
1,25(OH)2D2 SERPL-MCNC: NORMAL PG/ML
1,25(OH)2D3 SERPL-MCNC: NORMAL PG/ML
ALBUMIN SERPL-MCNC: 4.7 G/DL (ref 3.6–5.1)
BUN SERPL-MCNC: 20 MG/DL (ref 7–25)
BUN/CREAT SERPL: ABNORMAL (CALC) (ref 6–22)
CALCIUM 24H UR-MRATE: NORMAL MG/(24.H)
CALCIUM SERPL-MCNC: 10.4 MG/DL (ref 8.6–10.4)
CALCIUM/CREAT 24H UR: NORMAL MG/G{CREAT}
CHLORIDE SERPL-SCNC: 105 MMOL/L (ref 98–110)
CO2 SERPL-SCNC: 25 MMOL/L (ref 20–32)
CREAT 24H UR-MRATE: 1.19 G/24 H (ref 0.5–2.15)
CREAT 24H UR-MRATE: 1.22 G/24 H (ref 0.5–2.15)
CREAT 24H UR-MRATE: NORMAL G/(24.H)
CREAT SERPL-MCNC: 0.75 MG/DL (ref 0.5–1.05)
EGFRCR SERPLBLD CKD-EPI 2021: 88 ML/MIN/1.73M2
GLUCOSE SERPL-MCNC: 112 MG/DL (ref 65–99)
MAGNESIUM 24H UR-MRATE: NORMAL MG/(24.H)
MAGNESIUM/CREATININE [MASS RATIO] IN 24 HOUR URINE: NORMAL MG/G{CREAT}
OXALATE 24H UR-MRATE: NORMAL MG/(24.H)
OXALATE/CREAT 24H UR: NORMAL
PHOSPHATE SERPL-MCNC: 2.9 MG/DL (ref 2.1–4.3)
POTASSIUM SERPL-SCNC: 4.8 MMOL/L (ref 3.5–5.3)
PTH-INTACT SERPL-MCNC: 41 PG/ML (ref 16–77)
SODIUM 24H UR-SRATE: NORMAL MMOL/(24.H)
SODIUM SERPL-SCNC: 141 MMOL/L (ref 135–146)
SODIUM/CREAT 24H UR-SRTO: NORMAL
SPECIMEN VOL 24H UR: NORMAL L
SPECIMEN VOL 24H UR: NORMAL ML
URATE 24H UR-MRATE: 547 MG/24 H (ref 65–630)
URATE/CREAT 24H UR: 448 MG/G CREAT (ref 90–730)

## 2025-04-16 NOTE — PROGRESS NOTES
Nephrology Outpatient Follow-up Patient Note    Chief Concern:  Follow-up for stones    History Of Present Illness   Sofya Dean is a 64 y.o. female with a history of nephrolithiasis, T2D, SZ disorder  -12/5/24: Right cystoscopy/LL/Stent for 6mm right UVJ stone, stent removed on 2/25/25  - labs 4/10/25:   K 5.3, Ca 10.4  Told to stop Ca suppl last vist   PTN 46, vitD 41  Ca oxalate stone  Drinks tea with turmeric  Drinks >2L fluids a day and watch her salt intake, eats meat regularly   - 24h urine: volume 1.5L, Ca 180, Na 117, uric acid OK, oxalate pending, citrate and supersaturation not done by lab....  - to have cysto again, 5mm stone on ureter...     CT Feb 2025:  Right kidney demonstrates no hydronephrosis or nephrolithiasis.  Visualized course of the right ureter is unremarkable.  Left kidney demonstrates nonobstructing nephrolithiasis with a few  punctate 2-3 mm stones in the inferior pole. There is mild  hydronephrosis and hydroureter with 4 mm stone in the distal left  ureter 1 cm from the ureterovesical junction. Minimal stranding of  the fat along left lateral pelvic wall      Previous Treatment History:  Ca oxalate stone, first time, bilateral burden. Will get metabolic study, needs to stop taking Ca supplement, maintain fluid intake 2.5L/d, Na intake <2.3g/day, less meat and more veggies/fruits. Will get PTH, vitamin Ds, 24h urine before next appt in ~2 months     Past Medical History  She has a past medical history of Abnormal Pap smear of cervix, Difficult intubation (03/04/2024), Epilepsy, Hyperlipidemia, Hypothyroidism, Nephrolithiasis, GLENIS (obstructive sleep apnea), and Varicella.  Problem List[1]    Surgical History  She has a past surgical history that includes Colonoscopy w/ polypectomy; Eye surgery; Cosmetic surgery; Endometrial ablation; and Cervical biopsy w/ loop electrode excision.     Social History  She reports that she quit smoking about 8 years ago. Her smoking use included  "cigarettes. She has never used smokeless tobacco. She reports current alcohol use. She reports that she does not use drugs.    Family History  Family History[2]     Allergies  Cefadroxil, Chlorpheniramine-pseudoephed, and Iodine    Review of Systems  A 12-point review of systems was performed and noted be negative except for that which was mentioned in the history of present illness     Last Recorded Vitals  /80   Pulse 75   Temp 36.8 °C (98.2 °F) (Oral)   Ht 1.702 m (5' 7\")   Wt 95.7 kg (211 lb)   SpO2 96%   BMI 33.05 kg/m²      Physical Exam:  Constitutional:  No acute distress  Respiration:  Breathing comfortably, no stridor  Cardiovascular:  No clubbing/cyanosis/edema in hands  Eyes:  EOM intact, sclera normal  Neuro:  Alert and oriented times 3, Cranial nerves II-XII grossly intact and symmetric bilaterally. No asterixis  Head and Face:  Symmetric facial features, no masses or lesions, sinuses non-tender to palpation  Neck/Lymph:  No LAD, no thyroid masses, trachea midline, No JVD  Skin:  no visible rash or scratching marks   Psych:  Alert and oriented with appropriate mood and affect      Medications:  Medication Documentation Review Audit       Reviewed by JONATHON Quiros (Nurse Practitioner) on 03/28/25 at 2039      Medication Order Taking? Sig Documenting Provider Last Dose Status   ascorbic acid (Vitamin C) 500 mg ER capsule [id number] Yes Take 1 capsule (500 mg) by mouth 2 times a day. Historical Provider, MD  Active     Discontinued 03/25/25 1337   co-enzyme Q-10 30 mg capsule [id number] Yes Take 1 capsule (30 mg) by mouth once daily. Historical Provider, MD  Active   cyanocobalamin (Vitamin B-12) 1,000 mcg tablet [id number] Yes Take 1 tablet (1,000 mcg) by mouth once daily. Historical Provider, MD  Active   cyclobenzaprine (Flexeril) 5 mg tablet [id number]  Take 1 tablet (5 mg) by mouth once daily as needed for muscle spasms for up to 10 doses. JONATHON Quiros  Active " "  divalproex (Depakote ER) 250 mg 24 hr tablet [id number] Yes Take 2 tablets (500 mg) by mouth once daily in the morning AND 1 tablet (250 mg) once daily in the evening. Do not crush, chew, or split.. Nataliya Mendes APRN-CNP  Active   estradiol (Estrace) 0.01 % (0.1 mg/gram) vaginal cream [id number]  First 2 weeks: Insert 1/2 gram into the vagina nightly. After initial 2 weeks: Insert 1/2 gram into the vagina twice weekly JOURDAN Quiros-CNP  Active   fish oil concentrate (Omega-3) 120-180 mg capsule [id number] Yes Take 1 capsule (1 g) by mouth once daily. Historical Provider, MD  Active   krill oil 500 mg capsule [id number] Yes Take by mouth. Historical Provider, MD  Active   multivitamin tablet [id number] Yes Take 1 tablet by mouth once daily. Historical Provider, MD  Active   phenazopyridine (Pyridium) 100 mg tablet [id number]  Take 1 tablet (100 mg) by mouth 3 times a day as needed for bladder spasms.   Patient not taking: Reported on 2/27/2025    Hayley Gallo MD  Active   rosuvastatin (Crestor) 10 mg tablet [id number] Yes Take 1 tablet (10 mg) by mouth once daily. Princess Aguiar PA-C  Active   semaglutide 0.25 mg or 0.5 mg (2 mg/3 mL) pen injector [id number]  Inject 0.25 mg under the skin 1 (one) time per week.   Patient not taking: Reported on 3/27/2025    Princess Aguiar PA-C  Active   sulfamethoxazole-trimethoprim (Bactrim DS) 800-160 mg tablet [id number]  Take 1 tablet by mouth 2 times a day for 3 days.   Patient not taking: Reported on 3/27/2025    Princess Aguiar PA-C  Active                    Relevant Results Recent labs reviewed in the EMR.  Lab Results   Component Value Date    HGB 13.5 04/10/2025    HGB 12.2 12/06/2024    HGB 11.2 (L) 12/05/2024    MCV 94 04/10/2025    MCV 93 12/06/2024    MCV 93 12/05/2024     04/10/2025     12/06/2024     12/05/2024       No results found for: \"FERRITIN\", \"IRON\"    Lab Results   Component Value Date     04/10/2025 "     04/10/2025    K 5.3 04/10/2025    K 4.8 04/10/2025     04/10/2025     04/10/2025    BUN 20 04/10/2025    BUN 20 04/10/2025    CREATININE 0.74 04/10/2025    CREATININE 0.75 04/10/2025       Imaging:  I personally reviewed then and this is my impression:        Assessment/Plan   1. Nephrolithiasis (Primary)  - told to add 35-40oz to her daily fluid intake and restrict more her salt, oxalate still pending and will call prn. Also will call lab, needs to get citrate and supersaturation next time, RTO 6 months  - Citrate, Urine; Future  - Creatinine, 24 Hour Urine; Future  - Sodium, 24 Hour Urine; Future  - Magnesium, 24 Hour Urine; Future  - Calcium, 24 Hour Urine; Future  - Supersaturation Profile, 24 Hour Urine; Future  - Uric Acid, 24 Hour Urine; Future  - Oxalate, Urine, 24 Hour; Future  - Follow Up In Nephrology; Future  - Citrate, Urine     - BP suboptimal today, check BP at home and call if high  Nikolas Quinonez MD  Nephrology and Hypertension         [1]   Patient Active Problem List  Diagnosis    Hyperlipidemia    Hypothyroidism    Ichthyosis vulgaris    Melanocytic nevi of other parts of face    Hemangioma of skin and subcutaneous tissue    Actinic keratosis    Other hypertrophic disorders of the skin    Seizure disorder (Multi)    Sleep apnea    Tubular adenoma of colon    Vitamin D deficiency    Nephrolithiasis    Urinary tract obstruction by kidney stone    Ureteral stent present    Hydronephrosis   [2]   Family History  Problem Relation Name Age of Onset    Cancer Mother Kinjal Sarmiento     Hypertension Mother Kinjla Sarmiento     Cancer Father Lane Sarmiento     Kidney disease Father Lane Sarmiento

## 2025-04-17 ENCOUNTER — APPOINTMENT (OUTPATIENT)
Facility: CLINIC | Age: 65
End: 2025-04-17
Payer: MEDICARE

## 2025-04-17 VITALS
HEIGHT: 67 IN | WEIGHT: 211 LBS | DIASTOLIC BLOOD PRESSURE: 80 MMHG | OXYGEN SATURATION: 96 % | SYSTOLIC BLOOD PRESSURE: 142 MMHG | HEART RATE: 75 BPM | BODY MASS INDEX: 33.12 KG/M2 | TEMPERATURE: 98.2 F

## 2025-04-17 DIAGNOSIS — N20.0 NEPHROLITHIASIS: Primary | ICD-10-CM

## 2025-04-17 PROCEDURE — 3008F BODY MASS INDEX DOCD: CPT | Performed by: INTERNAL MEDICINE

## 2025-04-17 PROCEDURE — 1159F MED LIST DOCD IN RCRD: CPT | Performed by: INTERNAL MEDICINE

## 2025-04-17 PROCEDURE — 99214 OFFICE O/P EST MOD 30 MIN: CPT | Performed by: INTERNAL MEDICINE

## 2025-04-18 DIAGNOSIS — N20.0 NEPHROLITHIASIS: Primary | ICD-10-CM

## 2025-04-18 DIAGNOSIS — E72.53: ICD-10-CM

## 2025-04-18 LAB
1,25(OH)2D SERPL-MCNC: 46 PG/ML (ref 18–72)
1,25(OH)2D2 SERPL-MCNC: <8 PG/ML
1,25(OH)2D3 SERPL-MCNC: 46 PG/ML
ALBUMIN SERPL-MCNC: 4.7 G/DL (ref 3.6–5.1)
BUN SERPL-MCNC: 20 MG/DL (ref 7–25)
BUN/CREAT SERPL: ABNORMAL (CALC) (ref 6–22)
CALCIUM 24H UR-MRATE: 180 MG/24 H (ref 35–250)
CALCIUM SERPL-MCNC: 10.4 MG/DL (ref 8.6–10.4)
CALCIUM/CREAT 24H UR: 152 MG/G CREAT (ref 30–275)
CHLORIDE SERPL-SCNC: 105 MMOL/L (ref 98–110)
CO2 SERPL-SCNC: 25 MMOL/L (ref 20–32)
CREAT 24H UR-MRATE: 1.14 G/24 H (ref 0.5–2.15)
CREAT 24H UR-MRATE: 1.19 G/24 H (ref 0.5–2.15)
CREAT 24H UR-MRATE: 1.22 G/24 H (ref 0.5–2.15)
CREAT 24H UR-MRATE: 1.29 G/24 H (ref 0.5–2.15)
CREAT SERPL-MCNC: 0.75 MG/DL (ref 0.5–1.05)
EGFRCR SERPLBLD CKD-EPI 2021: 88 ML/MIN/1.73M2
GLUCOSE SERPL-MCNC: 112 MG/DL (ref 65–99)
MAGNESIUM 24H UR-MRATE: 136 MG/24 H (ref 18–130)
MAGNESIUM/CREATININE [MASS RATIO] IN 24 HOUR URINE: 115 MG/G CREAT (ref 30–135)
OXALATE 24H UR-MRATE: 100.1 MG/24 H (ref 3.6–38)
OXALATE/CREAT 24H UR: 87.8 MG/G CREAT (ref 1.6–37)
PHOSPHATE SERPL-MCNC: 2.9 MG/DL (ref 2.1–4.3)
POTASSIUM SERPL-SCNC: 4.8 MMOL/L (ref 3.5–5.3)
PTH-INTACT SERPL-MCNC: 41 PG/ML (ref 16–77)
SODIUM 24H UR-SRATE: 117 MMOL/24 H (ref 52–380)
SODIUM SERPL-SCNC: 141 MMOL/L (ref 135–146)
SODIUM/CREAT 24H UR-SRTO: 91 MMOL/G CREAT (ref 50–230)
SPECIMEN VOL 24H UR: 1540 ML
SPECIMEN VOL 24H UR: NORMAL ML
URATE 24H UR-MRATE: 547 MG/24 H (ref 65–630)
URATE/CREAT 24H UR: 448 MG/G CREAT (ref 90–730)

## 2025-04-18 RX ORDER — CALCIUM CARBONATE 300MG(750)
400 TABLET,CHEWABLE ORAL DAILY
Qty: 90 TABLET | Refills: 3 | Status: SHIPPED | OUTPATIENT
Start: 2025-04-18 | End: 2026-04-18

## 2025-04-21 DIAGNOSIS — N20.0 NEPHROLITHIASIS: Primary | ICD-10-CM

## 2025-04-22 ENCOUNTER — CLINICAL SUPPORT (OUTPATIENT)
Dept: PREADMISSION TESTING | Facility: HOSPITAL | Age: 65
End: 2025-04-22
Payer: MEDICARE

## 2025-04-22 RX ORDER — CHOLECALCIFEROL (VITAMIN D3) 50 MCG
50 TABLET ORAL NIGHTLY
COMMUNITY

## 2025-04-22 ASSESSMENT — ENCOUNTER SYMPTOMS
DIARRHEA: 0
NAUSEA: 0
SHORTNESS OF BREATH: 0
VOMITING: 0
EYE PAIN: 0
FEVER: 0
CONFUSION: 0
CHILLS: 0
COUGH: 0
EYE DISCHARGE: 0
WOUND: 0
SINUS CONGESTION: 0
ABDOMINAL PAIN: 0
RHINORRHEA: 0

## 2025-04-22 NOTE — CPM/PAT NURSE NOTE
CPM/PAT Nurse Note      Name: Sofya Dean (Sofya Dean)  /Age: 1960/64 y.o.       Medical History[1]    Surgical History[2]    Patient Sexual activity questions deferred to the physician.    Family History[3]    Allergies[4]    Prior to Admission medications    Medication Sig Start Date End Date Taking? Authorizing Provider   cholecalciferol (Vitamin D-3) 50 mcg (2,000 units) tablet Take 1 tablet (50 mcg) by mouth once daily at bedtime.   Yes Historical Provider, MD   co-enzyme Q-10 30 mg capsule Take 1 capsule (30 mg) by mouth once daily in the morning.   Yes Historical Provider, MD   cyanocobalamin (Vitamin B-12) 1,000 mcg tablet Take 1 tablet (1,000 mcg) by mouth once daily at bedtime.   Yes Historical Provider, MD   divalproex (Depakote ER) 250 mg 24 hr tablet Take 2 tablets (500 mg) by mouth once daily in the morning AND 1 tablet (250 mg) once daily in the evening. Do not crush, chew, or split.. 10/2/24 10/2/25 Yes JONATHON Coy   estradiol (Estrace) 0.01 % (0.1 mg/gram) vaginal cream First 2 weeks: Insert 1/2 gram into the vagina nightly. After initial 2 weeks: Insert 1/2 gram into the vagina twice weekly 3/27/25  Yes JONATHON Quiros   krill oil 500 mg capsule Take by mouth once daily at bedtime.   Yes Historical Provider, MD   magnesium oxide (Mag-Ox) 400 mg tablet Take 1 tablet (400 mg) by mouth once daily.  Patient taking differently: Take 1 tablet (400 mg) by mouth once daily at bedtime. 25 Yes Nikolas Quinonez MD   multivitamin tablet Take 1 tablet by mouth once daily.   Yes Historical Provider, MD   rosuvastatin (Crestor) 10 mg tablet Take 1 tablet (10 mg) by mouth once daily.  Patient taking differently: Take 1 tablet (10 mg) by mouth once daily. 25 pt reports se only takes one tablet every Monday morning 4/3/25 5/8/26 Yes Princess Aguiar PA-C   fish oil concentrate (Omega-3) 120-180 mg capsule Take 1 capsule (1 g) by mouth once daily at  bedtime. 2/19/18 4/22/25 Yes Historical Provider, MD   tamsulosin (Flomax) 0.4 mg 24 hr capsule Take 1 capsule (0.4 mg) by mouth once daily.  Patient taking differently: Take 1 capsule (0.4 mg) by mouth once daily at bedtime. 4/7/25 7/6/25  Cristina Poe MD   ascorbic acid (Vitamin C) 500 mg ER capsule Take 1 capsule (500 mg) by mouth 2 times a day.  4/22/25  Historical Provider, MD   cyclobenzaprine (Flexeril) 5 mg tablet Take 1 tablet (5 mg) by mouth once daily as needed for muscle spasms for up to 10 doses. 3/27/25 4/22/25  JONATHON Quiros   phenazopyridine (Pyridium) 100 mg tablet Take 1 tablet (100 mg) by mouth 3 times a day as needed for bladder spasms.  Patient not taking: Reported on 2/27/2025 12/6/24 4/22/25  Hayley Gallo MD   semaglutide 0.25 mg or 0.5 mg (2 mg/3 mL) pen injector Inject 0.25 mg under the skin 1 (one) time per week.  Patient not taking: Reported on 3/27/2025 1/21/25 4/22/25  Princess Aguiar PA-C        PAT ROS:   Constitutional:    no fever   no chills  Neuro/Psych:    Pt reports last seizure was over 30 years ago   no confusion  Eyes:    no discharge   no pain   use of corrective lenses  Ears:    no ear pain   no ear discharge   no hearing aides  Nose:    no nasal discharge   no sinus congestion  Mouth:   Throat:    no throat pain  Neck:   Cardio:    no chest pain  Respiratory:    Pt reports she uses CPAP every night   no cough   no shortness of breath  Endocrine:   GI:    no abdominal pain   no diarrhea   no nausea   no vomiting  :   Musculoskeletal:   Hematologic:    no history of blood transfusion   no blood clots  Skin:   no sores/wound   no rash      DASI Risk Score    No data to display       Caprini DVT Assessment      Flowsheet Row ED to Hosp-Admission (Discharged) from 12/4/2024 in [facility] with Hayley Gallo MD and Ping Blood MD   DVT Score (IF A SCORE IS NOT CALCULATING, MUST SELECT A BMI TO COMPLETE) 5  filed at 12/04/2024 1925   BMI (BMI MUST BE CHOSEN) 31-40 (Obesity) filed at 12/04/2024 1925          Modified Frailty Index    No data to display       [id number] Stroke Risk Points  Current as of just now        N/A 0 to 9 Points:      Last Change: N/A          The SVD0TV3-AJBr risk score (Kanika MOORE, et al. 2009. © 2010 American College [employer] Chest [employer]) quantifies the risk of stroke for a patient with atrial fibrillation. For patients without atrial fibrillation or under the age of 18 this score appears as N/A. Higher score values generally indicate higher risk of stroke.        This score is not applicable to this patient. Components are not calculated.          Revised Cardiac Risk Index    No data to display       Apfel Simplified Score    No data to display       Risk Analysis Index Results This Encounter    No data found in the last 10 encounters.       Stop Bang Score      Flowsheet Row Clinical Support from 4/22/2025 in [facility]   Do you snore loudly? 1 filed at 04/22/2025 1346   Do you often feel tired or fatigued after your sleep? 0 filed at 04/22/2025 1346   Has anyone ever observed you stop breathing in your sleep? 1 filed at 04/22/2025 1346   Do you have or are you being treated for high blood pressure? 0 filed at 04/22/2025 1346   Recent BMI (Calculated) 33 filed at 04/22/2025 1346   Is BMI greater than 35 kg/m2? 0=No filed at 04/22/2025 1346   Age older than 50 years old? 1=Yes filed at 04/22/2025 1346   Gender - Male 0=No filed at 04/22/2025 1346          Prodigy: High Risk  Total Score: 13              Prodigy Age Score      Prodigy SDB Score          ARISCAT Score for Postoperative Pulmonary Complications    No data to display       [place] Perioperative Risk for Myocardial Infarction or Cardiac Arrest (ADDI)    No data to display         Nurse Plan of Action:                [1]   Past Medical History:  Diagnosis Date    Abnormal Pap smear of cervix     Allergic DURICEF     Colon polyp     Difficult intubation 03/04/2024    Difficult mask: Oral airway and 2 hand required to BMV. Grade 3 view with mas. 2B with glidescope.    Epilepsy     Fractures     back, knee    Hyperlipidemia     Hypothyroidism     Nephrolithiasis     GLENIS (obstructive sleep apnea)     Pneumonia     Urinary tract infection     Varicella    [2]   Past Surgical History:  Procedure Laterality Date    AUGMENTATION MAMMAPLASTY  3-4-24    CERVICAL BIOPSY  W/ LOOP ELECTRODE EXCISION      COLONOSCOPY      COLONOSCOPY W/ POLYPECTOMY      COSMETIC SURGERY  IMPLANTS    Remote Breast augmentation/implants. Implant replacement 03/04/2024.    CYSTOSCOPY  december and january    ENDOMETRIAL ABLATION      EYE SURGERY      KIDNEY STONE SURGERY  dec. 5, 2024    TONSILLECTOMY     [3]   Family History  Problem Relation Name Age of Onset    Cancer Mother Kinjal Sarmiento     Hypertension Mother Kinjal Sarmiento     Cancer Father Lane Sarmiento     Kidney disease Father Lane Sarmiento     Diabetes Maternal Grandmother Bloxom Price     Diabetes Maternal Grandfather Srinivas Kirkpatrick    [4]   Allergies  Allergen Reactions    Chlorpheniramine-Pseudoephed Hives    Iodine Hives and Swelling    Cefadroxil Itching and Rash

## 2025-04-23 ENCOUNTER — APPOINTMENT (OUTPATIENT)
Dept: UROLOGY | Facility: CLINIC | Age: 65
End: 2025-04-23
Payer: MEDICARE

## 2025-05-02 ENCOUNTER — APPOINTMENT (OUTPATIENT)
Dept: PREADMISSION TESTING | Facility: HOSPITAL | Age: 65
End: 2025-05-02
Payer: MEDICARE

## 2025-05-05 ENCOUNTER — APPOINTMENT (OUTPATIENT)
Dept: PREADMISSION TESTING | Facility: HOSPITAL | Age: 65
End: 2025-05-05
Payer: MEDICARE

## 2025-05-07 ENCOUNTER — PRE-ADMISSION TESTING (OUTPATIENT)
Dept: PREADMISSION TESTING | Facility: HOSPITAL | Age: 65
End: 2025-05-07
Payer: MEDICARE

## 2025-05-07 VITALS
DIASTOLIC BLOOD PRESSURE: 77 MMHG | RESPIRATION RATE: 16 BRPM | SYSTOLIC BLOOD PRESSURE: 143 MMHG | TEMPERATURE: 98.2 F | OXYGEN SATURATION: 98 % | HEART RATE: 85 BPM | HEIGHT: 67 IN | BODY MASS INDEX: 31.25 KG/M2 | WEIGHT: 199.08 LBS

## 2025-05-07 DIAGNOSIS — N20.0 NEPHROLITHIASIS: ICD-10-CM

## 2025-05-07 DIAGNOSIS — N13.30 HYDRONEPHROSIS, UNSPECIFIED HYDRONEPHROSIS TYPE: ICD-10-CM

## 2025-05-07 PROCEDURE — 99204 OFFICE O/P NEW MOD 45 MIN: CPT | Performed by: NURSE PRACTITIONER

## 2025-05-07 ASSESSMENT — PAIN SCALES - GENERAL: PAINLEVEL_OUTOF10: 0 - NO PAIN

## 2025-05-07 ASSESSMENT — DUKE ACTIVITY SCORE INDEX (DASI)
CAN YOU WALK A BLOCK OR TWO ON LEVEL GROUND: YES
DASI METS SCORE: 7
CAN YOU DO MODERATE WORK AROUND THE HOUSE LIKE VACUUMING, SWEEPING FLOORS OR CARRYING GROCERIES: YES
CAN YOU PARTICIPATE IN STRENOUS SPORTS LIKE SWIMMING, SINGLES TENNIS, FOOTBALL, BASKETBALL, OR SKIING: NO
CAN YOU DO LIGHT WORK AROUND THE HOUSE LIKE DUSTING OR WASHING DISHES: YES
CAN YOU PARTICIPATE IN MODERATE RECREATIONAL ACTIVITIES LIKE GOLF, BOWLING, DANCING, DOUBLES TENNIS OR THROWING A BASEBALL OR FOOTBALL: YES
CAN YOU HAVE SEXUAL RELATIONS: YES
TOTAL_SCORE: 34.7
CAN YOU WALK INDOORS, SUCH AS AROUND YOUR HOUSE: YES
CAN YOU DO HEAVY WORK AROUND THE HOUSE LIKE SCRUBBING FLOORS OR LIFTING AND MOVING HEAVY FURNITURE: NO
CAN YOU RUN A SHORT DISTANCE: NO
CAN YOU DO YARD WORK LIKE RAKING LEAVES, WEEDING OR PUSHING A MOWER: YES
CAN YOU CLIMB A FLIGHT OF STAIRS OR WALK UP A HILL: YES
CAN YOU TAKE CARE OF YOURSELF (EAT, DRESS, BATHE, OR USE TOILET): YES

## 2025-05-07 ASSESSMENT — PAIN - FUNCTIONAL ASSESSMENT: PAIN_FUNCTIONAL_ASSESSMENT: 0-10

## 2025-05-07 NOTE — H&P (VIEW-ONLY)
[facility]/PAT Evaluation       Name: Sofya Dean (Sofya Dean)  /Age: 1960/64 y.o.     In-Person       Chief Complaint: Nephrolithiasis, Hydronephrosis      HPI  Patient is an alert and oriented 64 year old female scheduled for a  Ureteral Lithotripsy Laser on 25 with Dr. Poe for  Nephrolithiasis, Hydronephrosis . PMHX includes nephrolithiasis, DM2,epilepsy, hypothyroidism, GLENIS . Presents to [facility] PAT today for perioperative risk stratification and optimization.     Medical History[1]    Surgical History[2]    Patient Sexual activity questions deferred to the physician.    Family History[3]    Allergies[4]    Prior to Admission medications    Medication Sig Start Date End Date Taking? Authorizing Provider   cholecalciferol (Vitamin D-3) 50 mcg (2,000 units) tablet Take 1 tablet (50 mcg) by mouth once daily at bedtime.    Historical Provider, MD   co-enzyme Q-10 30 mg capsule Take 1 capsule (30 mg) by mouth once daily in the morning.    Historical Provider, MD   cyanocobalamin (Vitamin B-12) 1,000 mcg tablet Take 1 tablet (1,000 mcg) by mouth once daily at bedtime.    Historical Provider, MD   divalproex (Depakote ER) 250 mg 24 hr tablet Take 2 tablets (500 mg) by mouth once daily in the morning AND 1 tablet (250 mg) once daily in the evening. Do not crush, chew, or split.. 10/2/24 10/2/25  JONATHON Coy   estradiol (Estrace) 0.01 % (0.1 mg/gram) vaginal cream First 2 weeks: Insert 1/2 gram into the vagina nightly. After initial 2 weeks: Insert 1/2 gram into the vagina twice weekly 3/27/25   JONATHON Quiros   krill oil 500 mg capsule Take by mouth once daily at bedtime.    Historical Provider, MD   magnesium oxide (Mag-Ox) 400 mg tablet Take 1 tablet (400 mg) by mouth once daily.  Patient taking differently: Take 1 tablet (400 mg) by mouth once daily at bedtime. 25  Nikolas Quinonez MD   multivitamin tablet Take 1 tablet by mouth once daily.    Historical  Provider, MD   rosuvastatin (Crestor) 10 mg tablet Take 1 tablet (10 mg) by mouth once daily.  Patient taking differently: Take 1 tablet (10 mg) by mouth once daily. 4-22-25 pt reports se only takes one tablet every Monday morning 4/3/25 5/8/26  Princess Aguiar PA-C   tamsulosin (Flomax) 0.4 mg 24 hr capsule Take 1 capsule (0.4 mg) by mouth once daily.  Patient taking differently: Take 1 capsule (0.4 mg) by mouth once daily at bedtime. 4/7/25 7/6/25  Cristina Poe MD         Review of Systems   Constitutional: Negative for chills, decreased appetite, diaphoresis, fever and malaise/fatigue.   Eyes:  Negative for blurred vision and double vision.   Cardiovascular:  Negative for chest pain, claudication, cyanosis, dyspnea on exertion, irregular heartbeat, leg swelling, near-syncope and palpitations.   Respiratory:  Negative for cough, hemoptysis, shortness of breath and wheezing.    Endocrine: Negative for cold intolerance, heat intolerance, polydipsia, polyphagia and polyuria.   Gastrointestinal:  Negative for abdominal pain, constipation, diarrhea, dysphagia, nausea and vomiting.   Genitourinary:  Negative for bladder incontinence, dysuria, hematuria, incomplete emptying, nocturia, frequency, pelvic pain and urgency.   Neurological:  Negative for headaches, light-headedness, paresthesias, sensory change and weakness.   Psychiatric/Behavioral:  Negative for altered mental status.    Musculoskeletal: Negative for myalgias, arthralgias     Vitals and nursing note reviewed.     Physical exam  Constitutional:       Appearance: Normal appearance. She is Obese.   HENT:      Head: Normocephalic and atraumatic.      Mouth/Throat:      Mouth: Mucous membranes are moist.      Pharynx: Oropharynx is clear.   Eyes:      Extraocular Movements: Extraocular movements intact.      Conjunctiva/sclera: Conjunctivae normal.      Pupils: Pupils are equal, round, and reactive to light.   Cardiovascular:      PMI at left  midclavicular line. Normal rate. Regular rhythm. Normal S1. Normal S2.       Murmurs: There is no murmur.      No gallop.  No click. No rub.       No audible carotid bruit     No lower extremity edema on exam  Pulmonary:      Effort: Pulmonary effort is normal.      Breath sounds: Normal breath sounds.   Abdominal:      General: Abdomen is flat. Bowel sounds are normal.      Palpations: Abdomen is soft and non-tender  Musculoskeletal:      Cervical back: Normal range of motion and neck supple.   Skin:     General: Skin is warm and dry.      Capillary Refill: Capillary refill takes less than 2 seconds.   Neurological:      General: No focal deficit present.      Mental Status: She is alert and oriented to person, place, and time. Mental status is at baseline.   Psychiatric:         Mood and Affect: Mood normal.         Behavior: Behavior normal.         Thought Content: Thought content normal.         Judgment: Judgment normal.     Vitals and nursing note reviewed. Physical exam within normal limits.         DASI Risk Score      Flowsheet Row Pre-Admission Testing from 5/7/2025 in [facility]   Can you take care of yourself (eat, dress, bathe, or use toilet)?  2.75 filed at 05/07/2025 1538   Can you walk indoors, such as around your house? 1.75 filed at 05/07/2025 1538   Can you walk a block or two on level ground?  2.75 filed at 05/07/2025 1538   Can you climb a flight of stairs or walk up a hill? 5.5 filed at 05/07/2025 1538   Can you run a short distance? 0 filed at 05/07/2025 1538   Can you do light work around the house like dusting or washing dishes? 2.7 filed at 05/07/2025 1538   Can you do moderate work around the house like vacuuming, sweeping floors or carrying groceries? 3.5 filed at 05/07/2025 1538   Can you do heavy work around the house like scrubbing floors or lifting and moving heavy furniture?  0 filed at 05/07/2025 1538   Can you do yard work like raking leaves, weeding or pushing a  mower? 4.5 filed at 05/07/2025 1538   Can you have sexual relations? 5.25 filed at 05/07/2025 1538   Can you participate in moderate recreational activities like golf, bowling, dancing, doubles tennis or throwing a baseball or football? 6 filed at 05/07/2025 1538   Can you participate in strenous sports like swimming, singles tennis, football, basketball, or skiing? 0 filed at 05/07/2025 1538   DASI SCORE 34.7 filed at 05/07/2025 1538   METS Score (Will be calculated only when all the questions are answered) 7 filed at 05/07/2025 1538          Caprini DVT Assessment      Flowsheet Row Pre-Admission Testing from 5/7/2025 in [facility] ED to Hosp-Admission (Discharged) from 12/4/2024 in [facility] with Hayley Gallo MD and Ping Blood MD   DVT Score (IF A SCORE IS NOT CALCULATING, MUST SELECT A BMI TO COMPLETE) 7 filed at 05/07/2025 1537 5 filed at 12/04/2024 1925   Surgical Factors Major surgery planned, including arthroscopic and laproscopic (1-2 hours) filed at 05/07/2025 1537 --   BMI (BMI MUST BE CHOSEN) 31-40 (Obesity) filed at 05/07/2025 1537 31-40 (Obesity) filed at 12/04/2024 1925          Modified Frailty Index    No data to display       [id number] Stroke Risk Points  Current as of just now        N/A 0 to 9 Points:      Last Change: N/A          The [id number]-NMKm risk score (Lip TERESA, et al. 2009. © 2010 American College of Chest Physicians) quantifies the risk of stroke for a patient with atrial fibrillation. For patients without atrial fibrillation or under the age of 18 this score appears as N/A. Higher score values generally indicate higher risk of stroke.        This score is not applicable to this patient. Components are not calculated.          Revised Cardiac Risk Index      Flowsheet Row Pre-Admission Testing from 5/7/2025 in [facility]   High-Risk Surgery (Intraperitoneal, Intrathoracic,Suprainguinal vascular) 0 filed at  05/07/2025 1538   History of ischemic heart disease (History of MI, History of positive exercuse test, Current chest paint considered due to myocardial ischemia, Use of nitrate therapy, ECG with pathological Q Waves) 0 filed at 05/07/2025 1538   History of congestive heart failure (pulmonary edemia, bilateral rales or S3 gallop, Paroxysmal nocturnal dyspnea, CXR showing pulmonary vascular redistribution) 0 filed at 05/07/2025 1538   History of cerebrovascular disease (Prior TIA or stroke) 0 filed at 05/07/2025 1538   Pre-operative insulin treatment 0 filed at 05/07/2025 1538   Pre-operative creatinine>2 mg/dl 0 filed at 05/07/2025 1538   Revised Cardiac Risk Calculator 0 filed at 05/07/2025 1538          Apfel Simplified Score    No data to display       Risk Analysis Index Results This Encounter    No data found in the last 10 encounters.       Stop Bang Score      Flowsheet Row Pre-Admission Testing from 5/7/2025 in [facility] Clinical Support from 4/22/2025 in [facility]   Do you snore loudly? 1 filed at 05/07/2025 1456 1 filed at 04/22/2025 1346   Do you often feel tired or fatigued after your sleep? 0 filed at 05/07/2025 1456 0 filed at 04/22/2025 1346   Has anyone ever observed you stop breathing in your sleep? 1 filed at 05/07/2025 1456 1 filed at 04/22/2025 1346   Do you have or are you being treated for high blood pressure? 0 filed at 05/07/2025 1456 0 filed at 04/22/2025 1346   Recent BMI (Calculated) 31.2 filed at 05/07/2025 1456 33 filed at 04/22/2025 1346   Is BMI greater than 35 kg/m2? 0=No filed at 05/07/2025 1456 0=No filed at 04/22/2025 1346   Age older than 50 years old? 1=Yes filed at 05/07/2025 1456 1=Yes filed at 04/22/2025 1346   Is your neck circumference greater than 17 inches (Male) or 16 inches (Female)? 0 filed at 05/07/2025 1456 --   Gender - Male 0=No filed at 05/07/2025 1456 0=No filed at 04/22/2025 1346   STOP-BANG Total Score 3 filed at 05/07/2025  1456 --          Prodigy: High Risk  Total Score: 13              Prodigy Age Score      Prodigy SDB Score          ARISCAT Score for Postoperative Pulmonary Complications    No data to display       [place] Perioperative Risk for Myocardial Infarction or Cardiac Arrest (ADDI)      Flowsheet Row Pre-Admission Testing from 5/7/2025 in [facility]   Calculated Age Score 1.3 filed at 05/07/2025 1539   Functional Status  0 filed at 05/07/2025 1539   ASA Class  -3.29 filed at 05/07/2025 1539   Creatinine 0 filed at 05/07/2025 1539   Type of Procedure  -0.26 filed at 05/07/2025 1539   ADDI Total Score  -7.5 filed at 05/07/2025 1539   ADDI % 0.06 filed at 05/07/2025 1539              Assessment & Plan:    Neuro:  Epilepsy (Depakote) follows with Neurology (last seizure was 30 yrs ago)    HEENT/Airway:  GLENIS compliant with CPAP   STOP-BANG Score- 3 points moderate risk for GLENIS    Mallampati::  III    TM distance::  >3 FB    Neck ROM::  Full  Dentures-reports upper permanent bridge  Crowns-denies  Implants-denies    Cardiovascular:  HLD (rosuvastatin)   METS: 7  RCRI: 0 points, 3.9%  risk for postoperative MACE   ADDI: 0.06% risk for postoperative MACE    Pulmonary:  No diagnosis or significant findings on chart review or clinical presentation and evaluation.   ARISCAT: <26 points, 1.6% risk of in-hospital postoperative pulmonary complication  PRODIGY: Moderate risk for opioid induced respiratory depression  Smoking History-she quit smoking in 2024  Deep breathing handout given    Renal/Urinary:    Nephrolithiasis   the patient is at increased risk of perioperative renal complications secondary to age>/= 55. Preventative measures include BP monitoring, medication compliance, and hydration management.   CMP  Creatinine-0.74  GFR-90    Endocrine:  Hypothyroidism  QMD1O-5.3    Hematologic/Immunology:  No diagnosis or significant findings on chart review or clinical presentation and evaluation.  The patient is not a  Jehovah’s witness and will accept blood and blood products if medically indicated.   History of previous blood transfusions No  CBC  HGB-13.5/42.6  Caprini Score 7, patient at Moderate for postoperative DVT. Pt supplied education/VTE handout  Anticoagulation use: No     Gastrointestinal:   No diagnosis or significant findings on chart review or clinical presentation and evaluation.   Recreational drug use: none  Alcohol use none    Infectious disease:   No diagnosis or significant findings on chart review or clinical presentation and evaluation.     Musculoskeletal:   Bilateral breast implants   JHFRAT score-3 points. low risk for falls    Anesthesia:  ASA 2 - Patient with mild systemic disease with no functional limitations  Anticipated anesthesia-general  History of General anesthesia- yes  Complications- No anesthesia complications  No family history of anesthesia complications  Pt states she is a difficult intubation    Labs & Imaging ordered:  Nickel/metal allergy-negative  Shellfish allergy-negative  Pt has an IODINE allergy     Discussed with patient medication instructions, NPO guidelines, and any questions or concerns.      time spent 45 minutes          [1]   Past Medical History:  Diagnosis Date    Abnormal Pap smear of cervix     Allergic DURICEF    Colon polyp     Difficult intubation 03/04/2024    Difficult mask: Oral airway and 2 hand required to BMV. Grade 3 view with mas. 2B with glidescope.    Epilepsy     Fractures     back, knee    Hyperlipidemia     Hypothyroidism     Nephrolithiasis     GLENIS (obstructive sleep apnea)     Pneumonia     Urinary tract infection     Varicella    [2]   Past Surgical History:  Procedure Laterality Date    AUGMENTATION MAMMAPLASTY  3-4-24    CERVICAL BIOPSY  W/ LOOP ELECTRODE EXCISION      COLONOSCOPY      COLONOSCOPY W/ POLYPECTOMY      COSMETIC SURGERY  IMPLANTS    Remote Breast augmentation/implants. Implant replacement 03/04/2024.    CYSTOSCOPY  december and  january    ENDOMETRIAL ABLATION      EYE SURGERY      KIDNEY STONE SURGERY  dec. 5, 2024    TONSILLECTOMY     [3]   Family History  Problem Relation Name Age of Onset    Cancer Mother Kinjal Sarmiento     Hypertension Mother Kinjal Sarmiento     Cancer Father Lane Sarmiento     Kidney disease Father Lane Sarmiento     Diabetes Maternal Grandmother Kinjal Price     Diabetes Maternal Grandfather Srinivas Kirkpatrick    [4]   Allergies  Allergen Reactions    Chlorpheniramine-Pseudoephed Hives    Iodine Hives and Swelling    Cefadroxil Itching and Rash

## 2025-05-07 NOTE — CPM/PAT H&P
[facility]/PAT Evaluation       Name: Sofya Dean (Sofya Dean)  /Age: 1960/64 y.o.     In-Person       Chief Complaint: Nephrolithiasis, Hydronephrosis      HPI  Patient is an alert and oriented 64 year old female scheduled for a  Ureteral Lithotripsy Laser on 25 with Dr. Poe for  Nephrolithiasis, Hydronephrosis . PMHX includes nephrolithiasis, DM2,epilepsy, hypothyroidism, GLENIS . Presents to [facility] PAT today for perioperative risk stratification and optimization.     Medical History[1]    Surgical History[2]    Patient Sexual activity questions deferred to the physician.    Family History[3]    Allergies[4]    Prior to Admission medications    Medication Sig Start Date End Date Taking? Authorizing Provider   cholecalciferol (Vitamin D-3) 50 mcg (2,000 units) tablet Take 1 tablet (50 mcg) by mouth once daily at bedtime.    Historical Provider, MD   co-enzyme Q-10 30 mg capsule Take 1 capsule (30 mg) by mouth once daily in the morning.    Historical Provider, MD   cyanocobalamin (Vitamin B-12) 1,000 mcg tablet Take 1 tablet (1,000 mcg) by mouth once daily at bedtime.    Historical Provider, MD   divalproex (Depakote ER) 250 mg 24 hr tablet Take 2 tablets (500 mg) by mouth once daily in the morning AND 1 tablet (250 mg) once daily in the evening. Do not crush, chew, or split.. 10/2/24 10/2/25  JONATHON Coy   estradiol (Estrace) 0.01 % (0.1 mg/gram) vaginal cream First 2 weeks: Insert 1/2 gram into the vagina nightly. After initial 2 weeks: Insert 1/2 gram into the vagina twice weekly 3/27/25   JONATHON Quiros   krill oil 500 mg capsule Take by mouth once daily at bedtime.    Historical Provider, MD   magnesium oxide (Mag-Ox) 400 mg tablet Take 1 tablet (400 mg) by mouth once daily.  Patient taking differently: Take 1 tablet (400 mg) by mouth once daily at bedtime. 25  Nikolas Quinonez MD   multivitamin tablet Take 1 tablet by mouth once daily.    Historical  Provider, MD   rosuvastatin (Crestor) 10 mg tablet Take 1 tablet (10 mg) by mouth once daily.  Patient taking differently: Take 1 tablet (10 mg) by mouth once daily. 4-22-25 pt reports se only takes one tablet every Monday morning 4/3/25 5/8/26  Princess Aguiar PA-C   tamsulosin (Flomax) 0.4 mg 24 hr capsule Take 1 capsule (0.4 mg) by mouth once daily.  Patient taking differently: Take 1 capsule (0.4 mg) by mouth once daily at bedtime. 4/7/25 7/6/25  Cristina Poe MD         Review of Systems   Constitutional: Negative for chills, decreased appetite, diaphoresis, fever and malaise/fatigue.   Eyes:  Negative for blurred vision and double vision.   Cardiovascular:  Negative for chest pain, claudication, cyanosis, dyspnea on exertion, irregular heartbeat, leg swelling, near-syncope and palpitations.   Respiratory:  Negative for cough, hemoptysis, shortness of breath and wheezing.    Endocrine: Negative for cold intolerance, heat intolerance, polydipsia, polyphagia and polyuria.   Gastrointestinal:  Negative for abdominal pain, constipation, diarrhea, dysphagia, nausea and vomiting.   Genitourinary:  Negative for bladder incontinence, dysuria, hematuria, incomplete emptying, nocturia, frequency, pelvic pain and urgency.   Neurological:  Negative for headaches, light-headedness, paresthesias, sensory change and weakness.   Psychiatric/Behavioral:  Negative for altered mental status.    Musculoskeletal: Negative for myalgias, arthralgias     Vitals and nursing note reviewed.     Physical exam  Constitutional:       Appearance: Normal appearance. She is Obese.   HENT:      Head: Normocephalic and atraumatic.      Mouth/Throat:      Mouth: Mucous membranes are moist.      Pharynx: Oropharynx is clear.   Eyes:      Extraocular Movements: Extraocular movements intact.      Conjunctiva/sclera: Conjunctivae normal.      Pupils: Pupils are equal, round, and reactive to light.   Cardiovascular:      PMI at left  midclavicular line. Normal rate. Regular rhythm. Normal S1. Normal S2.       Murmurs: There is no murmur.      No gallop.  No click. No rub.       No audible carotid bruit     No lower extremity edema on exam  Pulmonary:      Effort: Pulmonary effort is normal.      Breath sounds: Normal breath sounds.   Abdominal:      General: Abdomen is flat. Bowel sounds are normal.      Palpations: Abdomen is soft and non-tender  Musculoskeletal:      Cervical back: Normal range of motion and neck supple.   Skin:     General: Skin is warm and dry.      Capillary Refill: Capillary refill takes less than 2 seconds.   Neurological:      General: No focal deficit present.      Mental Status: She is alert and oriented to person, place, and time. Mental status is at baseline.   Psychiatric:         Mood and Affect: Mood normal.         Behavior: Behavior normal.         Thought Content: Thought content normal.         Judgment: Judgment normal.     Vitals and nursing note reviewed. Physical exam within normal limits.         DASI Risk Score      Flowsheet Row Pre-Admission Testing from 5/7/2025 in [facility]   Can you take care of yourself (eat, dress, bathe, or use toilet)?  2.75 filed at 05/07/2025 1538   Can you walk indoors, such as around your house? 1.75 filed at 05/07/2025 1538   Can you walk a block or two on level ground?  2.75 filed at 05/07/2025 1538   Can you climb a flight of stairs or walk up a hill? 5.5 filed at 05/07/2025 1538   Can you run a short distance? 0 filed at 05/07/2025 1538   Can you do light work around the house like dusting or washing dishes? 2.7 filed at 05/07/2025 1538   Can you do moderate work around the house like vacuuming, sweeping floors or carrying groceries? 3.5 filed at 05/07/2025 1538   Can you do heavy work around the house like scrubbing floors or lifting and moving heavy furniture?  0 filed at 05/07/2025 1538   Can you do yard work like raking leaves, weeding or pushing a  mower? 4.5 filed at 05/07/2025 1538   Can you have sexual relations? 5.25 filed at 05/07/2025 1538   Can you participate in moderate recreational activities like golf, bowling, dancing, doubles tennis or throwing a baseball or football? 6 filed at 05/07/2025 1538   Can you participate in strenous sports like swimming, singles tennis, football, basketball, or skiing? 0 filed at 05/07/2025 1538   DASI SCORE 34.7 filed at 05/07/2025 1538   METS Score (Will be calculated only when all the questions are answered) 7 filed at 05/07/2025 1538          Caprini DVT Assessment      Flowsheet Row Pre-Admission Testing from 5/7/2025 in [facility] ED to Hosp-Admission (Discharged) from 12/4/2024 in [facility] Bldg A 7 with Hayley Gallo MD and Ping Blood MD   DVT Score (IF A SCORE IS NOT CALCULATING, MUST SELECT A BMI TO COMPLETE) 7 filed at 05/07/2025 1537 5 filed at 12/04/2024 1925   Surgical Factors Major surgery planned, including arthroscopic and laproscopic (1-2 hours) filed at 05/07/2025 1537 --   BMI (BMI MUST BE CHOSEN) 31-40 (Obesity) filed at 05/07/2025 1537 31-40 (Obesity) filed at 12/04/2024 1925          Modified Frailty Index    No data to display       [id number] Stroke Risk Points  Current as of just now        N/A 0 to 9 Points:      Last Change: N/A          The [id number] risk score (Lip TERESA, et al. 2009. © 2010 [employer]) quantifies the risk of stroke for a patient with atrial fibrillation. For patients without atrial fibrillation or under the age of 18 this score appears as N/A. Higher score values generally indicate higher risk of stroke.        This score is not applicable to this patient. Components are not calculated.          Revised Cardiac Risk Index      Flowsheet Row Pre-Admission Testing from 5/7/2025 in [facility]   High-Risk Surgery (Intraperitoneal, Intrathoracic,Suprainguinal vascular) 0 filed at  05/07/2025 1538   History of ischemic heart disease (History of MI, History of positive exercuse test, Current chest paint considered due to myocardial ischemia, Use of nitrate therapy, ECG with pathological Q Waves) 0 filed at 05/07/2025 1538   History of congestive heart failure (pulmonary edemia, bilateral rales or S3 gallop, Paroxysmal nocturnal dyspnea, CXR showing pulmonary vascular redistribution) 0 filed at 05/07/2025 1538   History of cerebrovascular disease (Prior TIA or stroke) 0 filed at 05/07/2025 1538   Pre-operative insulin treatment 0 filed at 05/07/2025 1538   Pre-operative creatinine>2 mg/dl 0 filed at 05/07/2025 1538   Revised Cardiac Risk Calculator 0 filed at 05/07/2025 1538          Apfel Simplified Score    No data to display       Risk Analysis Index Results This Encounter    No data found in the last 10 encounters.       Stop Bang Score      Flowsheet Row Pre-Admission Testing from 5/7/2025 in [facility] Clinical Support from 4/22/2025 in [facility]   Do you snore loudly? 1 filed at 05/07/2025 1456 1 filed at 04/22/2025 1346   Do you often feel tired or fatigued after your sleep? 0 filed at 05/07/2025 1456 0 filed at 04/22/2025 1346   Has anyone ever observed you stop breathing in your sleep? 1 filed at 05/07/2025 1456 1 filed at 04/22/2025 1346   Do you have or are you being treated for high blood pressure? 0 filed at 05/07/2025 1456 0 filed at 04/22/2025 1346   Recent BMI (Calculated) 31.2 filed at 05/07/2025 1456 33 filed at 04/22/2025 1346   Is BMI greater than 35 kg/m2? 0=No filed at 05/07/2025 1456 0=No filed at 04/22/2025 1346   Age older than 50 years old? 1=Yes filed at 05/07/2025 1456 1=Yes filed at 04/22/2025 1346   Is your neck circumference greater than 17 inches (Male) or 16 inches (Female)? 0 filed at 05/07/2025 1456 --   Gender - Male 0=No filed at 05/07/2025 1456 0=No filed at 04/22/2025 1346   STOP-BANG Total Score 3 filed at 05/07/2025  1456 --          Prodigy: High Risk  Total Score: 13              Prodigy Age Score      Prodigy SDB Score          ARISCAT Score for Postoperative Pulmonary Complications    No data to display       [place] Perioperative Risk for Myocardial Infarction or Cardiac Arrest (ADDI)      Flowsheet Row Pre-Admission Testing from 5/7/2025 in [facility]   Calculated Age Score 1.3 filed at 05/07/2025 1539   Functional Status  0 filed at 05/07/2025 1539   ASA Class  -3.29 filed at 05/07/2025 1539   Creatinine 0 filed at 05/07/2025 1539   Type of Procedure  -0.26 filed at 05/07/2025 1539   ADDI Total Score  -7.5 filed at 05/07/2025 1539   ADDI % 0.06 filed at 05/07/2025 1539              Assessment & Plan:    Neuro:  Epilepsy (Depakote) follows with Neurology (last seizure was 30 yrs ago)    HEENT/Airway:  GLENIS compliant with CPAP   STOP-BANG Score- 3 points moderate risk for GLENIS    Mallampati::  III    TM distance::  >3 FB    Neck ROM::  Full  Dentures-reports upper permanent bridge  Crowns-denies  Implants-denies    Cardiovascular:  HLD (rosuvastatin)   METS: 7  RCRI: 0 points, 3.9%  risk for postoperative MACE   ADDI: 0.06% risk for postoperative MACE    Pulmonary:  No diagnosis or significant findings on chart review or clinical presentation and evaluation.   ARISCAT: <26 points, 1.6% risk of in-hospital postoperative pulmonary complication  PRODIGY: Moderate risk for opioid induced respiratory depression  Smoking History-she quit smoking in 2024  Deep breathing handout given    Renal/Urinary:    Nephrolithiasis   the patient is at increased risk of perioperative renal complications secondary to age>/= 55. Preventative measures include BP monitoring, medication compliance, and hydration management.   CMP  Creatinine-0.74  GFR-90    Endocrine:  Hypothyroidism  OVK1P-8.3    Hematologic/Immunology:  No diagnosis or significant findings on chart review or clinical presentation and evaluation.  The patient is not a  Jehovah’s witness and will accept blood and blood products if medically indicated.   History of previous blood transfusions No  CBC  HGB-13.5/42.6  Caprini Score 7, patient at Moderate for postoperative DVT. Pt supplied education/VTE handout  Anticoagulation use: No     Gastrointestinal:   No diagnosis or significant findings on chart review or clinical presentation and evaluation.   Recreational drug use: none  Alcohol use none    Infectious disease:   No diagnosis or significant findings on chart review or clinical presentation and evaluation.     Musculoskeletal:   Bilateral breast implants   JHFRAT score-3 points. low risk for falls    Anesthesia:  ASA 2 - Patient with mild systemic disease with no functional limitations  Anticipated anesthesia-general  History of General anesthesia- yes  Complications- No anesthesia complications  No family history of anesthesia complications  Pt states she is a difficult intubation    Labs & Imaging ordered:  Nickel/metal allergy-negative  Shellfish allergy-negative  Pt has an IODINE allergy     Discussed with patient medication instructions, NPO guidelines, and any questions or concerns.      time spent 45 minutes          [1]   Past Medical History:  Diagnosis Date    Abnormal Pap smear of cervix     Allergic DURICEF    Colon polyp     Difficult intubation 03/04/2024    Difficult mask: Oral airway and 2 hand required to BMV. Grade 3 view with mas. 2B with glidescope.    Epilepsy     Fractures     back, knee    Hyperlipidemia     Hypothyroidism     Nephrolithiasis     GLENIS (obstructive sleep apnea)     Pneumonia     Urinary tract infection     Varicella    [2]   Past Surgical History:  Procedure Laterality Date    AUGMENTATION MAMMAPLASTY  3-4-24    CERVICAL BIOPSY  W/ LOOP ELECTRODE EXCISION      COLONOSCOPY      COLONOSCOPY W/ POLYPECTOMY      COSMETIC SURGERY  IMPLANTS    Remote Breast augmentation/implants. Implant replacement 03/04/2024.    CYSTOSCOPY  december and  january    ENDOMETRIAL ABLATION      EYE SURGERY      KIDNEY STONE SURGERY  dec. 5, 2024    TONSILLECTOMY     [3]   Family History  Problem Relation Name Age of Onset    Cancer Mother Kinjal Sarmiento     Hypertension Mother Kinjal Sarmiento     Cancer Father Lane Sarmiento     Kidney disease Father Lane Sarmiento     Diabetes Maternal Grandmother Kinjal Price     Diabetes Maternal Grandfather Srinivas Kirkpatrick    [4]   Allergies  Allergen Reactions    Chlorpheniramine-Pseudoephed Hives    Iodine Hives and Swelling    Cefadroxil Itching and Rash

## 2025-05-07 NOTE — PREPROCEDURE INSTRUCTIONS
Medication List            Accurate as of May 7, 2025  3:08 PM. Always use your most recent med list.                cholecalciferol 50 mcg (2,000 units) tablet  Commonly known as: Vitamin D-3  Additional Medication Adjustments for Surgery: Take last dose 7 days before surgery  Notes to patient: last dose preoperatively on 5/8/25     co-enzyme Q-10 30 mg capsule  Additional Medication Adjustments for Surgery: Take last dose 7 days before surgery  Notes to patient: last dose preoperatively on 5/8/25     cyanocobalamin 1,000 mcg tablet  Commonly known as: Vitamin B-12  Additional Medication Adjustments for Surgery: Take last dose 7 days before surgery  Notes to patient: last dose preoperatively on 5/8/25     divalproex 250 mg 24 hr tablet  Commonly known as: Depakote ER  Take 2 tablets (500 mg) by mouth once daily in the morning AND 1 tablet (250 mg) once daily in the evening. Do not crush, chew, or split..  Medication Adjustments for Surgery: Take/Use as prescribed     estradiol 0.01 % (0.1 mg/gram) vaginal cream  Commonly known as: Estrace  First 2 weeks: Insert 1/2 gram into the vagina nightly. After initial 2 weeks: Insert 1/2 gram into the vagina twice weekly  Medication Adjustments for Surgery: Do Not take on the morning of surgery     krill oil 500 mg capsule  Additional Medication Adjustments for Surgery: Take last dose 7 days before surgery  Notes to patient: last dose preoperatively on 5/8/25     magnesium oxide 400 mg tablet  Commonly known as: Mag-Ox  Take 1 tablet (400 mg) by mouth once daily.  Additional Medication Adjustments for Surgery: Take last dose 7 days before surgery  Notes to patient: last dose preoperatively on 5/8/25     multivitamin tablet  Notes to patient: last dose preoperatively on 5/8/25     rosuvastatin 10 mg tablet  Commonly known as: Crestor  Take 1 tablet (10 mg) by mouth once daily.  Medication Adjustments for Surgery: Take/Use as prescribed     tamsulosin 0.4 mg 24 hr  capsule  Commonly known as: Flomax  Take 1 capsule (0.4 mg) by mouth once daily.  Medication Adjustments for Surgery: Take/Use as prescribed            NPO Instructions:     Do not eat any food after midnight the night before your surgery/procedure.  You may have clear liquids until TWO hours before surgery/procedure. This includes water, black tea/coffee, (no milk or cream) apple juice and electrolyte drinks (Gatorade).  You may chew gum up to TWO hours before your surgery/procedure.     Additional Instructions:      Seven/Six Days before Surgery:  Review your medication instructions, stop indicated medications  Five Days before Surgery:  Review your medication instructions, stop indicated medications  Three Days before Surgery:  Review your medication instructions, stop indicated medications  The Day before Surgery:  No smoking or alcohol use 24 hours before surgery  Review your medication instructions, stop indicated medications  You will be contacted regarding the time of your arrival to facility and surgery time  Do not eat any food after Midnight  Day of Surgery:  Review your medication instructions, take indicated medications  If you have diabetes, please check your fasting blood sugar upon awakening.  If fasting blood sugar is <80 mg/dl, drink 100 ml of apple juice, time limit of 2 hours before  You may have clear liquids until TWO hours before surgery/procedure.  This includes water, black tea/coffee, (no milk or cream) apple juice and electrolyte drinks (Gatorade)  You may chew gum up to TWO hours before your surgery/procedure  Wear  comfortable loose fitting clothing  Do not use moisturizers, creams, lotions or perfume  All jewelry and valuables should be left at home     CONTACT SURGEON'S OFFICE IF YOU DEVELOP:  * Fever = 100.4 F   * New respiratory symptoms (e.g. cough, shortness of breath, respiratory distress, sore throat)  * Recent loss of taste or smell  *Flu like symptoms such as headache, fatigue  or gastrointestinal symptoms  * You develop any open sores, shingles, burning or painful urination   AND/OR:  * You no longer wish to have the surgery.  * Any other personal circumstances change that may lead to the need to cancel or defer this surgery.  *You were admitted to any hospital within one week of your planned procedure.     SMOKING:  *Quitting smoking can make a huge difference to your health and recovery from surgery.    *If you need help with quitting, call 6-050-QUIT-NOW.     THE DAY BEFORE SURGERY:  *Do not eat any food after midnight the night before your surgery.   *You may have up to TEN OUNCES of clear liquids until TWO hours before your instructed ARRIVAL TIME to hospital. This includes water, black tea/coffee, (no milk or cream) apple juice, clear broth and electrolyte drinks (Gatorade). Please avoid clear liquids that are red in color.   *You may chew gum/mints up to TWO hours before your surgery/procedure.     SURGICAL TIME:  *You will be contacted between 2 p.m. and 3 p.m. the business day before your surgery with your arrival time.  *If you haven't received a call by 3pm, call [phone]  *Scheduled surgery times may change and you will be notified if this occurs-check your personal voicemail for any updates.     ON THE MORNING OF SURGERY:  *Wear comfortable, loose fitting clothing.   *Do not use moisturizers, creams, lotions or perfume.  *All jewelry and valuables should be left at home.  *Prosthetic devices such as contact lenses, hearing aids, dentures, eyelash extensions, hairpins and body piercing must be removed before surgery.     BRING WITH YOU:  *Photo ID and insurance card  *Current list of medications and allergies  *Pacemaker/Defibrillator/Heart stent cards  *CPAP machine and mask  *Slings/splints/crutches  *Copy of your complete Advanced Directive/DHPOA-if applicable  *Neurostimulator implant remote     PARKING AND ARRIVAL:  *Check in at the Main Entrance desk and let them  know you are here for surgery.     IF YOU ARE HAVING OUTPATIENT/SAME DAY SURGERY:  *A responsible adult MUST accompany you at the time of discharge and stay with you for 24 hours after your surgery.  *You may NOT drive yourself home after surgery.  *You may use a taxi or ride sharing service ([employer], Uber) to return home ONLY if you are accompanied by a friend or family member.  *Instructions for resuming your medications will be provided by your surgeon.     Thank you for coming to Pre Admission testing.      If I have prescribed medication please don't forget to  at your pharmacy.      Any questions about today's visit call [phone] and leave a message in the general mailbox.     Patient instructed to ambulate as soon as possible postoperatively to decrease thromboembolic risk.     Thank you for visiting [facility].  If you have any changes to your health condition, please call the surgeons office to alert them and give them details of your symptoms.        Preoperative Fasting Guidelines     Why must I stop eating and drinking near surgery time?  With sedation, food or liquid in your stomach can enter your lungs causing serious complications  Increases nausea and vomiting     When do I need to stop eating and drinking before my surgery?  Do not eat any food after midnight the night before your surgery/procedure.  You may have up to TEN ounces of clear liquid until TWO hours before your instructed arrival time to the hospital.  This includes water, black tea/coffee, (no milk or cream) apple juice, and electrolyte drinks (Gatorade)  You may chew gum until TWO hours before your surgery/procedure        Additional Instructions:      The Day before Surgery:  -Review your medication instructions, stop indicated medications  -You will be contacted in the evening regarding the time of your arrival to facility and surgery time     Day of Surgery:  -Review your medication instructions,  take indicated medications  -Wear comfortable loose fitting clothing  -Do not use moisturizers, creams, lotions or perfume  -All jewelry and valuables should be left at home                   Preoperative Brain Exercises     What are brain exercises?  A brain exercise is any activity that engages your thinking (cognitive) skills.     What types of activities are considered brain exercises?  Jigsaw puzzles, crossword puzzles, word jumble, memory games, word search, and many more.  Many can be found free online or on your phone via a mobile niranjan.     Why should I do brain exercises before my surgery?  More recent research has shown brain exercise before surgery can lower the risk of postoperative delirium (confusion) which can be especially important for older adults.  Patients who did brain exercises for 5 to 10 minutes/day in the days before surgery, cut their risk of postoperative delirium in half up to 1 week after surgery.                         [facility]     Preoperative Deep Breathing Exercises     Why it is important to do deep breathing exercises before my surgery?  Deep breathing exercises strengthen your breathing muscles.  This helps you to recover after your surgery and decreases the chance of breathing complications.        How are the deep breathing exercises done?  Sit straight with your back supported.  Breathe in deeply and slowly through your nose. Your lower rib cage should expand and your abdomen may move forward.  Hold that breath for 3 to 5 seconds.  Breathe out through pursed lips, slowly and completely.  Rest and repeat 10 times every hour while awake.  Rest longer if you become dizzy or lightheaded.                      [facility]     Preoperative Deep Breathing Exercises     Why it is important to do deep breathing exercises before my surgery?  Deep breathing exercises strengthen your breathing muscles.  This helps you to recover after  your surgery and decreases the chance of breathing complications.        How are the deep breathing exercises done?  Sit straight with your back supported.  Breathe in deeply and slowly through your nose. Your lower rib cage should expand and your abdomen may move forward.  Hold that breath for 3 to 5 seconds.  Breathe out through pursed lips, slowly and completely.  Rest and repeat 10 times every hour while awake.  Rest longer if you become dizzy or lightheaded.        Patient Information: Incentive Spirometer  What is an incentive spirometer?  An incentive spirometer is a device used before and after surgery to “exercise” your lungs.  It helps you to take deeper breaths to expand your lungs.  Below is an example of a basic incentive spirometer.  The device you receive may differ slightly but they all function the same.    Why do I need to use an incentive spirometer?  Using your incentive spirometer prepares your lungs for surgery and helps prevent lung problems after surgery.  How do I use my incentive spirometer?  When you're using your incentive spirometer, make sure to breathe through your mouth. If you breathe through your nose, the incentive spirometer won't work properly. You can hold your nose if you have trouble.  If you feel dizzy at any time, stop and rest. Try again at a later time.  Follow the steps below:  Set up your incentive spirometer, expand the flexible tubing and connect to the outlet.  Sit upright in a chair or bed. Hold the incentive spirometer at eye level.   Put the mouthpiece in your mouth and close your lips tightly around it. Slowly breathe out (exhale) completely.  Breathe in (inhale) slowly through your mouth as deeply as you can. As you take a breath, you will see the piston rise inside the large column. While the piston rises, the indicator should move upwards. It should stay in between the 2 arrows (see Figure).  Try to get the piston as high as you can, while keeping the indicator  between the arrows.   If the indicator doesn't stay between the arrows, you're breathing either too fast or too slow.  When you get it as high as you can, hold your breath for 10 seconds, or as long as possible. While you're holding your breath, the piston will slowly fall to the base of the spirometer.  Once the piston reaches the bottom of the spirometer, breathe out slowly through your mouth. Rest for a few seconds.  Repeat 10 times. Try to get the piston to the same level with each breath.  Repeat every hour while awake  You can carefully clean the outside of the mouthpiece with an alcohol wipe or soap and water.       Patient and Family Education             Ways You Can Help Prevent Blood Clots                    This handout explains some simple things you can do to help prevent blood clots.      Blood clots are blockages that can form in the body's veins. When a blood clot forms in your deep veins, it may be called a deep vein thrombosis, or DVT for short. Blood clots can happen in any part of the body where blood flows, but they are most common in the arms and legs. If a piece of a blood clot breaks free and travels to the lungs, it is called a pulmonary embolus (PE). A PE can be a very serious problem.         Being in the hospital or having surgery can raise your chances of getting a blood clot because you may not be well enough to move around as much as you normally do.         Ways you can help prevent blood clots in the hospital           Wearing SCDs. SCDs stands for Sequential Compression Devices.   SCDs are special sleeves that wrap around your legs  They attach to a pump that fills them with air to gently squeeze your legs every few minutes.   This helps return the blood in your legs to your heart.   SCDs should only be taken off when walking or bathing.   SCDs may not be comfortable, but they can help save your life.                                            Wearing compression stockings - if  your doctor orders them. These special snug fitting stockings gently squeeze your legs to help blood flow.       Walking. Walking helps move the blood in your legs.   If your doctor says it is ok, try walking the halls at least   5 times a day. Ask us to help you get up, so you don't fall.      Taking any blood thinning medicines your doctor orders.        Page 1 of 2            [facility]; 3/23   Ways you can help prevent blood clots at home         Wearing compression stockings - if your doctor orders them. ? Walking - to help move the blood in your legs.       Taking any blood thinning medicines your doctor orders.      Signs of a blood clot or PE        Tell your doctor or nurse know right away if you have of the problems listed below.    If you are at home, seek medical care right away. Call 911 for chest pain or problems breathing.                Signs of a blood clot (DVT) - such as pain,  swelling, redness or warmth in your arm or leg      Signs of a pulmonary embolism (PE) - such as chest pain or feeling short of breath

## 2025-05-16 ENCOUNTER — HOSPITAL ENCOUNTER (OUTPATIENT)
Facility: HOSPITAL | Age: 65
Setting detail: OUTPATIENT SURGERY
Discharge: HOME | End: 2025-05-16
Attending: UROLOGY | Admitting: UROLOGY
Payer: MEDICARE

## 2025-05-16 ENCOUNTER — ANESTHESIA (OUTPATIENT)
Dept: OPERATING ROOM | Facility: HOSPITAL | Age: 65
End: 2025-05-16
Payer: MEDICARE

## 2025-05-16 ENCOUNTER — APPOINTMENT (OUTPATIENT)
Dept: RADIOLOGY | Facility: HOSPITAL | Age: 65
End: 2025-05-16
Payer: MEDICARE

## 2025-05-16 ENCOUNTER — ANESTHESIA EVENT (OUTPATIENT)
Dept: OPERATING ROOM | Facility: HOSPITAL | Age: 65
End: 2025-05-16
Payer: MEDICARE

## 2025-05-16 VITALS
WEIGHT: 201.9 LBS | DIASTOLIC BLOOD PRESSURE: 87 MMHG | HEART RATE: 57 BPM | OXYGEN SATURATION: 97 % | BODY MASS INDEX: 31.69 KG/M2 | TEMPERATURE: 97.5 F | RESPIRATION RATE: 16 BRPM | SYSTOLIC BLOOD PRESSURE: 155 MMHG | HEIGHT: 67 IN

## 2025-05-16 DIAGNOSIS — N13.30 HYDRONEPHROSIS, UNSPECIFIED HYDRONEPHROSIS TYPE: ICD-10-CM

## 2025-05-16 DIAGNOSIS — N20.0 NEPHROLITHIASIS: Primary | ICD-10-CM

## 2025-05-16 PROBLEM — T88.4XXA DIFFICULT INTUBATION: Status: ACTIVE | Noted: 2025-05-16

## 2025-05-16 PROCEDURE — 7100000010 HC PHASE TWO TIME - EACH INCREMENTAL 1 MINUTE: Performed by: UROLOGY

## 2025-05-16 PROCEDURE — 3700000002 HC GENERAL ANESTHESIA TIME - EACH INCREMENTAL 1 MINUTE: Performed by: UROLOGY

## 2025-05-16 PROCEDURE — 2500000004 HC RX 250 GENERAL PHARMACY W/ HCPCS (ALT 636 FOR OP/ED): Mod: JZ | Performed by: NURSE ANESTHETIST, CERTIFIED REGISTERED

## 2025-05-16 PROCEDURE — 2500000004 HC RX 250 GENERAL PHARMACY W/ HCPCS (ALT 636 FOR OP/ED): Mod: JZ | Performed by: UROLOGY

## 2025-05-16 PROCEDURE — 2550000001 HC RX 255 CONTRASTS: Performed by: UROLOGY

## 2025-05-16 PROCEDURE — C1769 GUIDE WIRE: HCPCS | Performed by: UROLOGY

## 2025-05-16 PROCEDURE — 3600000004 HC OR TIME - INITIAL BASE CHARGE - PROCEDURE LEVEL FOUR: Performed by: UROLOGY

## 2025-05-16 PROCEDURE — 2720000007 HC OR 272 NO HCPCS: Performed by: UROLOGY

## 2025-05-16 PROCEDURE — 52351 CYSTOURETERO & OR PYELOSCOPE: CPT | Performed by: UROLOGY

## 2025-05-16 PROCEDURE — C1758 CATHETER, URETERAL: HCPCS | Performed by: UROLOGY

## 2025-05-16 PROCEDURE — 3700000001 HC GENERAL ANESTHESIA TIME - INITIAL BASE CHARGE: Performed by: UROLOGY

## 2025-05-16 PROCEDURE — 7100000009 HC PHASE TWO TIME - INITIAL BASE CHARGE: Performed by: UROLOGY

## 2025-05-16 PROCEDURE — 2500000001 HC RX 250 WO HCPCS SELF ADMINISTERED DRUGS (ALT 637 FOR MEDICARE OP): Performed by: UROLOGY

## 2025-05-16 PROCEDURE — 7100000001 HC RECOVERY ROOM TIME - INITIAL BASE CHARGE: Performed by: UROLOGY

## 2025-05-16 PROCEDURE — 7100000002 HC RECOVERY ROOM TIME - EACH INCREMENTAL 1 MINUTE: Performed by: UROLOGY

## 2025-05-16 PROCEDURE — C1747 HC OR 272 NO HCPCS: HCPCS | Performed by: UROLOGY

## 2025-05-16 PROCEDURE — 74420 UROGRAPHY RTRGR +-KUB: CPT | Performed by: UROLOGY

## 2025-05-16 PROCEDURE — 3600000009 HC OR TIME - EACH INCREMENTAL 1 MINUTE - PROCEDURE LEVEL FOUR: Performed by: UROLOGY

## 2025-05-16 PROCEDURE — 74420 UROGRAPHY RTRGR +-KUB: CPT

## 2025-05-16 DEVICE — ENDOSCOPIC VALVE WITH ADAPTER.
Type: IMPLANTABLE DEVICE | Site: URETHRA | Status: NON-FUNCTIONAL
Brand: SURSEAL® II

## 2025-05-16 RX ORDER — DOCUSATE SODIUM 100 MG/1
200 CAPSULE, LIQUID FILLED ORAL 2 TIMES DAILY
Qty: 12 CAPSULE | Refills: 0 | Status: SHIPPED | OUTPATIENT
Start: 2025-05-16 | End: 2025-05-21 | Stop reason: HOSPADM

## 2025-05-16 RX ORDER — FUROSEMIDE 10 MG/ML
INJECTION INTRAMUSCULAR; INTRAVENOUS AS NEEDED
Status: DISCONTINUED | OUTPATIENT
Start: 2025-05-16 | End: 2025-05-16

## 2025-05-16 RX ORDER — SODIUM CHLORIDE, SODIUM LACTATE, POTASSIUM CHLORIDE, CALCIUM CHLORIDE 600; 310; 30; 20 MG/100ML; MG/100ML; MG/100ML; MG/100ML
50 INJECTION, SOLUTION INTRAVENOUS CONTINUOUS
Status: DISCONTINUED | OUTPATIENT
Start: 2025-05-16 | End: 2025-05-16 | Stop reason: HOSPADM

## 2025-05-16 RX ORDER — FENTANYL CITRATE 50 UG/ML
INJECTION, SOLUTION INTRAMUSCULAR; INTRAVENOUS AS NEEDED
Status: DISCONTINUED | OUTPATIENT
Start: 2025-05-16 | End: 2025-05-16

## 2025-05-16 RX ORDER — MIDAZOLAM HYDROCHLORIDE 1 MG/ML
INJECTION, SOLUTION INTRAMUSCULAR; INTRAVENOUS AS NEEDED
Status: DISCONTINUED | OUTPATIENT
Start: 2025-05-16 | End: 2025-05-16

## 2025-05-16 RX ORDER — FENTANYL CITRATE 50 UG/ML
25 INJECTION, SOLUTION INTRAMUSCULAR; INTRAVENOUS EVERY 5 MIN PRN
Status: DISCONTINUED | OUTPATIENT
Start: 2025-05-16 | End: 2025-05-16 | Stop reason: HOSPADM

## 2025-05-16 RX ORDER — OXYCODONE HYDROCHLORIDE 5 MG/1
10 TABLET ORAL EVERY 4 HOURS PRN
Status: DISCONTINUED | OUTPATIENT
Start: 2025-05-16 | End: 2025-05-16 | Stop reason: HOSPADM

## 2025-05-16 RX ORDER — LIDOCAINE HYDROCHLORIDE 10 MG/ML
INJECTION, SOLUTION EPIDURAL; INFILTRATION; INTRACAUDAL; PERINEURAL AS NEEDED
Status: DISCONTINUED | OUTPATIENT
Start: 2025-05-16 | End: 2025-05-16

## 2025-05-16 RX ORDER — CIPROFLOXACIN 2 MG/ML
400 INJECTION, SOLUTION INTRAVENOUS ONCE
Status: COMPLETED | OUTPATIENT
Start: 2025-05-16 | End: 2025-05-16

## 2025-05-16 RX ORDER — ACETAMINOPHEN 325 MG/1
650 TABLET ORAL EVERY 4 HOURS PRN
Status: DISCONTINUED | OUTPATIENT
Start: 2025-05-16 | End: 2025-05-16 | Stop reason: HOSPADM

## 2025-05-16 RX ORDER — SODIUM CHLORIDE, SODIUM LACTATE, POTASSIUM CHLORIDE, CALCIUM CHLORIDE 600; 310; 30; 20 MG/100ML; MG/100ML; MG/100ML; MG/100ML
INJECTION, SOLUTION INTRAVENOUS CONTINUOUS PRN
Status: DISCONTINUED | OUTPATIENT
Start: 2025-05-16 | End: 2025-05-16

## 2025-05-16 RX ORDER — OXYCODONE HYDROCHLORIDE 5 MG/1
5 TABLET ORAL EVERY 4 HOURS PRN
Status: DISCONTINUED | OUTPATIENT
Start: 2025-05-16 | End: 2025-05-16 | Stop reason: HOSPADM

## 2025-05-16 RX ORDER — PHENAZOPYRIDINE HYDROCHLORIDE 200 MG/1
200 TABLET, FILM COATED ORAL 3 TIMES DAILY PRN
Qty: 10 TABLET | Refills: 0 | Status: SHIPPED | OUTPATIENT
Start: 2025-05-16 | End: 2025-05-17 | Stop reason: SDUPTHER

## 2025-05-16 RX ORDER — ACETAMINOPHEN 325 MG/1
975 TABLET ORAL ONCE
Status: COMPLETED | OUTPATIENT
Start: 2025-05-16 | End: 2025-05-16

## 2025-05-16 RX ORDER — ONDANSETRON HYDROCHLORIDE 2 MG/ML
4 INJECTION, SOLUTION INTRAVENOUS ONCE AS NEEDED
Status: DISCONTINUED | OUTPATIENT
Start: 2025-05-16 | End: 2025-05-16 | Stop reason: HOSPADM

## 2025-05-16 RX ORDER — FENTANYL CITRATE 50 UG/ML
50 INJECTION, SOLUTION INTRAMUSCULAR; INTRAVENOUS EVERY 5 MIN PRN
Status: DISCONTINUED | OUTPATIENT
Start: 2025-05-16 | End: 2025-05-16 | Stop reason: HOSPADM

## 2025-05-16 RX ORDER — ONDANSETRON HYDROCHLORIDE 2 MG/ML
INJECTION, SOLUTION INTRAVENOUS AS NEEDED
Status: DISCONTINUED | OUTPATIENT
Start: 2025-05-16 | End: 2025-05-16

## 2025-05-16 RX ORDER — ALBUTEROL SULFATE 0.83 MG/ML
2.5 SOLUTION RESPIRATORY (INHALATION) ONCE
Status: DISCONTINUED | OUTPATIENT
Start: 2025-05-16 | End: 2025-05-16 | Stop reason: HOSPADM

## 2025-05-16 RX ORDER — GABAPENTIN 300 MG/1
600 CAPSULE ORAL ONCE
Status: COMPLETED | OUTPATIENT
Start: 2025-05-16 | End: 2025-05-16

## 2025-05-16 RX ORDER — PROPOFOL 10 MG/ML
INJECTION, EMULSION INTRAVENOUS AS NEEDED
Status: DISCONTINUED | OUTPATIENT
Start: 2025-05-16 | End: 2025-05-16

## 2025-05-16 RX ORDER — SULFAMETHOXAZOLE AND TRIMETHOPRIM 800; 160 MG/1; MG/1
1 TABLET ORAL 2 TIMES DAILY
Qty: 2 TABLET | Refills: 0 | Status: SHIPPED | OUTPATIENT
Start: 2025-05-16 | End: 2025-05-21 | Stop reason: HOSPADM

## 2025-05-16 RX ORDER — LIDOCAINE HYDROCHLORIDE 10 MG/ML
0.1 INJECTION, SOLUTION EPIDURAL; INFILTRATION; INTRACAUDAL; PERINEURAL ONCE
Status: DISCONTINUED | OUTPATIENT
Start: 2025-05-16 | End: 2025-05-16 | Stop reason: HOSPADM

## 2025-05-16 RX ORDER — CELECOXIB 200 MG/1
400 CAPSULE ORAL ONCE
Status: COMPLETED | OUTPATIENT
Start: 2025-05-16 | End: 2025-05-16

## 2025-05-16 RX ORDER — ACETAMINOPHEN 325 MG/1
650 TABLET ORAL EVERY 6 HOURS PRN
Qty: 30 TABLET | Refills: 0 | Status: SHIPPED | OUTPATIENT
Start: 2025-05-16

## 2025-05-16 RX ORDER — LABETALOL HYDROCHLORIDE 5 MG/ML
5 INJECTION, SOLUTION INTRAVENOUS ONCE AS NEEDED
Status: DISCONTINUED | OUTPATIENT
Start: 2025-05-16 | End: 2025-05-16 | Stop reason: HOSPADM

## 2025-05-16 RX ORDER — MEPERIDINE HYDROCHLORIDE 25 MG/ML
12.5 INJECTION INTRAMUSCULAR; INTRAVENOUS; SUBCUTANEOUS EVERY 10 MIN PRN
Status: DISCONTINUED | OUTPATIENT
Start: 2025-05-16 | End: 2025-05-16 | Stop reason: HOSPADM

## 2025-05-16 RX ORDER — SULFAMETHOXAZOLE AND TRIMETHOPRIM 800; 160 MG/1; MG/1
1 TABLET ORAL 2 TIMES DAILY
Qty: 1 TABLET | Refills: 0 | Status: SHIPPED | OUTPATIENT
Start: 2025-05-16 | End: 2025-05-16

## 2025-05-16 RX ADMIN — FENTANYL CITRATE 100 MCG: 50 INJECTION, SOLUTION INTRAMUSCULAR; INTRAVENOUS at 13:39

## 2025-05-16 RX ADMIN — ACETAMINOPHEN 975 MG: 325 TABLET ORAL at 12:46

## 2025-05-16 RX ADMIN — CIPROFLOXACIN 400 MG: 2 INJECTION, SOLUTION INTRAVENOUS at 13:33

## 2025-05-16 RX ADMIN — MIDAZOLAM 2 MG: 1 INJECTION INTRAMUSCULAR; INTRAVENOUS at 13:33

## 2025-05-16 RX ADMIN — LIDOCAINE HYDROCHLORIDE 5 ML: 10 INJECTION, SOLUTION EPIDURAL; INFILTRATION; INTRACAUDAL; PERINEURAL at 13:38

## 2025-05-16 RX ADMIN — DEXAMETHASONE SODIUM PHOSPHATE 4 MG: 4 INJECTION, SOLUTION INTRAMUSCULAR; INTRAVENOUS at 13:47

## 2025-05-16 RX ADMIN — CELECOXIB 400 MG: 200 CAPSULE ORAL at 12:47

## 2025-05-16 RX ADMIN — PROPOFOL 200 MG: 10 INJECTION, EMULSION INTRAVENOUS at 13:38

## 2025-05-16 RX ADMIN — FUROSEMIDE 10 MG: 10 INJECTION, SOLUTION INTRAMUSCULAR; INTRAVENOUS at 14:03

## 2025-05-16 RX ADMIN — GABAPENTIN 600 MG: 300 CAPSULE ORAL at 12:47

## 2025-05-16 RX ADMIN — SODIUM CHLORIDE, POTASSIUM CHLORIDE, SODIUM LACTATE AND CALCIUM CHLORIDE: 600; 310; 30; 20 INJECTION, SOLUTION INTRAVENOUS at 13:33

## 2025-05-16 RX ADMIN — ONDANSETRON 4 MG: 2 INJECTION, SOLUTION INTRAMUSCULAR; INTRAVENOUS at 13:47

## 2025-05-16 ASSESSMENT — PAIN SCALES - GENERAL
PAINLEVEL_OUTOF10: 0 - NO PAIN

## 2025-05-16 ASSESSMENT — COLUMBIA-SUICIDE SEVERITY RATING SCALE - C-SSRS
1. IN THE PAST MONTH, HAVE YOU WISHED YOU WERE DEAD OR WISHED YOU COULD GO TO SLEEP AND NOT WAKE UP?: NO
6. HAVE YOU EVER DONE ANYTHING, STARTED TO DO ANYTHING, OR PREPARED TO DO ANYTHING TO END YOUR LIFE?: NO
2. HAVE YOU ACTUALLY HAD ANY THOUGHTS OF KILLING YOURSELF?: NO

## 2025-05-16 ASSESSMENT — PAIN - FUNCTIONAL ASSESSMENT
PAIN_FUNCTIONAL_ASSESSMENT: 0-10

## 2025-05-16 NOTE — ANESTHESIA POSTPROCEDURE EVALUATION
Patient: Sofya Dean    Procedure Summary       Date: 05/16/25 Room / Location: [facility] / [facility]    Anesthesia Start: 1333 Anesthesia Stop: 1415    Procedure: LEFT URETEROSCOPY, LEFT RETROGRADE PYELOGRAM (Left) Diagnosis:       Nephrolithiasis      Hydronephrosis, unspecified hydronephrosis type      (Nephrolithiasis [N20.0])      (Hydronephrosis, unspecified hydronephrosis type [N13.30])    Surgeons: Cristina Poe MD Responsible Provider: Abelardo Pedersen MD    Anesthesia Type: general ASA Status: 2            Anesthesia Type: general    Vitals Value Taken Time   /90 05/16/25 14:45   Temp 36.4 °C (97.5 °F) 05/16/25 14:45   Pulse 67 05/16/25 14:45   Resp 16 05/16/25 14:45   SpO2 98 % 05/16/25 14:45       Anesthesia Post Evaluation    Patient location during evaluation: PACU  Patient participation: complete - patient participated  Level of consciousness: awake  Pain management: adequate  Multimodal analgesia pain management approach  Airway patency: patent  Cardiovascular status: acceptable and hemodynamically stable  Respiratory status: acceptable and spontaneous ventilation  Hydration status: euvolemic  Postoperative Nausea and Vomiting: none        There were no known notable events for this encounter.

## 2025-05-16 NOTE — ANESTHESIA PROCEDURE NOTES
Airway  Date/Time: 5/16/2025 1:39 PM  Reason: elective    Airway not difficult    Staffing  Performed: CRNA   Authorized by: Abelardo Pedersen MD    Performed by: OLIMPIA Allred  Patient location during procedure: OR    Patient Condition  Indications for airway management: anesthesia  Patient position: sniffing  Sedation level: deep     Final Airway Details   Preoxygenated: yes  Final airway type: supraglottic airway  Successful airway: classic  Number of attempts at approach: 1

## 2025-05-16 NOTE — ANESTHESIA PREPROCEDURE EVALUATION
Patient: Sofya Dean    Procedure Information       Date/Time: 05/16/25 1230    Procedure: Ureteral Lithotripsy Laser (fluoro,holmium) (Left)    Location: [facility] / [facility]    Surgeons: Cristina Poe MD          Past Medical History:   Diagnosis Date    Abnormal Pap smear of cervix     Allergic DURICEF    Colon polyp     Difficult intubation 03/04/2024    Difficult mask: Oral airway and 2 hand required to BMV. Grade 3 view with mas. 2B with glidescope.    Epilepsy     Fractures     back, knee    Hyperlipidemia     Hypothyroidism     Nephrolithiasis     GLENIS (obstructive sleep apnea)     Pneumonia     Urinary tract infection     Varicella         Relevant Problems   Anesthesia   (+) Difficult intubation      Cardiac   (+) Hyperlipidemia      Neuro   (+) Seizure disorder (Multi)      /Renal   (+) Hydronephrosis   (+) Nephrolithiasis      Endocrine   (+) Hypothyroidism       Clinical information reviewed:   Tobacco  Allergies  Meds   Med Hx  Surg Hx  OB Status  Fam Hx  Soc   Hx        NPO Detail:  NPO/Void Status  Date of Last Liquid: 05/16/25  Time of Last Liquid: 0800 (water with medication)  Date of Last Solid: 05/15/25  Time of Last Solid: 2000  Last Intake Type: Clear fluids  Time of Last Void: 1200         Physical Exam    Airway  Mallampati: II  TM distance: >3 FB  Neck ROM: full     Cardiovascular    Dental    Pulmonary    Abdominal            Anesthesia Plan    History of general anesthesia?: yes  History of complications of general anesthesia?: no    ASA 2     general     intravenous induction   Anesthetic plan and risks discussed with patient.    Plan discussed with CRNA.      
Declines

## 2025-05-16 NOTE — OP NOTE
Cystoscopy, Left Retrograde Pyelogram with interpretation, left ureteral Catheterization, Left diagnostic Ureteroscopy      Date: 2025  OR Location: [facility]    Name: Sofya Dean, : 1960, Age: 64 y.o., MRN: [id number], Sex: female    Diagnosis  Pre-op Diagnosis      * Nephrolithiasis [N20.0]     * Hydronephrosis, unspecified hydronephrosis type [N13.30] Post-op Diagnosis     * Nephrolithiasis [N20.0]     * Hydronephrosis, unspecified hydronephrosis type [N13.30]     Procedures  CHG UROGRAPHY RETROGRADE WITH/WO KUB [12273]  OK CYSTOURETHROSCOPY W/URETERAL CATHETERIZATION [50607]  OK CYSTO/URETERO/PYELOSCOPY, DX [[id number]]  Surgeons      * Cristina Poe - Primary    Resident/Fellow/Other Assistant:  Surgeons and Role:     * Beth Onofre MD - Resident - Assisting    Staff:   Circulator: Helena Huggins Person: Estella    Anesthesia Staff: Anesthesiologist: David Juan MD; Abelardo Pedersen MD  CRNA: JOURDAN Allred-CRNA    Procedure Summary  Anesthesia: General  ASA: II  Estimated Blood Loss: 1 mL  Intra-op Medications:   Administrations occurring from 1230 to 1350 on 25:   Medication Name Total Dose   acetaminophen (Tylenol) tablet 975 mg 975 mg   celecoxib (CeleBREX) capsule 400 mg 400 mg   gabapentin (Neurontin) capsule 600 mg 600 mg              Anesthesia Record               Intraprocedure I/O Totals       None           Specimen: No specimens collected           Findings: no stone visualized in left collecting system    Indications: Sofya Dean is an 64 y.o. female who is having surgery for Nephrolithiasis [N20.0]  Hydronephrosis, unspecified hydronephrosis type [N13.30]. Patient has a historyof nephrolithiasis and underwent right URS w/ LL in 2024. She was seen for follow-up and found to have a left sided ureteral stone which did not pass with MET.     The patient was seen in the preoperative area. The risks, benefits, complications, treatment options, non-operative  alternatives, expected recovery and outcomes were discussed with the patient. The possibilities of reaction to medication, pulmonary aspiration, injury to surrounding structures, bleeding, recurrent infection, the need for additional procedures, failure to diagnose a condition, and creating a complication requiring transfusion or operation were discussed with the patient. The patient concurred with the proposed plan, giving informed consent.  The site of surgery was properly noted/marked if necessary per policy. The patient has been actively warmed in preoperative area. Preoperative antibiotics have been ordered and given within 1 hours of incision. Venous thrombosis prophylaxis have been ordered including bilateral sequential compression devices    Procedure Details: Patient consented to procedure in preoperative area. Risks and benefits discussed. Allergies were reviewed and preoperative antibiotics were administered with ciprofloxacin. Patient was brought to the operating room and placed in supine position on the operating room table. A timeout was performed. All were in agreement.     Patient underwent general anesthesia without complication. They were repositioned in dorsal lithotomy and prepped and draped in the usual sterile fashion. A 21 Fr rigid cystoscope was used to perform cystourethroscopy. Urethra was normal. The bladder mucosa was unremarkable and the ureteral orifices were in normal orthotopic position. No masses or stones were identified.     The left ureter was cannulated with a Sensor wire and ureteral catheter. A retrograde pyelogram was performed with findings as follows: ureter with normal course and caliber, no hydronephrosis or filling defects.  The catheter was then removed and a semirigid ureteroscope was inserted alongside the wire. There were no stones visualized within the ureter up to the level of the UPJ. We then advanced a super stiff wire into the ureter and advanced a flexible  ureteroscope over the wire. Pan pyeloscopy was performed and with the assistance of a pyelogram, demonstrated no stones. The ureteroscope was carefully backed out of the ureter, ensuring no stones were in the ureter.      All instruments were removed. This concluded the procedure.     Evidence of Infection: No   Complications:  None; patient tolerated the procedure well.    Disposition: PACU - hemodynamically stable.  Condition: stable                 Additional Details: Patient will follow up in 3 months with a renal US and KUB    Attending Attestation: I was present and scrubbed for the entire procedure.    Cristina Poe  Phone Number: [phone]

## 2025-05-16 NOTE — DISCHARGE INSTRUCTIONS
Post-Operative Instructions:  Cystoscopy, Ureteroscopy, and Stent Placement/Exchange    Medications  Tylenol and/or Motrin may be given to help with generalized discomfort after surgery. You may alternate these so that one is taken every 6 hours.  Take Pyridium as needed for burning with urination. This may cause urine to turn orange or red.   Keep taking any medications that you have been on for urination.   You will have one dose of antibiotics to take tonight.   If you are on blood thinners - usually these can be restarted in 2-3 days if your urine is clear or pink, unless the doctor tells you otherwise. You may continue taking Aspirin immediately.      Activity  You may resume your normal diet. You should stick to fluids and bland foods at first, as you may be nauseous after surgery.   Make sure to drink plenty of water and stay hydrated.  You may shower immediately after surgery.   There are no activity restrictions.     Things to expect after surgery  Blood in urine for several days with passage of small clots or bits of stone (if a stone was treated), which may persist as long as the stent is in place.   Urgency and frequency of urination caused by surgery and the stent. Some people may experience leakage of urine.   Pain and discomfort in the side that was operated on.   If you have a stent on a string - this will be removed at home   You may remove this at home when the surgeon says it is OK. This is usually in about 3-7 days from surgery. To remove the stent, remove the tape over the strings. Pull the strings firmly until the entire stent is removed. Make sure that you see a curl on each end of the stent.   If you have a stent completely on the inside - this will be removed in the office  This stent will be removed at a follow-up appointment using a small flexible camera.  This is usually 1-4 weeks from surgery depending on how much swelling was in the kidney and ureter.    When to call the surgeon:  Fever higher than 102?F or shaking chills  Repeated vomiting with inability to keep down fluids  Severe pain not controlled with medication  Inability to urinate for more than 8 hours  For questions or problems, call our office [phone]  Evenings and weekends: call the hospital  [phone] and ask for the urology resident on-call.  In case of emergency, call [phone].    Follow-up appointment:    You will receive a phone call with your follow-up appointment details if one has not already been scheduled for you. Please call [phone] if you need to reschedule.   You will follow-up in 3 months with Dr. Poe with an X-ray and ultrasound beforehand.

## 2025-05-17 ENCOUNTER — TELEPHONE (OUTPATIENT)
Dept: UROLOGY | Facility: HOSPITAL | Age: 65
End: 2025-05-17
Payer: MEDICARE

## 2025-05-17 DIAGNOSIS — N20.0 URINARY TRACT OBSTRUCTION BY KIDNEY STONE: ICD-10-CM

## 2025-05-17 DIAGNOSIS — N13.8 URINARY TRACT OBSTRUCTION BY KIDNEY STONE: ICD-10-CM

## 2025-05-17 DIAGNOSIS — N20.0 NEPHROLITHIASIS: Primary | ICD-10-CM

## 2025-05-17 RX ORDER — TAMSULOSIN HYDROCHLORIDE 0.4 MG/1
0.4 CAPSULE ORAL DAILY
Qty: 30 CAPSULE | Refills: 0 | Status: SHIPPED | OUTPATIENT
Start: 2025-05-17 | End: 2025-08-15

## 2025-05-17 RX ORDER — ONDANSETRON 4 MG/1
4 TABLET, FILM COATED ORAL EVERY 8 HOURS PRN
Qty: 20 TABLET | Refills: 0 | Status: SHIPPED | OUTPATIENT
Start: 2025-05-17 | End: 2025-05-24

## 2025-05-17 RX ORDER — PHENAZOPYRIDINE HYDROCHLORIDE 200 MG/1
200 TABLET, FILM COATED ORAL 3 TIMES DAILY PRN
Qty: 10 TABLET | Refills: 0 | Status: SHIPPED | OUTPATIENT
Start: 2025-05-17

## 2025-05-17 RX ORDER — METHOCARBAMOL 750 MG/1
750 TABLET, FILM COATED ORAL 4 TIMES DAILY
Qty: 40 TABLET | Refills: 0 | Status: SHIPPED | OUTPATIENT
Start: 2025-05-17 | End: 2025-05-27

## 2025-05-17 NOTE — PROGRESS NOTES
Patient called regarding back pain and intractable nausea and vomiting that occurred over last night after ureteroscopy 5/16.  Patient reported that the pain currently is well-managed however it waxes and wanes.  Denies fever, chills or any changes in urinary patterns including urinary urgency, frequency dysuria.  Discussed likely ureteral spasm and will prescribe medications to help. If pain returns and is refractory to pain medications or if patient develops any fever, chills that should she would need to be seen in the emergency room for evaluation.    Amanda Rasheed MD   [facility]  Adult Urology Pager: [phone]  Pediatric Urology Pager: [phone]

## 2025-05-17 NOTE — TELEPHONE ENCOUNTER
Received telehealth page regarding severe flank pain with nausea and vomiting overnight after recent ureteroscopy.  Patient reported having 10 out of 10 pain last night with some nausea and vomiting however symptoms have recovered this morning.  Patient notes not being able to drink much water at the time but has been able to increase p.o. intake since then.  Patient denies fever, chills, nausea, vomiting at this time and notes that pain is well-controlled.  Discussed likely ureteral spasm as etiology of pain and send with Flomax, muscle relaxer and encouraged high fluid intake.  Gave red flag signs of nausea, vomiting, fever, chills and worsening pain that is refractory to pain medications as indications for going into the emergency room.    Amanda Rasheed MD   [facility]  Adult Urology Pager: [phone]  Pediatric Urology Pager: [phone]      Sent to provider to advise?

## 2025-05-19 ENCOUNTER — TELEPHONE (OUTPATIENT)
Dept: UROLOGY | Facility: HOSPITAL | Age: 65
End: 2025-05-19

## 2025-05-19 ENCOUNTER — TELEPHONE (OUTPATIENT)
Dept: UROLOGY | Facility: HOSPITAL | Age: 65
End: 2025-05-19
Payer: MEDICARE

## 2025-05-19 ENCOUNTER — APPOINTMENT (OUTPATIENT)
Dept: RADIOLOGY | Facility: HOSPITAL | Age: 65
DRG: 690 | End: 2025-05-19
Payer: MEDICARE

## 2025-05-19 ENCOUNTER — HOSPITAL ENCOUNTER (INPATIENT)
Facility: HOSPITAL | Age: 65
LOS: 1 days | Discharge: HOME | DRG: 690 | End: 2025-05-21
Attending: STUDENT IN AN ORGANIZED HEALTH CARE EDUCATION/TRAINING PROGRAM | Admitting: INTERNAL MEDICINE
Payer: MEDICARE

## 2025-05-19 ENCOUNTER — TELEMEDICINE (OUTPATIENT)
Dept: UROLOGY | Facility: HOSPITAL | Age: 65
End: 2025-05-19
Payer: MEDICARE

## 2025-05-19 DIAGNOSIS — N13.30 HYDRONEPHROSIS, UNSPECIFIED HYDRONEPHROSIS TYPE: ICD-10-CM

## 2025-05-19 DIAGNOSIS — N13.30 HYDRONEPHROSIS OF LEFT KIDNEY: Primary | ICD-10-CM

## 2025-05-19 DIAGNOSIS — N20.0 NEPHROLITHIASIS: Primary | ICD-10-CM

## 2025-05-19 DIAGNOSIS — R10.9 FLANK PAIN: ICD-10-CM

## 2025-05-19 DIAGNOSIS — N39.0 URINARY TRACT INFECTION WITHOUT HEMATURIA, SITE UNSPECIFIED: ICD-10-CM

## 2025-05-19 LAB
ABO GROUP (TYPE) IN BLOOD: NORMAL
ALBUMIN SERPL BCP-MCNC: 4.5 G/DL (ref 3.4–5)
ALP SERPL-CCNC: 51 U/L (ref 33–136)
ALT SERPL W P-5'-P-CCNC: 13 U/L (ref 7–45)
ANION GAP SERPL CALC-SCNC: 13 MMOL/L (ref 10–20)
ANTIBODY SCREEN: NORMAL
APPEARANCE UR: CLEAR
APTT PPP: 28 SECONDS (ref 26–36)
AST SERPL W P-5'-P-CCNC: 12 U/L (ref 9–39)
BASOPHILS # BLD AUTO: 0.03 X10*3/UL (ref 0–0.1)
BASOPHILS NFR BLD AUTO: 0.3 %
BILIRUB SERPL-MCNC: 0.4 MG/DL (ref 0–1.2)
BILIRUB UR STRIP.AUTO-MCNC: NEGATIVE MG/DL
BUN SERPL-MCNC: 16 MG/DL (ref 6–23)
CALCIUM SERPL-MCNC: 10.2 MG/DL (ref 8.6–10.3)
CARDIAC TROPONIN I PNL SERPL HS: 9 NG/L (ref 0–13)
CHLORIDE SERPL-SCNC: 100 MMOL/L (ref 98–107)
CO2 SERPL-SCNC: 28 MMOL/L (ref 21–32)
COLOR UR: ABNORMAL
CREAT SERPL-MCNC: 1.56 MG/DL (ref 0.5–1.05)
EGFRCR SERPLBLD CKD-EPI 2021: 37 ML/MIN/1.73M*2
EOSINOPHIL # BLD AUTO: 0.09 X10*3/UL (ref 0–0.7)
EOSINOPHIL NFR BLD AUTO: 0.9 %
ERYTHROCYTE [DISTWIDTH] IN BLOOD BY AUTOMATED COUNT: 13.6 % (ref 11.5–14.5)
GLUCOSE SERPL-MCNC: 95 MG/DL (ref 74–99)
GLUCOSE UR STRIP.AUTO-MCNC: NORMAL MG/DL
HCT VFR BLD AUTO: 39.3 % (ref 36–46)
HGB BLD-MCNC: 13.1 G/DL (ref 12–16)
IMM GRANULOCYTES # BLD AUTO: 0.04 X10*3/UL (ref 0–0.7)
IMM GRANULOCYTES NFR BLD AUTO: 0.4 % (ref 0–0.9)
INR PPP: 1 (ref 0.9–1.1)
KETONES UR STRIP.AUTO-MCNC: NEGATIVE MG/DL
LACTATE SERPL-SCNC: 0.9 MMOL/L (ref 0.4–2)
LEUKOCYTE ESTERASE UR QL STRIP.AUTO: ABNORMAL
LIPASE SERPL-CCNC: 19 U/L (ref 9–82)
LYMPHOCYTES # BLD AUTO: 3.18 X10*3/UL (ref 1.2–4.8)
LYMPHOCYTES NFR BLD AUTO: 31.5 %
MAGNESIUM SERPL-MCNC: 1.96 MG/DL (ref 1.6–2.4)
MCH RBC QN AUTO: 29.8 PG (ref 26–34)
MCHC RBC AUTO-ENTMCNC: 33.3 G/DL (ref 32–36)
MCV RBC AUTO: 89 FL (ref 80–100)
MONOCYTES # BLD AUTO: 0.79 X10*3/UL (ref 0.1–1)
MONOCYTES NFR BLD AUTO: 7.8 %
NEUTROPHILS # BLD AUTO: 5.97 X10*3/UL (ref 1.2–7.7)
NEUTROPHILS NFR BLD AUTO: 59.1 %
NITRITE UR QL STRIP.AUTO: NEGATIVE
NRBC BLD-RTO: 0 /100 WBCS (ref 0–0)
PH UR STRIP.AUTO: 7.5 [PH]
PLATELET # BLD AUTO: 207 X10*3/UL (ref 150–450)
POTASSIUM SERPL-SCNC: 4.3 MMOL/L (ref 3.5–5.3)
PROT SERPL-MCNC: 7.6 G/DL (ref 6.4–8.2)
PROT UR STRIP.AUTO-MCNC: ABNORMAL MG/DL
PROTHROMBIN TIME: 11.3 SECONDS (ref 9.8–12.4)
RBC # BLD AUTO: 4.4 X10*6/UL (ref 4–5.2)
RBC # UR STRIP.AUTO: ABNORMAL MG/DL
RBC #/AREA URNS AUTO: >20 /HPF
RH FACTOR (ANTIGEN D): NORMAL
SODIUM SERPL-SCNC: 137 MMOL/L (ref 136–145)
SP GR UR STRIP.AUTO: 1
UROBILINOGEN UR STRIP.AUTO-MCNC: NORMAL MG/DL
WBC # BLD AUTO: 10.1 X10*3/UL (ref 4.4–11.3)
WBC #/AREA URNS AUTO: ABNORMAL /HPF

## 2025-05-19 PROCEDURE — 84484 ASSAY OF TROPONIN QUANT: CPT | Performed by: INTERNAL MEDICINE

## 2025-05-19 PROCEDURE — 83690 ASSAY OF LIPASE: CPT | Performed by: INTERNAL MEDICINE

## 2025-05-19 PROCEDURE — 83735 ASSAY OF MAGNESIUM: CPT | Performed by: INTERNAL MEDICINE

## 2025-05-19 PROCEDURE — 84075 ASSAY ALKALINE PHOSPHATASE: CPT | Performed by: INTERNAL MEDICINE

## 2025-05-19 PROCEDURE — 85610 PROTHROMBIN TIME: CPT | Performed by: INTERNAL MEDICINE

## 2025-05-19 PROCEDURE — 85730 THROMBOPLASTIN TIME PARTIAL: CPT | Performed by: INTERNAL MEDICINE

## 2025-05-19 PROCEDURE — 76770 US EXAM ABDO BACK WALL COMP: CPT

## 2025-05-19 PROCEDURE — G2211 COMPLEX E/M VISIT ADD ON: HCPCS | Performed by: UROLOGY

## 2025-05-19 PROCEDURE — 96375 TX/PRO/DX INJ NEW DRUG ADDON: CPT

## 2025-05-19 PROCEDURE — 99285 EMERGENCY DEPT VISIT HI MDM: CPT | Mod: 25 | Performed by: STUDENT IN AN ORGANIZED HEALTH CARE EDUCATION/TRAINING PROGRAM

## 2025-05-19 PROCEDURE — 85025 COMPLETE CBC W/AUTO DIFF WBC: CPT | Performed by: INTERNAL MEDICINE

## 2025-05-19 PROCEDURE — 99213 OFFICE O/P EST LOW 20 MIN: CPT | Performed by: UROLOGY

## 2025-05-19 PROCEDURE — 2500000001 HC RX 250 WO HCPCS SELF ADMINISTERED DRUGS (ALT 637 FOR MEDICARE OP): Performed by: EMERGENCY MEDICINE

## 2025-05-19 PROCEDURE — 86900 BLOOD TYPING SEROLOGIC ABO: CPT | Performed by: INTERNAL MEDICINE

## 2025-05-19 PROCEDURE — 81001 URINALYSIS AUTO W/SCOPE: CPT | Performed by: INTERNAL MEDICINE

## 2025-05-19 PROCEDURE — 76770 US EXAM ABDO BACK WALL COMP: CPT | Performed by: RADIOLOGY

## 2025-05-19 PROCEDURE — 80053 COMPREHEN METABOLIC PANEL: CPT | Performed by: INTERNAL MEDICINE

## 2025-05-19 PROCEDURE — 83605 ASSAY OF LACTIC ACID: CPT | Performed by: INTERNAL MEDICINE

## 2025-05-19 PROCEDURE — 36415 COLL VENOUS BLD VENIPUNCTURE: CPT | Performed by: INTERNAL MEDICINE

## 2025-05-19 PROCEDURE — 87086 URINE CULTURE/COLONY COUNT: CPT | Mod: AHULAB | Performed by: INTERNAL MEDICINE

## 2025-05-19 PROCEDURE — 2500000004 HC RX 250 GENERAL PHARMACY W/ HCPCS (ALT 636 FOR OP/ED): Mod: JZ | Performed by: INTERNAL MEDICINE

## 2025-05-19 PROCEDURE — 5A09357 ASSISTANCE WITH RESPIRATORY VENTILATION, LESS THAN 24 CONSECUTIVE HOURS, CONTINUOUS POSITIVE AIRWAY PRESSURE: ICD-10-PCS | Performed by: INTERNAL MEDICINE

## 2025-05-19 RX ORDER — KETOROLAC TROMETHAMINE 30 MG/ML
30 INJECTION, SOLUTION INTRAMUSCULAR; INTRAVENOUS ONCE
Status: COMPLETED | OUTPATIENT
Start: 2025-05-19 | End: 2025-05-19

## 2025-05-19 RX ORDER — DIVALPROEX SODIUM 250 MG/1
250 TABLET, FILM COATED, EXTENDED RELEASE ORAL ONCE
Status: COMPLETED | OUTPATIENT
Start: 2025-05-19 | End: 2025-05-19

## 2025-05-19 RX ORDER — ONDANSETRON HYDROCHLORIDE 2 MG/ML
4 INJECTION, SOLUTION INTRAVENOUS ONCE
Status: COMPLETED | OUTPATIENT
Start: 2025-05-19 | End: 2025-05-19

## 2025-05-19 RX ADMIN — DIVALPROEX SODIUM 250 MG: 250 TABLET, FILM COATED, EXTENDED RELEASE ORAL at 23:08

## 2025-05-19 RX ADMIN — ONDANSETRON 4 MG: 2 INJECTION, SOLUTION INTRAMUSCULAR; INTRAVENOUS at 23:08

## 2025-05-19 RX ADMIN — KETOROLAC TROMETHAMINE 30 MG: 30 INJECTION, SOLUTION INTRAMUSCULAR at 23:07

## 2025-05-19 ASSESSMENT — PAIN SCALES - GENERAL
PAINLEVEL_OUTOF10: 3
PAINLEVEL_OUTOF10: 3
PAINLEVEL_OUTOF10: 4

## 2025-05-19 ASSESSMENT — PAIN DESCRIPTION - PROGRESSION: CLINICAL_PROGRESSION: GRADUALLY WORSENING

## 2025-05-19 ASSESSMENT — COLUMBIA-SUICIDE SEVERITY RATING SCALE - C-SSRS
2. HAVE YOU ACTUALLY HAD ANY THOUGHTS OF KILLING YOURSELF?: NO
6. HAVE YOU EVER DONE ANYTHING, STARTED TO DO ANYTHING, OR PREPARED TO DO ANYTHING TO END YOUR LIFE?: NO
1. IN THE PAST MONTH, HAVE YOU WISHED YOU WERE DEAD OR WISHED YOU COULD GO TO SLEEP AND NOT WAKE UP?: NO

## 2025-05-19 ASSESSMENT — PAIN DESCRIPTION - LOCATION: LOCATION: BACK

## 2025-05-19 ASSESSMENT — PAIN DESCRIPTION - PAIN TYPE: TYPE: ACUTE PAIN

## 2025-05-19 ASSESSMENT — PAIN DESCRIPTION - ORIENTATION: ORIENTATION: LEFT;LOWER

## 2025-05-19 ASSESSMENT — PAIN DESCRIPTION - FREQUENCY: FREQUENCY: CONSTANT/CONTINUOUS

## 2025-05-19 ASSESSMENT — PAIN DESCRIPTION - ONSET: ONSET: ONGOING

## 2025-05-19 ASSESSMENT — PAIN DESCRIPTION - DESCRIPTORS: DESCRIPTORS: STABBING

## 2025-05-19 ASSESSMENT — PAIN - FUNCTIONAL ASSESSMENT: PAIN_FUNCTIONAL_ASSESSMENT: 0-10

## 2025-05-19 NOTE — TELEPHONE ENCOUNTER
64yo F with PMH nephrolithiasis s/p diagnositic URS (left) 5/16 with Dr. Poe paged emergency pager this evening due to 3d of persistent flank pain, decreased appetite, lethargy, and now new onset gross hematuria. Of note, patient with telehealth urology appointment earlier today where symptoms were discussed and plan was made for outpatient CBC, BMP, Urine culture, and RBUS however this was prior to hematuria and worsening symptoms of malaise throughout the rest of the day which are unchanged by tylenol, pyridium, robaxin.    -In light of new, worsening symptoms, recommend presentation to nearest ED for further assessment and workup. Patient voices understanding and is amenable to this plan.    Leida Mcgee MD  PGY4 Urology  [phone]

## 2025-05-19 NOTE — TELEPHONE ENCOUNTER
Patient left VM on nurse line for Dr. Poe's office. Call returned, patient reporting nausea, and pain post op from procedure on Friday 5/16. Appointment made for 3:30 today with Dr. Poe. Patient voices understanding.

## 2025-05-19 NOTE — PROGRESS NOTES
Virtual or Telephone Consent    An interactive audio and video telecommunication system which permits real time communications between the patient (at the originating site) and provider (at the distant site) was utilized to provide this telehealth service.   Patient provided consent    Last visit 6/16/25  URS, stone passed, no stent    Today's visit:  Cystoscopy, Left Retrograde Pyelogram with interpretation, left ureteral Catheterization, Left diagnostic Ureteroscopy     Reports nausea / vomiting  Called in, was prescribed some meds  No fevers/chills  Minimal burning  Reports flank pain    Labs      Lab Results   Component Value Date    HGBA1C 5.3 01/21/2025     Lab Results   Component Value Date    HCT 42.6 04/10/2025           PMH:  Medical History[1]     PSH:  Surgical History[2]     Medications:  Current Medications[3]    Allergy:  Allergies[4]     Exam  CONSTITUTIONAL:        No acute distress    HEAD:        Normocephalic and atraumatic    CHEST / RESPIRATORY      no excess work of breathing, no respiratory distress,    ABDOMEN / GASTROINTESTINAL:        Abdomen nondistended          Assessment/Plan  Flank pain after diagnostic URS, improving  Stat labs and imaging as below  Discussed warning signs in detail  Renal US  Urine cx  Cbc/chem7    G 2211  Visit complexity inherent to evaluation and management (E&M) associated with medical care services that serve as the continuing focal point for all needed health care services and/or with medical care services that are part of ongoing care related to a patient's single, serious condition or a complex condition.           [1]   Past Medical History:  Diagnosis Date    Abnormal Pap smear of cervix     Allergic DURICEF    Colon polyp     Difficult intubation 03/04/2024    Difficult mask: Oral airway and 2 hand required to BMV. Grade 3 view with mas. 2B with glidescope.    Epilepsy     Fractures     back, knee    Hyperlipidemia     Hypothyroidism      Nephrolithiasis     GLENIS (obstructive sleep apnea)     Pneumonia     Urinary tract infection     Varicella    [2]   Past Surgical History:  Procedure Laterality Date    AUGMENTATION MAMMAPLASTY  3-4-24    CERVICAL BIOPSY  W/ LOOP ELECTRODE EXCISION      COLONOSCOPY      COLONOSCOPY W/ POLYPECTOMY      COSMETIC SURGERY  IMPLANTS    Remote Breast augmentation/implants. Implant replacement 03/04/2024.    CYSTOSCOPY  december and january    ENDOMETRIAL ABLATION      EYE SURGERY      KIDNEY STONE SURGERY  dec. 5, 2024    TONSILLECTOMY     [3]   Current Outpatient Medications:     acetaminophen (Tylenol) 325 mg tablet, Take 2 tablets (650 mg) by mouth every 6 hours if needed for mild pain (1 - 3)., Disp: 30 tablet, Rfl: 0    cholecalciferol (Vitamin D-3) 50 mcg (2,000 units) tablet, Take 1 tablet (50 mcg) by mouth once daily at bedtime., Disp: , Rfl:     co-enzyme Q-10 30 mg capsule, Take 1 capsule (30 mg) by mouth once daily in the morning., Disp: , Rfl:     cyanocobalamin (Vitamin B-12) 1,000 mcg tablet, Take 1 tablet (1,000 mcg) by mouth once daily at bedtime., Disp: , Rfl:     divalproex (Depakote ER) 250 mg 24 hr tablet, Take 2 tablets (500 mg) by mouth once daily in the morning AND 1 tablet (250 mg) once daily in the evening. Do not crush, chew, or split.., Disp: 270 tablet, Rfl: 3    docusate sodium (Colace) 100 mg capsule, Take 2 capsules (200 mg) by mouth 2 times a day for 3 days., Disp: 12 capsule, Rfl: 0    estradiol (Estrace) 0.01 % (0.1 mg/gram) vaginal cream, First 2 weeks: Insert 1/2 gram into the vagina nightly. After initial 2 weeks: Insert 1/2 gram into the vagina twice weekly, Disp: 42.5 g, Rfl: 5    krill oil 500 mg capsule, Take by mouth once daily at bedtime., Disp: , Rfl:     magnesium oxide (Mag-Ox) 400 mg tablet, Take 1 tablet (400 mg) by mouth once daily. (Patient taking differently: Take 1 tablet (400 mg) by mouth once daily at bedtime.), Disp: 90 tablet, Rfl: 3    methocarbamol (Robaxin) 750 mg  tablet, Take 1 tablet (750 mg) by mouth 4 times a day for 10 days., Disp: 40 tablet, Rfl: 0    multivitamin tablet, Take 1 tablet by mouth once daily at bedtime., Disp: , Rfl:     ondansetron (Zofran) 4 mg tablet, Take 1 tablet (4 mg) by mouth every 8 hours if needed for nausea or vomiting for up to 7 days., Disp: 20 tablet, Rfl: 0    phenazopyridine (Pyridium) 200 mg tablet, Take 1 tablet (200 mg) by mouth 3 times a day as needed for bladder spasms., Disp: 10 tablet, Rfl: 0    rosuvastatin (Crestor) 10 mg tablet, Take 1 tablet (10 mg) by mouth once daily. (Patient taking differently: Take 1 tablet (10 mg) by mouth once daily. 4-22-25 pt reports she only takes one tablet every Monday morning), Disp: 100 tablet, Rfl: 3    tamsulosin (Flomax) 0.4 mg 24 hr capsule, Take 1 capsule (0.4 mg) by mouth once daily., Disp: 30 capsule, Rfl: 0  [4]   Allergies  Allergen Reactions    Chlorpheniramine-Pseudoephed Hives    Iodine Hives and Swelling    Cefadroxil Itching and Rash

## 2025-05-20 ENCOUNTER — APPOINTMENT (OUTPATIENT)
Dept: CARDIOLOGY | Facility: HOSPITAL | Age: 65
DRG: 690 | End: 2025-05-20
Payer: MEDICARE

## 2025-05-20 ENCOUNTER — APPOINTMENT (OUTPATIENT)
Dept: RADIOLOGY | Facility: HOSPITAL | Age: 65
DRG: 690 | End: 2025-05-20
Payer: MEDICARE

## 2025-05-20 PROBLEM — N13.30 HYDRONEPHROSIS OF LEFT KIDNEY: Status: ACTIVE | Noted: 2025-05-20

## 2025-05-20 PROBLEM — N39.0 UTI (URINARY TRACT INFECTION): Status: ACTIVE | Noted: 2025-05-20

## 2025-05-20 LAB
ALBUMIN SERPL BCP-MCNC: 3.9 G/DL (ref 3.4–5)
ALP SERPL-CCNC: 43 U/L (ref 33–136)
ALT SERPL W P-5'-P-CCNC: 10 U/L (ref 7–45)
ANION GAP SERPL CALC-SCNC: 12 MMOL/L (ref 10–20)
ANION GAP SERPL CALC-SCNC: 14 MMOL/L (ref 10–20)
AST SERPL W P-5'-P-CCNC: 12 U/L (ref 9–39)
ATRIAL RATE: 78 BPM
BILIRUB SERPL-MCNC: 0.4 MG/DL (ref 0–1.2)
BUN SERPL-MCNC: 20 MG/DL (ref 6–23)
BUN SERPL-MCNC: 21 MG/DL (ref 6–23)
CALCIUM SERPL-MCNC: 9 MG/DL (ref 8.6–10.3)
CALCIUM SERPL-MCNC: 9.3 MG/DL (ref 8.6–10.3)
CARDIAC TROPONIN I PNL SERPL HS: 9 NG/L (ref 0–13)
CHLORIDE SERPL-SCNC: 106 MMOL/L (ref 98–107)
CHLORIDE SERPL-SCNC: 107 MMOL/L (ref 98–107)
CO2 SERPL-SCNC: 23 MMOL/L (ref 21–32)
CO2 SERPL-SCNC: 26 MMOL/L (ref 21–32)
CREAT SERPL-MCNC: 1.64 MG/DL (ref 0.5–1.05)
CREAT SERPL-MCNC: 1.65 MG/DL (ref 0.5–1.05)
CRP SERPL-MCNC: 9.96 MG/DL
EGFRCR SERPLBLD CKD-EPI 2021: 34 ML/MIN/1.73M*2
EGFRCR SERPLBLD CKD-EPI 2021: 34 ML/MIN/1.73M*2
ERYTHROCYTE [DISTWIDTH] IN BLOOD BY AUTOMATED COUNT: 13.9 % (ref 11.5–14.5)
GLUCOSE SERPL-MCNC: 105 MG/DL (ref 74–99)
GLUCOSE SERPL-MCNC: 82 MG/DL (ref 74–99)
HCT VFR BLD AUTO: 35.8 % (ref 36–46)
HGB BLD-MCNC: 11.7 G/DL (ref 12–16)
MCH RBC QN AUTO: 29.7 PG (ref 26–34)
MCHC RBC AUTO-ENTMCNC: 32.7 G/DL (ref 32–36)
MCV RBC AUTO: 91 FL (ref 80–100)
NRBC BLD-RTO: 0 /100 WBCS (ref 0–0)
P AXIS: 85 DEGREES
P OFFSET: 192 MS
P ONSET: 144 MS
PLATELET # BLD AUTO: 185 X10*3/UL (ref 150–450)
POTASSIUM SERPL-SCNC: 4.2 MMOL/L (ref 3.5–5.3)
POTASSIUM SERPL-SCNC: 4.5 MMOL/L (ref 3.5–5.3)
PR INTERVAL: 150 MS
PROT SERPL-MCNC: 6.5 G/DL (ref 6.4–8.2)
Q ONSET: 219 MS
QRS COUNT: 13 BEATS
QRS DURATION: 90 MS
QT INTERVAL: 384 MS
QTC CALCULATION(BAZETT): 437 MS
QTC FREDERICIA: 419 MS
R AXIS: 83 DEGREES
RBC # BLD AUTO: 3.94 X10*6/UL (ref 4–5.2)
SODIUM SERPL-SCNC: 139 MMOL/L (ref 136–145)
SODIUM SERPL-SCNC: 140 MMOL/L (ref 136–145)
T AXIS: 76 DEGREES
T OFFSET: 411 MS
VENTRICULAR RATE: 78 BPM
WBC # BLD AUTO: 7.6 X10*3/UL (ref 4.4–11.3)

## 2025-05-20 PROCEDURE — 2500000004 HC RX 250 GENERAL PHARMACY W/ HCPCS (ALT 636 FOR OP/ED): Mod: JZ | Performed by: NURSE PRACTITIONER

## 2025-05-20 PROCEDURE — 74176 CT ABD & PELVIS W/O CONTRAST: CPT

## 2025-05-20 PROCEDURE — 99222 1ST HOSP IP/OBS MODERATE 55: CPT

## 2025-05-20 PROCEDURE — 96361 HYDRATE IV INFUSION ADD-ON: CPT

## 2025-05-20 PROCEDURE — 2500000004 HC RX 250 GENERAL PHARMACY W/ HCPCS (ALT 636 FOR OP/ED): Mod: JZ | Performed by: INTERNAL MEDICINE

## 2025-05-20 PROCEDURE — 80048 BASIC METABOLIC PNL TOTAL CA: CPT | Mod: CCI | Performed by: NURSE PRACTITIONER

## 2025-05-20 PROCEDURE — 84075 ASSAY ALKALINE PHOSPHATASE: CPT

## 2025-05-20 PROCEDURE — 96365 THER/PROPH/DIAG IV INF INIT: CPT

## 2025-05-20 PROCEDURE — 2500000004 HC RX 250 GENERAL PHARMACY W/ HCPCS (ALT 636 FOR OP/ED): Mod: JZ | Performed by: EMERGENCY MEDICINE

## 2025-05-20 PROCEDURE — 74176 CT ABD & PELVIS W/O CONTRAST: CPT | Performed by: RADIOLOGY

## 2025-05-20 PROCEDURE — 84484 ASSAY OF TROPONIN QUANT: CPT | Performed by: INTERNAL MEDICINE

## 2025-05-20 PROCEDURE — 93005 ELECTROCARDIOGRAM TRACING: CPT

## 2025-05-20 PROCEDURE — 99222 1ST HOSP IP/OBS MODERATE 55: CPT | Performed by: NURSE PRACTITIONER

## 2025-05-20 PROCEDURE — 36415 COLL VENOUS BLD VENIPUNCTURE: CPT | Performed by: INTERNAL MEDICINE

## 2025-05-20 PROCEDURE — 36415 COLL VENOUS BLD VENIPUNCTURE: CPT

## 2025-05-20 PROCEDURE — 85027 COMPLETE CBC AUTOMATED: CPT

## 2025-05-20 PROCEDURE — 2500000001 HC RX 250 WO HCPCS SELF ADMINISTERED DRUGS (ALT 637 FOR MEDICARE OP): Performed by: NURSE PRACTITIONER

## 2025-05-20 PROCEDURE — 99232 SBSQ HOSP IP/OBS MODERATE 35: CPT

## 2025-05-20 PROCEDURE — 96366 THER/PROPH/DIAG IV INF ADDON: CPT

## 2025-05-20 PROCEDURE — 86140 C-REACTIVE PROTEIN: CPT | Performed by: INTERNAL MEDICINE

## 2025-05-20 PROCEDURE — 1210000001 HC SEMI-PRIVATE ROOM DAILY

## 2025-05-20 PROCEDURE — 2500000004 HC RX 250 GENERAL PHARMACY W/ HCPCS (ALT 636 FOR OP/ED): Mod: JZ

## 2025-05-20 RX ORDER — POLYETHYLENE GLYCOL 3350 17 G/17G
17 POWDER, FOR SOLUTION ORAL DAILY PRN
Status: DISCONTINUED | OUTPATIENT
Start: 2025-05-20 | End: 2025-05-21 | Stop reason: HOSPADM

## 2025-05-20 RX ORDER — DIVALPROEX SODIUM 250 MG/1
250 TABLET, FILM COATED, EXTENDED RELEASE ORAL EVERY EVENING
Status: DISCONTINUED | OUTPATIENT
Start: 2025-05-20 | End: 2025-05-21

## 2025-05-20 RX ORDER — ACETAMINOPHEN 650 MG/1
650 SUPPOSITORY RECTAL EVERY 4 HOURS PRN
Status: DISCONTINUED | OUTPATIENT
Start: 2025-05-20 | End: 2025-05-21 | Stop reason: HOSPADM

## 2025-05-20 RX ORDER — SODIUM CHLORIDE, SODIUM LACTATE, POTASSIUM CHLORIDE, CALCIUM CHLORIDE 600; 310; 30; 20 MG/100ML; MG/100ML; MG/100ML; MG/100ML
110 INJECTION, SOLUTION INTRAVENOUS CONTINUOUS
Status: ACTIVE | OUTPATIENT
Start: 2025-05-20 | End: 2025-05-21

## 2025-05-20 RX ORDER — ACETAMINOPHEN 325 MG/1
650 TABLET ORAL EVERY 4 HOURS PRN
Status: DISCONTINUED | OUTPATIENT
Start: 2025-05-20 | End: 2025-05-21 | Stop reason: HOSPADM

## 2025-05-20 RX ORDER — DOCUSATE SODIUM 100 MG/1
200 CAPSULE, LIQUID FILLED ORAL 2 TIMES DAILY
Status: DISCONTINUED | OUTPATIENT
Start: 2025-05-20 | End: 2025-05-21 | Stop reason: HOSPADM

## 2025-05-20 RX ORDER — ACETAMINOPHEN 160 MG/5ML
650 SOLUTION ORAL EVERY 4 HOURS PRN
Status: DISCONTINUED | OUTPATIENT
Start: 2025-05-20 | End: 2025-05-21 | Stop reason: HOSPADM

## 2025-05-20 RX ORDER — ACETAMINOPHEN 325 MG/1
650 TABLET ORAL EVERY 6 HOURS PRN
Status: DISCONTINUED | OUTPATIENT
Start: 2025-05-20 | End: 2025-05-20 | Stop reason: SDUPTHER

## 2025-05-20 RX ORDER — SODIUM CHLORIDE 9 MG/ML
200 INJECTION, SOLUTION INTRAVENOUS CONTINUOUS
Status: DISCONTINUED | OUTPATIENT
Start: 2025-05-20 | End: 2025-05-20

## 2025-05-20 RX ORDER — SODIUM CHLORIDE 9 MG/ML
100 INJECTION, SOLUTION INTRAVENOUS CONTINUOUS
Status: ACTIVE | OUTPATIENT
Start: 2025-05-20 | End: 2025-05-20

## 2025-05-20 RX ORDER — DIVALPROEX SODIUM 250 MG/1
500 TABLET, FILM COATED, EXTENDED RELEASE ORAL DAILY
Status: DISCONTINUED | OUTPATIENT
Start: 2025-05-20 | End: 2025-05-21

## 2025-05-20 RX ORDER — ENOXAPARIN SODIUM 100 MG/ML
40 INJECTION SUBCUTANEOUS EVERY 24 HOURS
Status: DISCONTINUED | OUTPATIENT
Start: 2025-05-20 | End: 2025-05-20

## 2025-05-20 RX ADMIN — PIPERACILLIN SODIUM AND TAZOBACTAM SODIUM 3.38 G: 3; .375 INJECTION, SOLUTION INTRAVENOUS at 08:56

## 2025-05-20 RX ADMIN — SODIUM CHLORIDE 100 ML/HR: 9 INJECTION, SOLUTION INTRAVENOUS at 13:05

## 2025-05-20 RX ADMIN — PIPERACILLIN SODIUM AND TAZOBACTAM SODIUM 3.38 G: 3; .375 INJECTION, SOLUTION INTRAVENOUS at 21:10

## 2025-05-20 RX ADMIN — SODIUM CHLORIDE 100 ML/HR: 0.9 INJECTION, SOLUTION INTRAVENOUS at 06:05

## 2025-05-20 RX ADMIN — PIPERACILLIN SODIUM AND TAZOBACTAM SODIUM 4.5 G: 4; .5 INJECTION, SOLUTION INTRAVENOUS at 03:36

## 2025-05-20 RX ADMIN — SODIUM CHLORIDE 1000 ML: 0.9 INJECTION, SOLUTION INTRAVENOUS at 00:28

## 2025-05-20 RX ADMIN — ACETAMINOPHEN 650 MG: 325 TABLET ORAL at 21:10

## 2025-05-20 RX ADMIN — DIVALPROEX SODIUM 500 MG: 500 TABLET, FILM COATED, EXTENDED RELEASE ORAL at 08:54

## 2025-05-20 RX ADMIN — PIPERACILLIN SODIUM AND TAZOBACTAM SODIUM 3.38 G: 3; .375 INJECTION, SOLUTION INTRAVENOUS at 15:52

## 2025-05-20 RX ADMIN — SODIUM CHLORIDE, POTASSIUM CHLORIDE, SODIUM LACTATE AND CALCIUM CHLORIDE 110 ML/HR: 600; 310; 30; 20 INJECTION, SOLUTION INTRAVENOUS at 18:07

## 2025-05-20 RX ADMIN — DIVALPROEX SODIUM 250 MG: 250 TABLET, FILM COATED, EXTENDED RELEASE ORAL at 21:10

## 2025-05-20 SDOH — SOCIAL STABILITY: SOCIAL INSECURITY: DOES ANYONE TRY TO KEEP YOU FROM HAVING/CONTACTING OTHER FRIENDS OR DOING THINGS OUTSIDE YOUR HOME?: NO

## 2025-05-20 SDOH — ECONOMIC STABILITY: INCOME INSECURITY: IN THE PAST 12 MONTHS HAS THE ELECTRIC, GAS, OIL, OR WATER COMPANY THREATENED TO SHUT OFF SERVICES IN YOUR HOME?: NO

## 2025-05-20 SDOH — SOCIAL STABILITY: SOCIAL INSECURITY: WITHIN THE LAST YEAR, HAVE YOU BEEN HUMILIATED OR EMOTIONALLY ABUSED IN OTHER WAYS BY YOUR PARTNER OR EX-PARTNER?: NO

## 2025-05-20 SDOH — SOCIAL STABILITY: SOCIAL INSECURITY: HAVE YOU HAD ANY THOUGHTS OF HARMING ANYONE ELSE?: NO

## 2025-05-20 SDOH — ECONOMIC STABILITY: FOOD INSECURITY: WITHIN THE PAST 12 MONTHS, YOU WORRIED THAT YOUR FOOD WOULD RUN OUT BEFORE YOU GOT THE MONEY TO BUY MORE.: NEVER TRUE

## 2025-05-20 SDOH — HEALTH STABILITY: MENTAL HEALTH: HOW MANY DRINKS CONTAINING ALCOHOL DO YOU HAVE ON A TYPICAL DAY WHEN YOU ARE DRINKING?: PATIENT DOES NOT DRINK

## 2025-05-20 SDOH — ECONOMIC STABILITY: FOOD INSECURITY: WITHIN THE PAST 12 MONTHS, YOU WORRIED THAT YOUR FOOD WOULD RUN OUT BEFORE YOU GOT MONEY TO BUY MORE.: NEVER TRUE

## 2025-05-20 SDOH — SOCIAL STABILITY: SOCIAL INSECURITY: DO YOU FEEL ANYONE HAS EXPLOITED OR TAKEN ADVANTAGE OF YOU FINANCIALLY OR OF YOUR PERSONAL PROPERTY?: NO

## 2025-05-20 SDOH — ECONOMIC STABILITY: FOOD INSECURITY: WITHIN THE PAST 12 MONTHS, THE FOOD YOU BOUGHT JUST DIDN'T LAST AND YOU DIDN'T HAVE MONEY TO GET MORE.: NEVER TRUE

## 2025-05-20 SDOH — HEALTH STABILITY: MENTAL HEALTH: HOW OFTEN DO YOU HAVE SIX OR MORE DRINKS ON ONE OCCASION?: NEVER

## 2025-05-20 SDOH — SOCIAL STABILITY: SOCIAL INSECURITY: WERE YOU ABLE TO COMPLETE ALL THE BEHAVIORAL HEALTH SCREENINGS?: YES

## 2025-05-20 SDOH — HEALTH STABILITY: MENTAL HEALTH: HOW OFTEN DO YOU HAVE A DRINK CONTAINING ALCOHOL?: NEVER

## 2025-05-20 SDOH — SOCIAL STABILITY: SOCIAL INSECURITY: WITHIN THE LAST YEAR, HAVE YOU BEEN AFRAID OF YOUR PARTNER OR EX-PARTNER?: NO

## 2025-05-20 SDOH — SOCIAL STABILITY: SOCIAL INSECURITY: ARE THERE ANY APPARENT SIGNS OF INJURIES/BEHAVIORS THAT COULD BE RELATED TO ABUSE/NEGLECT?: NO

## 2025-05-20 SDOH — SOCIAL STABILITY: SOCIAL INSECURITY: HAS ANYONE EVER THREATENED TO HURT YOUR FAMILY OR YOUR PETS?: NO

## 2025-05-20 SDOH — SOCIAL STABILITY: SOCIAL INSECURITY: HAVE YOU HAD THOUGHTS OF HARMING ANYONE ELSE?: NO

## 2025-05-20 SDOH — SOCIAL STABILITY: SOCIAL INSECURITY: DO YOU FEEL UNSAFE GOING BACK TO THE PLACE WHERE YOU ARE LIVING?: NO

## 2025-05-20 SDOH — SOCIAL STABILITY: SOCIAL INSECURITY: ARE YOU OR HAVE YOU BEEN THREATENED OR ABUSED PHYSICALLY, EMOTIONALLY, OR SEXUALLY BY ANYONE?: NO

## 2025-05-20 SDOH — SOCIAL STABILITY: SOCIAL INSECURITY: ABUSE: ADULT

## 2025-05-20 ASSESSMENT — COGNITIVE AND FUNCTIONAL STATUS - GENERAL
PATIENT BASELINE BEDBOUND: NO
DAILY ACTIVITIY SCORE: 24
DAILY ACTIVITIY SCORE: 24
MOBILITY SCORE: 24
MOBILITY SCORE: 24

## 2025-05-20 ASSESSMENT — ACTIVITIES OF DAILY LIVING (ADL)
WALKS IN HOME: INDEPENDENT
LACK_OF_TRANSPORTATION: NO
HEARING - LEFT EAR: FUNCTIONAL
JUDGMENT_ADEQUATE_SAFELY_COMPLETE_DAILY_ACTIVITIES: YES
GROOMING: INDEPENDENT
ADEQUATE_TO_COMPLETE_ADL: YES
EFFECT OF PAIN ON DAILY ACTIVITIES: DECREASED
AMBULATION_ASSISTANCE: INDEPENDENT
PATIENT'S MEMORY ADEQUATE TO SAFELY COMPLETE DAILY ACTIVITIES?: YES
BEDSIDE_CLEANING: YES
TOILETING: INDEPENDENT
BATHING: INDEPENDENT
FEEDING YOURSELF: INDEPENDENT
DRESSING YOURSELF: INDEPENDENT
HEARING - RIGHT EAR: FUNCTIONAL
LACK_OF_TRANSPORTATION: NO
TOILETING: INDEPENDENT
LACK_OF_TRANSPORTATION: NO

## 2025-05-20 ASSESSMENT — LIFESTYLE VARIABLES
SKIP TO QUESTIONS 9-10: 1
AUDIT-C TOTAL SCORE: 0
TOTAL SCORE: 0
HOW OFTEN DO YOU HAVE 6 OR MORE DRINKS ON ONE OCCASION: NEVER
HOW MANY STANDARD DRINKS CONTAINING ALCOHOL DO YOU HAVE ON A TYPICAL DAY: PATIENT DOES NOT DRINK
AUDIT-C TOTAL SCORE: 0
SKIP TO QUESTIONS 9-10: 1
AUDIT-C TOTAL SCORE: 0
HAVE PEOPLE ANNOYED YOU BY CRITICIZING YOUR DRINKING: NO
HOW OFTEN DO YOU HAVE A DRINK CONTAINING ALCOHOL: NEVER
HAVE YOU EVER FELT YOU SHOULD CUT DOWN ON YOUR DRINKING: NO
EVER HAD A DRINK FIRST THING IN THE MORNING TO STEADY YOUR NERVES TO GET RID OF A HANGOVER: NO
EVER FELT BAD OR GUILTY ABOUT YOUR DRINKING: NO

## 2025-05-20 ASSESSMENT — PAIN DESCRIPTION - LOCATION: LOCATION: BACK

## 2025-05-20 ASSESSMENT — PAIN DESCRIPTION - ONSET: ONSET: ONGOING

## 2025-05-20 ASSESSMENT — PAIN SCALES - GENERAL
PAINLEVEL_OUTOF10: 0 - NO PAIN

## 2025-05-20 ASSESSMENT — PAIN DESCRIPTION - PROGRESSION: CLINICAL_PROGRESSION: RESOLVED

## 2025-05-20 ASSESSMENT — PATIENT HEALTH QUESTIONNAIRE - PHQ9
1. LITTLE INTEREST OR PLEASURE IN DOING THINGS: NOT AT ALL
2. FEELING DOWN, DEPRESSED OR HOPELESS: NOT AT ALL
SUM OF ALL RESPONSES TO PHQ9 QUESTIONS 1 & 2: 0

## 2025-05-20 ASSESSMENT — PAIN DESCRIPTION - ORIENTATION: ORIENTATION: LEFT;MID;LOWER

## 2025-05-20 ASSESSMENT — PAIN DESCRIPTION - FREQUENCY: FREQUENCY: INTERMITTENT

## 2025-05-20 ASSESSMENT — PAIN - FUNCTIONAL ASSESSMENT: PAIN_FUNCTIONAL_ASSESSMENT: 0-10

## 2025-05-20 ASSESSMENT — PAIN DESCRIPTION - DESCRIPTORS
DESCRIPTORS: ACHING
DESCRIPTORS: ACHING

## 2025-05-20 ASSESSMENT — PAIN DESCRIPTION - PAIN TYPE: TYPE: ACUTE PAIN

## 2025-05-20 NOTE — ED NOTES
Report given to Faiza, nurse Flex unit. Hospital transport requested.     Cliff Kimble RN  05/20/25 [phone]

## 2025-05-20 NOTE — CONSULTS
"Reason For Consult  Flank pain hematuria recent procedure     History Of Present Illness  Sofya Dean is a 65 y.o. female with PMH for kidney stones presenting with left flank pain, nausea, and gross hematuria. She is a patient of Dr. Poe who recently underwent cystoscopy, left diagnostic ureteroscopy, and left ureteral catheterization 2/2 a 4mm stone at [facility] on 5/16. Intra op findings showed no no stone visualized in left collecting system.   Severe left flank pain accompanied by nausea and vomiting started that evening after the procedure. She was in so much pain she was unable to sleep. She has been taking 1000 mg of Tylenol and pyridium without relief. She has had little to no appetite and has been feeling weak. Last night she voided \"a lot\" of blood without clots prompting her to come in for further evaluation. Denies fever, chills, lightheadedness, SOB.     Renal US demonstrates mild left-sided hydronephrosis. Soft tissue prominence seen along the left side of the urinary bladder presumably some swelling seen at the [facility]. CT A/P reveals L hydronephrosis and hydroureter. Small nonobstructive left renal calculi, no evidence of obstructive ureteral calculus. Labs remarkable for PARK- creatinine 1.56. No leukocytosis. UA 75 LE, negative nitrates, 6-10 WBC, >20 RBCs.    Past Medical History  She has a past medical history of Abnormal Pap smear of cervix, Allergic (DURICEF), Colon polyp, Difficult intubation (03/04/2024), Epilepsy, Fractures, Hyperlipidemia, Hypothyroidism, Nephrolithiasis, GLENIS (obstructive sleep apnea), Pneumonia, Urinary tract infection, and Varicella.    She has no past medical history of Personal history of irradiation.    Surgical History  She has a past surgical history that includes Colonoscopy w/ polypectomy; Eye surgery; Cosmetic surgery (IMPLANTS); Endometrial ablation; Cervical biopsy w/ loop electrode excision; Augmentation mammaplasty (3-4-24); Kidney stone surgery (dec. 5, " "2024); Cystoscopy (december and january); Colonoscopy; and Tonsillectomy.     Social History  She reports that she quit smoking about 8 years ago. Her smoking use included cigarettes. She has been exposed to tobacco smoke. She has never used smokeless tobacco. She reports that she does not currently use alcohol. She reports that she does not use drugs.    Family History  Family History[1]     Allergies  Chlorpheniramine-pseudoephed, Iodine, and Cefadroxil    Review of Systems  12 point ROS negative unless stated above       Physical Exam    Constitutional: calm and cooperative, NAD  Eyes: PERRL, clear sclera   ENMT: Moist mucous membranes  Cardiovascular: RRR. 2+ equal pulses of the distal extremities.  Respiratory/Thorax: Good symmetric chest expansion. On RA  Gastrointestinal: Abdomen non distended, soft, non tender   Genitourinary: Left CVA tenderness  Musculoskeletal: ROM intact, no joint swelling, normal strength  Neurological: A&Ox3, No focal deficits   Psychological: Appropriate mood and behavior  Skin: Warm and dry       Last Recorded Vitals  Blood pressure 134/79, pulse 87, temperature 37.1 °C (98.8 °F), temperature source Oral, resp. rate 18, height 1.702 m (5' 7\"), weight 90.3 kg (199 lb), SpO2 94%.    Relevant Results  Results for orders placed or performed during the hospital encounter of 05/19/25 (from the past 24 hours)   Urinalysis with Reflex Culture and Microscopic   Result Value Ref Range    Color, Urine Light-Brown (N) Light-Yellow, Yellow, Dark-Yellow    Appearance, Urine Clear Clear    Specific Gravity, Urine 1.005 1.005 - 1.035    pH, Urine 7.5 5.0, 5.5, 6.0, 6.5, 7.0, 7.5, 8.0    Protein, Urine 10 (TRACE) NEGATIVE, 10 (TRACE), 20 (TRACE) mg/dL    Glucose, Urine Normal Normal mg/dL    Blood, Urine OVER (3+) (A) NEGATIVE mg/dL    Ketones, Urine NEGATIVE NEGATIVE mg/dL    Bilirubin, Urine NEGATIVE NEGATIVE mg/dL    Urobilinogen, Urine Normal Normal mg/dL    Nitrite, Urine NEGATIVE NEGATIVE    " Leukocyte Esterase, Urine 75 Tawnya/uL (A) NEGATIVE   Microscopic Only, Urine   Result Value Ref Range    WBC, Urine 6-10 (A) 1-5, NONE /HPF    RBC, Urine >20 (A) NONE, 1-2, 3-5 /HPF   CBC and Auto Differential   Result Value Ref Range    WBC 10.1 4.4 - 11.3 x10*3/uL    nRBC 0.0 0.0 - 0.0 /100 WBCs    RBC 4.40 4.00 - 5.20 x10*6/uL    Hemoglobin 13.1 12.0 - 16.0 g/dL    Hematocrit 39.3 36.0 - 46.0 %    MCV 89 80 - 100 fL    MCH 29.8 26.0 - 34.0 pg    MCHC 33.3 32.0 - 36.0 g/dL    RDW 13.6 11.5 - 14.5 %    Platelets 207 150 - 450 x10*3/uL    Neutrophils % 59.1 40.0 - 80.0 %    Immature Granulocytes %, Automated 0.4 0.0 - 0.9 %    Lymphocytes % 31.5 13.0 - 44.0 %    Monocytes % 7.8 2.0 - 10.0 %    Eosinophils % 0.9 0.0 - 6.0 %    Basophils % 0.3 0.0 - 2.0 %    Neutrophils Absolute 5.97 1.20 - 7.70 x10*3/uL    Immature Granulocytes Absolute, Automated 0.04 0.00 - 0.70 x10*3/uL    Lymphocytes Absolute 3.18 1.20 - 4.80 x10*3/uL    Monocytes Absolute 0.79 0.10 - 1.00 x10*3/uL    Eosinophils Absolute 0.09 0.00 - 0.70 x10*3/uL    Basophils Absolute 0.03 0.00 - 0.10 x10*3/uL   Comprehensive metabolic panel   Result Value Ref Range    Glucose 95 74 - 99 mg/dL    Sodium 137 136 - 145 mmol/L    Potassium 4.3 3.5 - 5.3 mmol/L    Chloride 100 98 - 107 mmol/L    Bicarbonate 28 21 - 32 mmol/L    Anion Gap 13 10 - 20 mmol/L    Urea Nitrogen 16 6 - 23 mg/dL    Creatinine 1.56 (H) 0.50 - 1.05 mg/dL    eGFR 37 (L) >60 mL/min/1.73m*2    Calcium 10.2 8.6 - 10.3 mg/dL    Albumin 4.5 3.4 - 5.0 g/dL    Alkaline Phosphatase 51 33 - 136 U/L    Total Protein 7.6 6.4 - 8.2 g/dL    AST 12 9 - 39 U/L    Bilirubin, Total 0.4 0.0 - 1.2 mg/dL    ALT 13 7 - 45 U/L   Magnesium   Result Value Ref Range    Magnesium 1.96 1.60 - 2.40 mg/dL   Lipase   Result Value Ref Range    Lipase 19 9 - 82 U/L   Lactate   Result Value Ref Range    Lactate 0.9 0.4 - 2.0 mmol/L   Coagulation Screen   Result Value Ref Range    Protime 11.3 9.8 - 12.4 seconds    INR 1.0 0.9 -  1.1    aPTT 28 26 - 36 seconds   Type and Screen   Result Value Ref Range    ABO TYPE O     Rh TYPE NEG     ANTIBODY SCREEN NEG    Troponin I, High Sensitivity, Initial   Result Value Ref Range    Troponin I, High Sensitivity 9 0 - 13 ng/L   Troponin, High Sensitivity, 1 Hour   Result Value Ref Range    Troponin I, High Sensitivity 9 0 - 13 ng/L     US renal complete   Final Result   Mild left-sided hydronephrosis. No renal stones. Soft tissue   prominence seen along the left side of the urinary bladder presumably   some swelling seen at the ureteral vesicular junction. Mass felt to   be unlikely given the patient's recent cystoscopy.        MACRO:   None        Signed by: Abdirahman Patel 5/19/2025 11:40 PM   Dictation workstation:   [id number]             Assessment/Plan     Sofya Dean is a 65 y.o. female with PMH for kidney stones presenting with left flank pain, nausea, and gross hematuria. She is POD 4 cystoscopy, left diagnostic ureteroscopy, and left ureteral catheterization 2/2 a 4mm stone at [facility] on 5/16 with Dr. Poe. Intra op findings showed no no stone visualized in left collecting system.     VSS, afebrile. No leukocytosis. H&H WNL.     Renal US shows mild left-sided hydronephrosis. No renal stones. Soft tissue  prominence seen along the left side of the urinary bladder presumably some swelling seen at the [facility]. Labs remarkable for PARK- creatinine 1.56. No leukocytosis. UA 75 LE, negative nitrates, 6-10 WBC, >20 RBCs.    Plan: Gross hematuria s/p cystoscopy, PARK, acute cystitis.     Will hold off on toure catheter as patient has no visible blood clots and is not retaining.   - IV antibiotics   - follow urine cxs   - IVF  - PRN pain control  - PRN antiemetic   - repeat labs this morning  - monitor urine output    Will staff with urologist this morning for further recommendations.       I spent 60 minutes in the professional and overall care of this patient.      Dee Thayer, APRN-CNP          [1]   Family History  Problem Relation Name Age of Onset    Cancer Mother Kinjal Torsten     Hypertension Mother Kinjal Torsten     Cancer Father Christiankajal Torsten     Kidney disease Father Lane Sarmiento     Diabetes Maternal Grandmother Kinjal Thrasher     Diabetes Maternal Grandfather Srinivas Kirkpatrick

## 2025-05-20 NOTE — PROGRESS NOTES
Pharmacy Medication History     Source of Information: patient with  at bedside    Additional concerns with the patient's PTA list.   N/a  Notified Provider via Haiku : No    The following updates were made to the Prior to Admission medication list:     Medications ADDED:   N/a  Medications CHANGED:  N/a  Medications REMOVED:   N/a  Medications NOT TAKING:   N/a    Allergy reviewed : Yes    Meds 2 Beds : No    Outpatient pharmacy confirmed and updated in chart : Yes    Pharmacy name: [facility]    The list below reflectives the updated PTA list. Please review each medication in order reconciliation for additional clarification and justification.    Prior to Admission Medications   Prescriptions Last Dose Informant   acetaminophen (Tylenol) 325 mg tablet Unknown Self   Sig: Take 2 tablets (650 mg) by mouth every 6 hours if needed for mild pain (1 - 3).   cholecalciferol (Vitamin D-3) 50 mcg (2,000 units) tablet Unknown Self   Sig: Take 1 tablet (50 mcg) by mouth once daily at bedtime.   co-enzyme Q-10 30 mg capsule Unknown Self   Sig: Take 1 capsule (30 mg) by mouth once daily in the morning.   cyanocobalamin (Vitamin B-12) 1,000 mcg tablet Unknown Self   Sig: Take 1 tablet (1,000 mcg) by mouth once daily at bedtime.   divalproex (Depakote ER) 250 mg 24 hr tablet 5/19/2025 Bedtime Self   Sig: Take 2 tablets (500 mg) by mouth once daily in the morning AND 1 tablet (250 mg) once daily in the evening. Do not crush, chew, or split..   estradiol (Estrace) 0.01 % (0.1 mg/gram) vaginal cream  Self   Sig: First 2 weeks: Insert 1/2 gram into the vagina nightly. After initial 2 weeks: Insert 1/2 gram into the vagina twice weekly   krill oil 500 mg capsule Unknown Self   Sig: Take 1 capsule (500 mg) by mouth once daily at bedtime.   magnesium oxide (Mag-Ox) 400 mg tablet Unknown Self   Sig: Take 1 tablet (400 mg) by mouth once daily.   Patient taking differently: Take 1 tablet (400 mg) by mouth once daily at  bedtime.   multivitamin tablet  Self   Sig: Take 1 tablet by mouth once daily at bedtime.      Facility-Administered Medications: None       The list below reflectives the updated allergy list. Please review each documented allergy for additional clarification and justification.    Allergies   Allergen Reactions    Chlorpheniramine-Pseudoephed Hives    Iodine Hives and Swelling    Cefadroxil Itching and Rash          05/20/25 at 8:55 AM - Prosper Edward

## 2025-05-20 NOTE — ED PROVIDER NOTES
Emergency Department Provider Note        History of Present Illness     Chief Complaint: Left Retroperitoneal pain, Blood in Urine, Back Pain, and Nausea   History provided by: Patient  Limitations to History: None  External Chart Review: Telemed call today rec ED for flank pain, hematuria in setting of recent cysto, pyelogram    HPI:  Sofya Dean is a 65 y.o. female presenting to the ED for L flank pain and hematuria.     Patient recently underwent through the superior, left ureteral scope and pyelogram.  Since procedure she has been having left flank pain that has been progressively worsening and she has developed associated nausea.  She began to note significant hematuria today but denies dysuria, urinary frequency or urgency, fevers, chills.  She reached out to urology team who recommended ED evaluation.  She is not on any anticoagulation.  Has not had any difficulty voiding.    Physical Exam   Triage vitals:  T 37.4 °C (99.4 °F)  HR 94  /71  RR 16  O2 96 % None (Room air)    Constitutional: Awake, alert, not in acute distress  HENT: Head atraumatic, mucous membranes moist  Eyes:  Conjunctivae normal  Neck:  Supple  Lungs: Clear to auscultation, breath sounds equal and symmetric, no wheezes, rales, or rhonchi  Heart: Regular rate and rhythm, no murmur, rub or gallop  Abdomen: Soft, nontender, nondistended, no rebound or guarding, L CVAT  Extremities: No lower extremity edema  Neuro: Alert, no focal deficit  Skin: Warm, dry  Psych: Calm, cooperative       Medical Decision Making & ED Course   Medical Decision Making:  Sofya Dean is a 65 y.o. female who presented to the ED for left flank pain and hematuria in setting of recent urologic procedure. Patient was afebrile, hemodynamically stable, and satting on room air on arrival.     Exam as above.  Patient nontoxic-appearing.    CBC No leukocytosis, anemia, or thrombocytopenia  CMP PARK, no clinically significant electrolyte abnormalities, no  signs of obstructive biliary pathology or acute liver injury  Mag normal  Lipase normal  UA with gross hematuria, mildly positive leuk esterase, negative nitrite, mild pyuria    She is not currently having any dysuria, fevers, chills, is hemodynamically stable, no leukocytosis therefore we will hold antibiotics pending urology evaluation.  Renal ultrasound obtained from triage process and shows left-sided hydronephrosis.  Personally discussed case with urology who recommend obtaining CT abdomen/pelvis and they will evaluate the patient.    Diagnoses as of 05/20/25 1625   Urinary tract infection without hematuria, site unspecified   Hydronephrosis, unspecified hydronephrosis type   Hydronephrosis of left kidney     ------------------------------------------------  Independent Test Interpretation: See above [facility]  Personal Discussion with Other Providers: See above [facility]    Disposition   Patient was signed out to  at approx 0200 pending completion of their work-up.  Please see the next provider's transition of care note for the remainder of the patient's care.     Procedures   Procedures    Steffen Simerlink, MD Steffen Simerlink, MD  05/20/25 2884

## 2025-05-20 NOTE — ED NOTES
Pt transported to Flex Unit RM 1-A by hospital transporter on wheelchair with safety/fall precautions, mask on, belongings, paperwork, family, IV infusing. No significant detrimental changes prior to transfer. Neuro/skin/respiratory grossly WDL.      Cliff Kimble RN  05/20/25 2905

## 2025-05-20 NOTE — PROGRESS NOTES
Transitional Care Coordination Progress Note:  Plan per Medical/Surgical team: treatment of UTI, cystitis, hematuria post cysto, PARK & left hydronephrosis with IV ATB, IV fluids, urology consult   Status: Observation  Payor source: [employer] ( ?Healthy @ home)  Discharge disposition: Home with    Potential Barriers: renal 16/1.56  ADOD: 5/21/2025   COOPER Medina RN, BSN Transitional Care Coordinator ED# [phone]      05/20/25 0644   Discharge Planning   Living Arrangements Spouse/significant other   Support Systems Spouse/significant other   Assistance Needed urology consult, IV ATB, IV fluids   Type of Residence Private residence   Number of Stairs to Enter Residence 4   Number of Stairs Within Residence 15   Do you have animals or pets at home? Yes   Type of Animals or Pets 2 dogs   Home or Post Acute Services None   Expected Discharge Disposition Home   Does the patient need discharge transport arranged? No   Financial Resource Strain   How hard is it for you to pay for the very basics like food, housing, medical care, and heating? Not hard   Housing Stability   In the last 12 months, was there a time when you were not able to pay the mortgage or rent on time? N   In the past 12 months, how many times have you moved where you were living? 1   At any time in the past 12 months, were you homeless or living in a shelter (including now)? N   Transportation Needs   In the past 12 months, has lack of transportation kept you from medical appointments or from getting medications? no   In the past 12 months, has lack of transportation kept you from meetings, work, or from getting things needed for daily living? No   Stroke Family Assessment   Stroke Family Assessment Needed No   Intensity of Service   Intensity of Service 0-30 min

## 2025-05-20 NOTE — H&P
HPI: Sofya Dean is a 65 y.o. female, with a PMH of Epilepsy,HLP,  Hypothyroidism, Nephrolithiasis, GLENIS, Pneumonia, Urinary tract infection , hematuria who presented to [facility] ED on 5/19/2025 for L flank pain.  Pt had Lt Ureteroscopy for L Renal calci, per pt stone had resolved, she states no stent placed  w/ procedure on  5/16.  Pt states felt better saturday, still had nausea intermittently pt states last night increase pian on Left side and increased blood in urine, pt states now it is pink tinge urine . On assessment pt denies  pain at this time. She denies fever or chills.      ED Course:   VSS  Labs creat 1.56  EKG   UA + Leuko,  negative nitrates-  + Blood  Atx tx empiric for UTI  CT ABD : Left hydronephrosis and hydroureter. Small nonobstructive left renal  calculi.  US Renal:Mild left-sided hydronephrosis. No renal stones.     Patient History   PMH: She has a past medical history of Abnormal Pap smear of cervix, Allergic (DURICEF), Colon polyp, Difficult intubation (03/04/2024), Epilepsy, Fractures, Hyperlipidemia, Hypothyroidism, Nephrolithiasis, GLENIS (obstructive sleep apnea), Pneumonia, Urinary tract infection, and Varicella.    She has no past medical history of Personal history of irradiation.  PSH: She has a past surgical history that includes Colonoscopy w/ polypectomy; Eye surgery; Cosmetic surgery (IMPLANTS); Endometrial ablation; Cervical biopsy w/ loop electrode excision; Augmentation mammaplasty (3-4-24); Kidney stone surgery (dec. 5, 2024); Cystoscopy (december and january); Colonoscopy; and Tonsillectomy.  SH: She reports that she quit smoking about 8 years ago. Her smoking use included cigarettes. She has been exposed to tobacco smoke. She has never used smokeless tobacco. She reports that she does not currently use alcohol. She reports that she does not use drugs.  FH: family history includes Cancer in her father and mother; Diabetes in her maternal grandfather and maternal  "grandmother; Hypertension in her mother; Kidney disease in her father.     RX Allergies[1]  Current Outpatient Medications   Medication Instructions    acetaminophen (TYLENOL) 650 mg, oral, Every 6 hours PRN    cholecalciferol (VITAMIN D-3) 50 mcg, Nightly    co-enzyme Q-10 30 mg, Every morning    cyanocobalamin (VITAMIN B-12) 1,000 mcg, Nightly    divalproex (Depakote ER) 250 mg 24 hr tablet Take 2 tablets (500 mg) by mouth once daily in the morning AND 1 tablet (250 mg) once daily in the evening. Do not crush, chew, or split..    estradiol (Estrace) 0.01 % (0.1 mg/gram) vaginal cream First 2 weeks: Insert 1/2 gram into the vagina nightly. After initial 2 weeks: Insert 1/2 gram into the vagina twice weekly    krill oil 500 mg capsule Nightly    magnesium oxide (MAG-OX) 400 mg, oral, Daily    methocarbamol (ROBAXIN) 750 mg, oral, 4 times daily    multivitamin tablet 1 tablet, Nightly    ondansetron (ZOFRAN) 4 mg, oral, Every 8 hours PRN    phenazopyridine (PYRIDIUM) 200 mg, oral, 3 times daily PRN    rosuvastatin (CRESTOR) 10 mg, oral, Daily    tamsulosin (FLOMAX) 0.4 mg, oral, Daily     Review of Systems   ROS: 10-point review of systems was performed and is otherwise negative except as noted in HPI.        Heart Rate:  [87-94]   Temperature:  [37.1 °C (98.8 °F)-37.4 °C (99.4 °F)]   Respirations:  [16-18]   BP: (134-137)/(71-79)   Height:  [170.2 cm (5' 7\")]   Weight:  [90.3 kg (199 lb)]   Pulse Ox:  [94 %-96 %]      0-10 (Numeric) Pain Score: 4   Vitals:    05/19/25 2128   Weight: 90.3 kg (199 lb)        Physical Exam:  Vitals and nursing notes reviewed.  GENERAL: Alert and awake, cooperative; in no acute distress  SKIN: Warm and dry, cap refill <2  HEENT: Normocephalic, PEERL, mucous membranes pink and moist  CARDIAC: Regular rate and rhythm, S1S2, no murmurs or abnormal heart sounds  CHEST: Normal respiratory effort, no abnormal breath sounds  ABDOMEN: soft, non-distended, non-tender with palpation  EXTREMITIES: " No lower extremity edema, normal pulses all 4 extremities  NEURO: Alert and oriented, mental status at baseline, no focal deficits  PSYCH: Behavior and affect as expected     Medications  Scheduled Medications[2]Continuous Medications[3]PRN Medications[4]    Diagnostic Results   CBC- 2025: 10:33 PM  10.1 13.1 207    39.3      BMP- 2025: 10:33 PM  137 16 _ 95   4.3 1.56 28    Estimated Creatinine Clearance: 40.9 mL/min (A) (by C-G formula based on SCr of 1.56 mg/dL (H)).     CA: 10.2 PROTIEN: 7.6 ALT: 13 Total Bili: 0.4 M.96   PHOS: 2.9 ALBUMIN: 4.5 AST: 12   Alk Phos: 51      COAGS- 2025: 10:33 PM  1.0   11.3 28     CV Labs  Troponin I, High Sensitivity   Date/Time Value Ref Range Status   2025 01:10 AM 9 0 - 13 ng/L Final   2025 10:33 PM 9 0 - 13 ng/L Final     Hemoglobin A1C   Date/Time Value Ref Range Status   2025 03:07 PM 5.3 See comment % Final     LDL Calculated   Date/Time Value Ref Range Status   2024 11:11 AM 85 <=99 mg/dL Final     Comment:                                 Near   Borderline      AGE      Desirable  Optimal    High     High     Very High     0-19 Y     0 - 109     ---    110-129   >/= 130     ----    20-24 Y     0 - 119     ---    120-159   >/= 160     ----      >24 Y     0 -  99   100-129  130-159   160-189     >/=190           HPI: Sofya Dean is a 65 y.o. female, with a PMH of Epilepsy,HLP, Nephrolithiasis, GLENIS, Pneumonia, Urinary tract infection , hematuria who presented to [facility] ED on 2025 for L flank pain.  Pt had Lt Ureteroscopy for L Renal calci, per pt stone had resolved, she states no stent placed  w/ procedure on  .  Pt states felt better saturday, still had nausea intermittently pt states last night increase pian on Left side and increased blood in urine, pt states now it is pink tinge urine . On assessment pt denies  pain at this time. She denies fever or chills.    Assessment/Plan     Left flank pain/hematuria/nausea,  s/p recent cystoscopy  CT and US show mild left-sided hydronephrosis. No renal stones. Soft tissue prominence seen along the left side of the urinary bladder presumably some swelling seen at the UVJ  Possible UTI  PARK  - Urology consult  - PRN analgesics    - IV fluids, trend labs, avoid nephrotoxic agents  - repeat lab in AM    Hematuria  ? UTI  - Urology consult  - Continue Atx per  GS  note-follow cultures    GLENIS  - CPAP ordered- Rt to assess home settings     Epilepsy  - continue Depakote     GI/VTE PPX: PPI, SQ Lovenox - holding d/t hematuria       Chart, medical history, and labs/testing reviewed in detail.   Case and plan of care to be discussed with and reviewed by Lead NP      Disposition: Discharge Home once medically cleared and stable, pending  urology consult    JOURDAN Roy-CNP   Observation/Internal Med EKTA  [facility]  05/20/25  5:02 AM  Total time of 60 minutes spent on professional and overall care, with >50% of time dedicated to counseling/coordination of care.        [1]   Allergies  Allergen Reactions    Chlorpheniramine-Pseudoephed Hives    Iodine Hives and Swelling    Cefadroxil Itching and Rash   [2] [3] [4]

## 2025-05-20 NOTE — PROGRESS NOTES
05/20/25 0644   [facility] Disability Status   Are you deaf or do you have serious difficulty hearing? N   Are you blind or do you have serious difficulty seeing, even when wearing glasses? N   Because of a physical, mental, or emotional condition, do you have serious difficulty concentrating, remembering, or making decisions? (5 years old or older) N   Do you have serious difficulty walking or climbing stairs? N   Do you have serious difficulty dressing or bathing? N   Because of a physical, mental, or emotional condition, do you have serious difficulty doing errands alone such as visiting the doctor? N

## 2025-05-20 NOTE — CARE PLAN
The patient's goals for the shift include      The clinical goals for the shift include pain management this shift    Problem: Pain - Adult  Goal: Verbalizes/displays adequate comfort level or baseline comfort level  5/20/2025 1204 by Faiza Angelo RN  Outcome: Progressing  5/20/2025 1204 by Faiza Angelo RN  Outcome: Progressing     Problem: Safety - Adult  Goal: Free from fall injury  5/20/2025 1204 by Faiza Angelo RN  Outcome: Progressing  5/20/2025 1204 by Faiza Angelo RN  Outcome: Progressing     Problem: Discharge Planning  Goal: Discharge to home or other facility with appropriate resources  5/20/2025 1204 by Faiza Angelo RN  Outcome: Progressing  5/20/2025 1204 by Faiza Angelo RN  Outcome: Progressing     Problem: Chronic Conditions and Co-morbidities  Goal: Patient's chronic conditions and co-morbidity symptoms are monitored and maintained or improved  5/20/2025 1204 by Faiza Angelo RN  Outcome: Progressing  5/20/2025 1204 by Faiza Angelo RN  Outcome: Progressing     Problem: Nutrition  Goal: Nutrient intake appropriate for maintaining nutritional needs  5/20/2025 1204 by Faiza Angelo RN  Outcome: Progressing  5/20/2025 1204 by Faiza Angelo RN  Outcome: Progressing

## 2025-05-20 NOTE — ED TRIAGE NOTES
"Patient arrived to the ED from home via POV. She was diagnosed with Nephrolithiasis and had Renal Surgical Procedure (Urography, Cystourethroscopy, Pyeloscopy) on Friday (5/16). Since then she has been having \"severe\" Left sided retroperitoneal pain, experiencing recurrent nausea, and is now having significant polyuria that began suddenly this evening (5/19) @ 19:00. She was directed by her Surgeon's office to come to the ED.  "

## 2025-05-20 NOTE — ED TRIAGE NOTES
TRIAGE NOTE   I saw the patient as the Clinician in Triage and performed a brief history and physical exam, established acuity, and ordered appropriate tests to develop basic plan of care. Patient will be seen by an EKTA, resident and/or physician who will independently evaluate the patient. Please see subsequent provider notes for further details and disposition.     Brief HPI: In brief, Sofya Dean is a 65 y.o. female that presents for left flank pain, nausea and gross hematuria.  Patient is s/p diagnostic URS on the left on 5/16 with Dr. SANCHEZ for kidney stone.  She states they did not find a stone, just looked with a camera.  Immediately following the procedure, she had 20/10 pain in the left flank with associated nausea and vomiting.  The pain subsided a little bit but she has been generally unwell since with associated left flank pain, nausea, and poor appetite.  She is not really getting up and doing much due to the symptoms.  7 PM she developed gross hematuria, has not noticed any clots.  She denies fevers or chills.  She did have a stone removal with a stent back in December and postoperatively had none of these similar symptoms.  She denies dysuria, change in bowel habits, or other symptoms.    Focused Physical exam:   VSS. On exam, patient is well appearing and in no apparent distress. Heart has RRR, no MRG.  Lungs are CTAB, no WRR, no WOB. Abdomen is soft, NTND.  L CVAT with some fullness overlying the left inferior rib.  MAEx4, extremities WWP, no CCE.    Plan/MDM:   Patient presents with postoperative nausea, flank pain, and gross hematuria following urologic procedure on 5/16.  She is hemodynamically stable and generally well-appearing with exquisite tenderness over the left flank on exam.  She does have blood-tinged urine at bedside.  Differential includes UTI/pyelonephritis, ureteral or kidney injury, recurrent kidney stone.  Workup was initiated with ECG, labs, CTAP.  Patient prefers to start  with a renal ultrasound to avoid radiation and as that was the test Dr. SANCHEZ had recommended when she saw him earlier today.  She was given Toradol, Zofran, and a fluid bolus to initiate treatment.    Please see subsequent provider note for further details and disposition

## 2025-05-20 NOTE — PROGRESS NOTES
05/20/25 0644   [facility] Disability Status   Are you deaf or do you have serious difficulty hearing? N   Are you blind or do you have serious difficulty seeing, even when wearing glasses? N   Because of a physical, mental, or emotional condition, do you have serious difficulty concentrating, remembering, or making decisions? (5 years old or older) N   Do you have serious difficulty walking or climbing stairs? N   Do you have serious difficulty dressing or bathing? N   Because of a physical, mental, or emotional condition, do you have serious difficulty doing errands alone such as visiting the doctor? Y  (hx of Epilepsy)

## 2025-05-21 ENCOUNTER — APPOINTMENT (OUTPATIENT)
Dept: RADIOLOGY | Facility: HOSPITAL | Age: 65
End: 2025-05-21
Payer: MEDICARE

## 2025-05-21 VITALS
DIASTOLIC BLOOD PRESSURE: 61 MMHG | OXYGEN SATURATION: 94 % | SYSTOLIC BLOOD PRESSURE: 128 MMHG | HEIGHT: 67 IN | HEART RATE: 70 BPM | RESPIRATION RATE: 18 BRPM | TEMPERATURE: 97.2 F | BODY MASS INDEX: 31.23 KG/M2 | WEIGHT: 199 LBS

## 2025-05-21 LAB
ANION GAP SERPL CALC-SCNC: 12 MMOL/L (ref 10–20)
BACTERIA UR CULT: NORMAL
BUN SERPL-MCNC: 21 MG/DL (ref 6–23)
CALCIUM SERPL-MCNC: 8.8 MG/DL (ref 8.6–10.3)
CHLORIDE SERPL-SCNC: 108 MMOL/L (ref 98–107)
CO2 SERPL-SCNC: 25 MMOL/L (ref 21–32)
CREAT SERPL-MCNC: 1.36 MG/DL (ref 0.5–1.05)
EGFRCR SERPLBLD CKD-EPI 2021: 43 ML/MIN/1.73M*2
ERYTHROCYTE [DISTWIDTH] IN BLOOD BY AUTOMATED COUNT: 14 % (ref 11.5–14.5)
GLUCOSE SERPL-MCNC: 91 MG/DL (ref 74–99)
HCT VFR BLD AUTO: 35 % (ref 36–46)
HGB BLD-MCNC: 11.1 G/DL (ref 12–16)
MCH RBC QN AUTO: 29.2 PG (ref 26–34)
MCHC RBC AUTO-ENTMCNC: 31.7 G/DL (ref 32–36)
MCV RBC AUTO: 92 FL (ref 80–100)
NRBC BLD-RTO: 0 /100 WBCS (ref 0–0)
PLATELET # BLD AUTO: 188 X10*3/UL (ref 150–450)
POTASSIUM SERPL-SCNC: 4.6 MMOL/L (ref 3.5–5.3)
RBC # BLD AUTO: 3.8 X10*6/UL (ref 4–5.2)
SODIUM SERPL-SCNC: 140 MMOL/L (ref 136–145)
WBC # BLD AUTO: 7.3 X10*3/UL (ref 4.4–11.3)

## 2025-05-21 PROCEDURE — 85027 COMPLETE CBC AUTOMATED: CPT | Performed by: INTERNAL MEDICINE

## 2025-05-21 PROCEDURE — 2500000001 HC RX 250 WO HCPCS SELF ADMINISTERED DRUGS (ALT 637 FOR MEDICARE OP): Performed by: NURSE PRACTITIONER

## 2025-05-21 PROCEDURE — 80048 BASIC METABOLIC PNL TOTAL CA: CPT | Performed by: INTERNAL MEDICINE

## 2025-05-21 PROCEDURE — 36415 COLL VENOUS BLD VENIPUNCTURE: CPT | Performed by: INTERNAL MEDICINE

## 2025-05-21 PROCEDURE — 99232 SBSQ HOSP IP/OBS MODERATE 35: CPT | Performed by: NURSE PRACTITIONER

## 2025-05-21 PROCEDURE — 2500000004 HC RX 250 GENERAL PHARMACY W/ HCPCS (ALT 636 FOR OP/ED): Mod: JZ | Performed by: NURSE PRACTITIONER

## 2025-05-21 PROCEDURE — 99238 HOSP IP/OBS DSCHRG MGMT 30/<: CPT | Performed by: INTERNAL MEDICINE

## 2025-05-21 RX ORDER — DIVALPROEX SODIUM 250 MG/1
500 TABLET, FILM COATED, EXTENDED RELEASE ORAL DAILY
Status: DISCONTINUED | OUTPATIENT
Start: 2025-05-21 | End: 2025-05-21 | Stop reason: HOSPADM

## 2025-05-21 RX ORDER — DIVALPROEX SODIUM 250 MG/1
250 TABLET, FILM COATED, EXTENDED RELEASE ORAL EVERY EVENING
Status: DISCONTINUED | OUTPATIENT
Start: 2025-05-21 | End: 2025-05-21 | Stop reason: HOSPADM

## 2025-05-21 RX ORDER — CIPROFLOXACIN 500 MG/1
500 TABLET, FILM COATED ORAL 2 TIMES DAILY
Qty: 6 TABLET | Refills: 0 | Status: SHIPPED | OUTPATIENT
Start: 2025-05-21 | End: 2025-05-23 | Stop reason: SINTOL

## 2025-05-21 RX ADMIN — PIPERACILLIN SODIUM AND TAZOBACTAM SODIUM 3.38 G: 3; .375 INJECTION, SOLUTION INTRAVENOUS at 08:35

## 2025-05-21 RX ADMIN — DIVALPROEX SODIUM 500 MG: 250 TABLET, FILM COATED, EXTENDED RELEASE ORAL at 08:35

## 2025-05-21 RX ADMIN — PIPERACILLIN SODIUM AND TAZOBACTAM SODIUM 3.38 G: 3; .375 INJECTION, SOLUTION INTRAVENOUS at 03:43

## 2025-05-21 NOTE — PROGRESS NOTES
Medicine PA follow up note    Subjective:  Patient sitting up in bed w/ CPAP placed. She reports improvement in flank pain. She states her urine is still pink tinged. Kidney function levels elevated. Continuing IVF. Urology following. Possible stent tomorrow if no improvement in creatine.    Vitals (Last 24 Hours):  Heart Rate:  [73-88]   Temp:  [36 °C (96.8 °F)-37.8 °C (100 °F)]   Resp:  [16-19]   BP: (118-143)/(60-79)   SpO2:  [94 %-98 %]       I have reviewed all imaging reports and labs pertinent to this visit / presenting problem    PHYSICAL EXAM:  Constitutional: NAD, alert and cooperative  Eyes: no icterus  ENMT: mucous membranes moist, no lesions  Head/Neck: supple  Respiratory/Thorax: CTA bilaterally, non-labored breathing, no cough, on RA  Cardiovascular: RRR, no murmurs heard  Gastrointestinal: ND/S/NT  : no Herrera, mild L flank discomfort  Musculoskeletal: no joint swelling, ROM intact  Extremities: no edema  Neurological: non-focal  Skin: warm and dry  Psych: calm, stable mood     MEDS:  Scheduled meds  Scheduled Medications[1]    Continuous meds  Continuous Medications[2]    PRN meds  PRN Medications[3]      ASSESSMENT/PLAN:  Sofya Dean is a 65 y.o. female, with a PMH of Epilepsy, HLD, Hypothyroidism, Nephrolithiasis, GLENIS, presented to [facility] ED on 5/19/2025 for L flank pain.  Pt had Lt Ureteroscopy for L Renal calci, per pt stone had resolved, she states no stent placed  w/ procedure on  5/16.  Pt states felt better saturday, still had nausea intermittently pt states last night increase pain on the left side and increased blood in urine, pt states now it is pink tinged urine. She denies fever or chills.     ED Course:   VSS  Labs creat 1.56  EKG   UA + Leuko,  negative nitrates-  + Blood  Atx tx empiric for UTI  CT ABD : Left hydronephrosis and hydroureter. Small nonobstructive left renal calculi.  US Renal: Mild left-sided hydronephrosis. No renal stones.     Left flank  pain/hematuria/nausea  Possible UTI  PARK  -Creatinine: 1.56 -> 1.64 -> 1.65  -WBC: 7.6, afebrile   -CRP: 9.96  -UA as above, urine culture pending   - repeat lab in AM  -imaging as above   -IVF  -IV zosyn 3.375 g q 6 hrs   -PRN analgesics    -urology consulted, if creatinine does not improve, possible stent placement tomorrow      GLENIS  - CPAP ordered- Rt to assess home settings      Epilepsy  - continue Depakote 500 mg daily and 250 mg evening    Other comorbidities as above  -continue medications as ordered and adjust based on clinical course     VTE / GI prophylaxis   -holding prophylaxis due to hematuria, SCDs, bowel regimen in place     Discharge planning  -HNN when medically stable    Discussed with Dr. Schmid and the interdisciplinary team    Upgraded from obs to inpatient, Dr. Gibson to assume care, notified via epic secure chat      Nenita Mclean PA-C          [1] divalproex, 500 mg, oral, Daily   And  divalproex, 250 mg, oral, q PM  docusate sodium, 200 mg, oral, BID  piperacillin-tazobactam, 3.375 g, intravenous, q6h  [2] lactated Ringer's, 110 mL/hr, Last Rate: 110 mL/hr (05/20/25 1421)  [3] PRN medications: acetaminophen **OR** acetaminophen **OR** acetaminophen, polyethylene glycol

## 2025-05-21 NOTE — PROGRESS NOTES
"Sofya Dean is a 65 y.o. female on day 1 of admission presenting with UTI (urinary tract infection).    Assessment/Plan   Flank pain, hematuria, s/p recent cystoscopy     VSS, afebrile, Cr downtrending 1.65 -> 1.36, WBC 7.3, Hgb 11.1    - Discussed that the elevated Cr, hematuria, flank pain, positive UA, and CT only noting hydronephrosis, all may be due to a UTI causing backflow  - Restart diet  - Pain control  - Continue course of ABX at home  - Repeat BMP in 1 week to monitor Cr    Discussed with Dr Leonard Patton for discharge from urology perspective.       Subjective   Feeling a lot better. Still having some pink urine.       Objective     Physical Exam  Constitutional: A&Ox3, calm and cooperative, NAD  Eyes: EOMI, clear sclera   Cardiovascular: Normal rate and regular rhythm. No murmurs  Respiratory/Thorax: CTAB, on RA  Gastrointestinal: Abdomen soft, nontender  Genitourinary: Voiding independently, mild L CVA tenderness   Musculoskeletal: ROM intact  Extremities: No peripheral edema  Neurological: A&Ox3, No focal deficits   Psychological: Appropriate mood and behavior    Last Recorded Vitals  Blood pressure 128/61, pulse 70, temperature 36.2 °C (97.2 °F), temperature source Temporal, resp. rate 18, height 1.702 m (5' 7\"), weight 90.3 kg (199 lb), SpO2 94%.  Intake/Output last 3 Shifts:  I/O last 3 completed shifts:  In: 1240.3 (13.7 mL/kg) [I.V.:1190.3 (13.2 mL/kg); IV Piggyback:50]  Out: - (0 mL/kg)   Weight: 90.3 kg     Relevant Results    Scheduled medications  Scheduled Medications[1]  Continuous medications  Continuous Medications[2]  PRN medications  PRN Medications[3]    Results for orders placed or performed during the hospital encounter of 05/19/25 (from the past 24 hours)   Basic metabolic panel   Result Value Ref Range    Glucose 105 (H) 74 - 99 mg/dL    Sodium 140 136 - 145 mmol/L    Potassium 4.5 3.5 - 5.3 mmol/L    Chloride 107 98 - 107 mmol/L    Bicarbonate 26 21 - 32 mmol/L    Anion Gap 12 " 10 - 20 mmol/L    Urea Nitrogen 21 6 - 23 mg/dL    Creatinine 1.65 (H) 0.50 - 1.05 mg/dL    eGFR 34 (L) >60 mL/min/1.73m*2    Calcium 9.3 8.6 - 10.3 mg/dL   CBC   Result Value Ref Range    WBC 7.3 4.4 - 11.3 x10*3/uL    nRBC 0.0 0.0 - 0.0 /100 WBCs    RBC 3.80 (L) 4.00 - 5.20 x10*6/uL    Hemoglobin 11.1 (L) 12.0 - 16.0 g/dL    Hematocrit 35.0 (L) 36.0 - 46.0 %    MCV 92 80 - 100 fL    MCH 29.2 26.0 - 34.0 pg    MCHC 31.7 (L) 32.0 - 36.0 g/dL    RDW 14.0 11.5 - 14.5 %    Platelets 188 150 - 450 x10*3/uL   Basic Metabolic Panel   Result Value Ref Range    Glucose 91 74 - 99 mg/dL    Sodium 140 136 - 145 mmol/L    Potassium 4.6 3.5 - 5.3 mmol/L    Chloride 108 (H) 98 - 107 mmol/L    Bicarbonate 25 21 - 32 mmol/L    Anion Gap 12 10 - 20 mmol/L    Urea Nitrogen 21 6 - 23 mg/dL    Creatinine 1.36 (H) 0.50 - 1.05 mg/dL    eGFR 43 (L) >60 mL/min/1.73m*2    Calcium 8.8 8.6 - 10.3 mg/dL       CT abdomen pelvis wo IV contrast   Final Result   Left hydronephrosis and hydroureter. Small nonobstructive left renal   calculi. There is however no evidence of obstructive ureteral   calculus. Focus of air in the proximal left ureter and focus of air   in the urinary may relate to recent procedure, however clinical   correlation is recommended. Clinical correlation with urinalysis for   upper urinary tract infection is also recommended and follow-up   imaging to assure resolution of the hydronephrosis and exclude other   underlying pathology.        MACRO:   None        Signed by: Cliff Ray 5/20/2025 3:09 AM   Dictation workstation:   [id number]      US renal complete   Final Result   Mild left-sided hydronephrosis. No renal stones. Soft tissue   prominence seen along the left side of the urinary bladder presumably   some swelling seen at the ureteral vesicular junction. Mass felt to   be unlikely given the patient's recent cystoscopy.        MACRO:   None        Signed by: Abdirahman Patel 5/19/2025 11:40 PM   Dictation workstation:    [id number]             I spent 35 minutes in the professional and overall care of this patient.    Jim Palomares, APRN-CNP         [1] divalproex, 500 mg, oral, Daily   And  divalproex, 250 mg, oral, q PM  docusate sodium, 200 mg, oral, BID  piperacillin-tazobactam, 3.375 g, intravenous, q6h  [2]    [3] PRN medications: acetaminophen **OR** acetaminophen **OR** acetaminophen, polyethylene glycol

## 2025-05-21 NOTE — DISCHARGE SUMMARY
Discharge Diagnosis  UTI (urinary tract infection)           Issues Requiring Follow-Up  Urology    Discharge Meds     Medication List      START taking these medications     ciprofloxacin 500 mg tablet; Commonly known as: Cipro; Take 1 tablet   (500 mg) by mouth 2 times a day for 3 days.   tamsulosin 0.4 mg 24 hr capsule; Commonly known as: Flomax; Take 1   capsule (0.4 mg) by mouth once daily.     CONTINUE taking these medications     acetaminophen 325 mg tablet; Commonly known as: Tylenol; Take 2 tablets   (650 mg) by mouth every 6 hours if needed for mild pain (1 - 3).   cholecalciferol 50 mcg (2,000 units) tablet; Commonly known as: Vitamin   D-3   co-enzyme Q-10 30 mg capsule   cyanocobalamin 1,000 mcg tablet; Commonly known as: Vitamin B-12   divalproex 250 mg 24 hr tablet; Commonly known as: Depakote ER; Take 2   tablets (500 mg) by mouth once daily in the morning AND 1 tablet (250 mg)   once daily in the evening. Do not crush, chew, or split..   krill oil 500 mg capsule   magnesium oxide 400 mg tablet; Commonly known as: Mag-Ox; Take 1 tablet   (400 mg) by mouth once daily.   multivitamin tablet   rosuvastatin 10 mg tablet; Commonly known as: Crestor; Take 1 tablet (10   mg) by mouth once daily.     STOP taking these medications     methocarbamol 750 mg tablet; Commonly known as: Robaxin   ondansetron 4 mg tablet; Commonly known as: Zofran   phenazopyridine 200 mg tablet; Commonly known as: Pyridium   sulfamethoxazole-trimethoprim 800-160 mg tablet; Commonly known as:   Bactrim DS     ASK your doctor about these medications     docusate sodium 100 mg capsule; Commonly known as: Colace; Take 2   capsules (200 mg) by mouth 2 times a day for 3 days.; Ask about: Should I   take this medication?   estradiol 0.01 % (0.1 mg/gram) vaginal cream; Commonly known as:   Estrace; First 2 weeks: Insert 1/2 gram into the vagina nightly. After   initial 2 weeks: Insert 1/2 gram into the vagina twice weekly       Test  Results Pending At Discharge  Pending Labs       No current pending labs.            Hospital Course   Sofya Dean is a 65 y.o. female, with a PMH of Epilepsy,HLP,  Hypothyroidism, Nephrolithiasis, GLENIS, Pneumonia, Urinary tract infection , hematuria who presented to [facility] ED on 5/19/2025 for L flank pain.  Pt had Lt Ureteroscopy for L Renal calci, per pt stone had resolved, she states no stent placed  w/ procedure on  5/16.  Pt states felt better saturday, still had nausea intermittently pt states last night increase pian on Left side and increased blood in urine, pt states now it is pink tinge urine . On assessment pt denies  pain at this time. She denies fever or chills.  Doing better postop day 1  Seen and cleared by urology  Discharged home in stable condition on antibiotics    Pertinent Physical Exam At Time of Discharge  Physical Exam    Outpatient Follow-Up  Future Appointments   Date Time Provider Department Center   7/9/2025 10:45 AM Princess Aguiar PA-C [id number] [facility]   7/29/2025  1:00 PM Jose Quigley MD PhD [id number] [facility]   8/13/2025  1:40 PM Cristina Poe MD [id number] [facility]   10/16/2025  3:40 PM Nikolas Quinonez MD [id number] [facility]   11/18/2025  1:30 PM Avis Odell MD [id number] [facility]         Monica Gibson MD

## 2025-05-21 NOTE — CARE PLAN
The patient's goals for the shift include  remaining comfortable    The clinical goals for the shift include pain management this shift

## 2025-05-21 NOTE — CARE PLAN
Problem: Pain - Adult  Goal: Verbalizes/displays adequate comfort level or baseline comfort level  Outcome: Met   The patient's goals for the shift include      The clinical goals for the shift include remaining comfortable    Patient educated on discharge instructions. All questions answered. Patient driving herself home.

## 2025-05-22 ENCOUNTER — PATIENT OUTREACH (OUTPATIENT)
Dept: PRIMARY CARE | Facility: CLINIC | Age: 65
End: 2025-05-22
Payer: MEDICARE

## 2025-05-22 DIAGNOSIS — L91.8 OTHER HYPERTROPHIC DISORDERS OF THE SKIN: ICD-10-CM

## 2025-05-22 NOTE — PROGRESS NOTES
Discharge Facility: [facility]   Discharge Diagnosis: UTI  Admission Date: 5/20/25  Discharge Date: 5/21/25    PCP Appointment Date: 6/3/25  Specialist Appointment Date: 5/28/25- Repeat CBC / BMP  Hospital Encounter and Summary Linked: ED to Hosp-Admission (Discharged) with Monica Gibson MD (05/19/2025)   Discharge Summary by Monica Gibson MD (05/21/2025 10:21)   See discharge assessment below for further details     Wrap Up  Wrap Up Additional Comments: Pt. Would like a referral to urology team, she does have a urologist currently, but would like to seek a different specialist. Pt. Denies any adverse effects from Cipro, or Flomax. She denies fever/chills n/v/diarrhea. Denies further questions/concerns at this time. This callers contact information for non-emergent questions/concerns. (5/22/2025 10:35 AM)    Engagement  Call Start Time: -- (Call completed with Sofya) (5/22/2025 10:35 AM)    Medications  Medications reviewed with patient/caregiver?: Yes (5/22/2025 10:35 AM)  Is the patient having any side effects they believe may be caused by any medication additions or changes?: No (5/22/2025 10:35 AM)  Does the patient have all medications ordered at discharge?: Yes (5/22/2025 10:35 AM)  Care Management Interventions: Provided patient education (5/22/2025 10:35 AM)  Prescription Comments: START taking: ciprofloxacin (Cipro) tamsulosin (Flomax)  STOP taking: docusate sodium 100 mg capsule (Colace) estradiol 0.01 % (0.1 mg/gram) vaginal cream (Estrace) methocarbamol 750 mg tablet (Robaxin) ondansetron 4 mg tablet (Zofran) phenazopyridine 200 mg tablet (Pyridium) sulfamethoxazole-trimethoprim 800-160 mg tablet (Bactrim DS) (5/22/2025 10:35 AM)  Medication Comments: ciprofloxacin 500 mg tablet  Take 1 tablet (500 mg) by mouth 2 times a day for 3 days- (5/22/2025 10:35 AM)    Appointments  Does the patient have a primary care provider?: Yes (5/22/2025 10:35 AM)  Care Management Interventions: Verified appointment  date/time/provider; Educated patient on importance of making appointment (5/22/2025 10:35 AM)  Has the patient kept scheduled appointments due by today?: Yes (5/22/2025 10:35 AM)  Care Management Interventions: Advised patient to keep appointment; Advised to schedule with specialist (5/22/2025 10:35 AM)    Self Management  What is the home health agency?: n/a (5/22/2025 10:35 AM)  Has home health visited the patient within 72 hours of discharge?: Not applicable (5/22/2025 10:35 AM)  What Durable Medical Equipment (DME) was ordered?: n/a (5/22/2025 10:35 AM)    Patient Teaching  Does the patient have access to their discharge instructions?: Yes (5/22/2025 10:35 AM)  Care Management Interventions: Reviewed instructions with patient (5/22/2025 10:35 AM)  What is the patient's perception of their health status since discharge?: Improving (5/22/2025 10:35 AM)  Is the patient/caregiver able to teach back the hierarchy of who to call/visit for symptoms/problems? PCP, Specialist, Home Health nurse, Urgent Care, ED, 911: Yes (5/22/2025 10:35 AM)  Patient/Caregiver Education Comments: Pt. denies new concerns post hosital discharge. (5/22/2025 10:35 AM)

## 2025-05-23 ENCOUNTER — PATIENT OUTREACH (OUTPATIENT)
Dept: CARE COORDINATION | Age: 65
End: 2025-05-23
Payer: MEDICARE

## 2025-05-23 ENCOUNTER — TELEPHONE (OUTPATIENT)
Dept: PRIMARY CARE | Facility: CLINIC | Age: 65
End: 2025-05-23
Payer: MEDICARE

## 2025-05-23 ENCOUNTER — TELEPHONE (OUTPATIENT)
Dept: UROLOGY | Facility: CLINIC | Age: 65
End: 2025-05-23
Payer: MEDICARE

## 2025-05-23 DIAGNOSIS — N39.0 ACUTE UTI: Primary | ICD-10-CM

## 2025-05-23 RX ORDER — AMOXICILLIN AND CLAVULANATE POTASSIUM 875; 125 MG/1; MG/1
875 TABLET, FILM COATED ORAL 2 TIMES DAILY
Qty: 20 TABLET | Refills: 0 | Status: SHIPPED | OUTPATIENT
Start: 2025-05-23 | End: 2025-06-02

## 2025-05-23 SDOH — ECONOMIC STABILITY: HOUSING INSECURITY: AT ANY TIME IN THE PAST 12 MONTHS, WERE YOU HOMELESS OR LIVING IN A SHELTER (INCLUDING NOW)?: NO

## 2025-05-23 SDOH — ECONOMIC STABILITY: FOOD INSECURITY: WITHIN THE PAST 12 MONTHS, YOU WORRIED THAT YOUR FOOD WOULD RUN OUT BEFORE YOU GOT THE MONEY TO BUY MORE.: NEVER TRUE

## 2025-05-23 SDOH — ECONOMIC STABILITY: HOUSING INSECURITY: IN THE LAST 12 MONTHS, WAS THERE A TIME WHEN YOU WERE NOT ABLE TO PAY THE MORTGAGE OR RENT ON TIME?: NO

## 2025-05-23 SDOH — ECONOMIC STABILITY: TRANSPORTATION INSECURITY: IN THE PAST 12 MONTHS, HAS LACK OF TRANSPORTATION KEPT YOU FROM MEDICAL APPOINTMENTS OR FROM GETTING MEDICATIONS?: NO

## 2025-05-23 SDOH — ECONOMIC STABILITY: FOOD INSECURITY: WITHIN THE PAST 12 MONTHS, THE FOOD YOU BOUGHT JUST DIDN'T LAST AND YOU DIDN'T HAVE MONEY TO GET MORE.: NEVER TRUE

## 2025-05-23 SDOH — ECONOMIC STABILITY: FOOD INSECURITY: HOW HARD IS IT FOR YOU TO PAY FOR THE VERY BASICS LIKE FOOD, HOUSING, MEDICAL CARE, AND HEATING?: NOT HARD AT ALL

## 2025-05-23 ASSESSMENT — ACTIVITIES OF DAILY LIVING (ADL): LACK_OF_TRANSPORTATION: NO

## 2025-05-23 ASSESSMENT — ENCOUNTER SYMPTOMS
LOSS OF SENSATION IN FEET: 0
DEPRESSION: 0
OCCASIONAL FEELINGS OF UNSTEADINESS: 0

## 2025-05-23 NOTE — TELEPHONE ENCOUNTER
Patient left  on nurse line, call returned, spoke with patient. Patient reporting pain to kidney. Patient denying fevers and chills. Patient advised to try tylenol/ibuprofen for pain per Dr. Poe. Patient informed if pain gets worse, or fevers/chills develops to go to ER. Patient voices understanding.

## 2025-05-23 NOTE — PROGRESS NOTES
Spoke with patient regarding enrollment in [facility]. Reviewed program expectations. Verbalized understanding.    Enrollment Statusenrolled    Fall Risk Factors: none    has not had any episodes of incontinence    Initial [facility] scheduled:    Services Needed: Medication Management , review left flank pain.    Pt. Had recent admission discharge diagnosis UTI. Pt. States left flank pain since being home. Called PCP, no changes to current regimen. Pt. Taking cipro for UTI; only other medications currant are: Depakote, magnesium, cipro. Pt. States she is holding on all other medications until problem is resolved.

## 2025-05-24 ENCOUNTER — TELEMEDICINE CLINICAL SUPPORT (OUTPATIENT)
Dept: CARE COORDINATION | Age: 65
End: 2025-05-24
Payer: MEDICARE

## 2025-05-24 ENCOUNTER — APPOINTMENT (OUTPATIENT)
Dept: CARE COORDINATION | Age: 65
End: 2025-05-24
Payer: MEDICARE

## 2025-05-24 ENCOUNTER — PATIENT OUTREACH (OUTPATIENT)
Dept: CARE COORDINATION | Age: 65
End: 2025-05-24

## 2025-05-24 DIAGNOSIS — R31.9 URINARY TRACT INFECTION WITH HEMATURIA, SITE UNSPECIFIED: Primary | ICD-10-CM

## 2025-05-24 DIAGNOSIS — N39.0 URINARY TRACT INFECTION WITH HEMATURIA, SITE UNSPECIFIED: Primary | ICD-10-CM

## 2025-05-24 DIAGNOSIS — G40.909 SEIZURE DISORDER (MULTI): ICD-10-CM

## 2025-05-24 DIAGNOSIS — N28.9 ABNORMAL KIDNEY FUNCTION: ICD-10-CM

## 2025-05-24 NOTE — PROGRESS NOTES
" [facility] Visit     Admission Date: 5/19/25  Discharge Date: 5/21/25  Discharging Facility:  [facility] Visit: initial     Brief summary of hospitalization or reason for referral:   PMH of Epilepsy,HLP,  Hypothyroidism, Nephrolithiasis, GLENIS, Pneumonia, Urinary tract infection, hematuria who presented to [facility] ED on 5/19/2025 for L flank pain.  Pt had Lt Ureteroscopy for L Renal calci, per pt stone had resolved, she states no stent placed w/ procedure on  5/16.     Per urology \"elevated Cr, hematuria, flank pain, positive UA, and CT only noting hydronephrosis, all may be due to a UTI causing backflow  - Restart diet  - Pain control  - Continue course of ABX at home  - Repeat BMP in 1 week to monitor Cr\"    Interval Subjective:   Notes bladder pressure, maybe not completely emptying her bladder mostly at night. Denies any fevers, diaphoresis, chest pain, palpitations, shortness of breath, back pain, abdominal pain, nausea, vomiting, numbness or tingling. Notes a headache this morning.     Masimo: No  Oxygen: No    Interval or Pertinent Labs/Testing:  Lab Review:   Hemoglobin A1C   Date/Time Value Ref Range Status   01/21/2025 03:07 PM 5.3 See comment % Final       Lab Results   Component Value Date     05/21/2025     05/20/2025     05/20/2025     04/10/2025    K 4.6 05/21/2025    K 4.5 05/20/2025    K 4.2 05/20/2025    K 4.8 04/10/2025    CO2 25 05/21/2025    CO2 26 05/20/2025    CO2 23 05/20/2025    CO2 25 04/10/2025    BUN 21 05/21/2025    BUN 21 05/20/2025    BUN 20 05/20/2025    BUN 20 04/10/2025    CREATININE 1.36 (H) 05/21/2025    CREATININE 1.65 (H) 05/20/2025    CREATININE 1.64 (H) 05/20/2025    CREATININE 0.75 04/10/2025    CREATININE 0.68 03/25/2025    GLUCOSE 91 05/21/2025    GLUCOSE 105 (H) 05/20/2025    GLUCOSE 82 05/20/2025    GLUCOSE 112 (H) 04/10/2025    CALCIUM 8.8 05/21/2025    CALCIUM 9.3 05/20/2025    CALCIUM 9.0 05/20/2025    CALCIUM " 10.4 04/10/2025        Social Determinants of Health:  Medication Finances:  No issues   Transportation:  No Issues  Access to Food:  No issues    Medications Issues/Refill:  N/a    Assessment/Plan  1)  Complicated UTI   -  Augmentin. Unable to take Cipro due to epilepsy   - CBC around 5/28/25    2)  Elevated Cr  -  Repeat renal panel around 5/28/25  - avoid nephrotoxic agents when possible    Upcoming Appointments:   6/3/25 & 7/9/25 PCP  8/13/25: urology  10/16/25: Nephrology  11/18/25: OBGYN    Telemedicine Visit:  Patient Location during Visit:  [place]  Visit Modality:  Telephone  Additional Visit Participants:  None [facility] JABIER Rose  Patient/Proxy verbally consents to Evaluation and Treatment

## 2025-05-24 NOTE — PROGRESS NOTES
Daily Call Note:     [facility] call with patient, Fela NP, Nurse  Pt states she is feeling really good today, she doesn't have pain  She still having some urinary changes, pressure at night  Denies any fever, chills  Pt continues with Augmentin  Pt advised to check her BP a couple times a week  No issues with affording meds, access to transportation or food insecurities  Changed calls to T/TH (this upcoming Tues, 5/27 pt would like an evening call)  Next [facility] 5/29 @ 1000        Pt Education:   Barriers:  Topics for Daily Review:   Pt demonstrates clear understanding:    Daily Weight:  There were no vitals filed for this visit.   Last 3 Weights:  Wt Readings from Last 7 Encounters:   05/19/25 90.3 kg (199 lb)   05/16/25 91.6 kg (201 lb 14.4 oz)   05/07/25 90.3 kg (199 lb 1.2 oz)   04/17/25 95.7 kg (211 lb)   03/27/25 94.3 kg (208 lb)   03/25/25 94.5 kg (208 lb 5.4 oz)   02/27/25 94.2 kg (207 lb 10.8 oz)       Masimo Device:    Masimo Clinical Impression:     Virtual Visits--Scheduled (Most Recent Date at Top)  Follow up Appointments  Recent Visits  No visits were found meeting these conditions.  Showing recent visits within past 30 days and meeting all other requirements  Future Appointments  Date Type Provider Dept   06/03/25 Appointment DUANE [id number]   07/09/25 Appointment Princess Aguiar PA-C [place] [id number] [facility]   Showing future appointments within next 90 days and meeting all other requirements       Frequency of RN Calls & Virtual Visits per Team Agreement:    Medication issues Addressed (what was done):     Follow up appointments scheduled by [facility] Staff:  Referrals made by [facility] staff:

## 2025-05-27 ENCOUNTER — OFFICE VISIT (OUTPATIENT)
Dept: PRIMARY CARE | Facility: CLINIC | Age: 65
End: 2025-05-27
Payer: MEDICARE

## 2025-05-27 ENCOUNTER — PATIENT OUTREACH (OUTPATIENT)
Dept: CARE COORDINATION | Age: 65
End: 2025-05-27

## 2025-05-27 DIAGNOSIS — N17.9 AKI (ACUTE KIDNEY INJURY): Primary | ICD-10-CM

## 2025-05-27 DIAGNOSIS — Z09 HOSPITAL DISCHARGE FOLLOW-UP: Primary | ICD-10-CM

## 2025-05-27 DIAGNOSIS — G40.909 NONINTRACTABLE EPILEPSY WITHOUT STATUS EPILEPTICUS, UNSPECIFIED EPILEPSY TYPE (MULTI): ICD-10-CM

## 2025-05-27 PROCEDURE — 1159F MED LIST DOCD IN RCRD: CPT | Performed by: PHYSICIAN ASSISTANT

## 2025-05-27 PROCEDURE — 1111F DSCHRG MED/CURRENT MED MERGE: CPT | Performed by: PHYSICIAN ASSISTANT

## 2025-05-27 PROCEDURE — 99496 TRANSJ CARE MGMT HIGH F2F 7D: CPT | Performed by: PHYSICIAN ASSISTANT

## 2025-05-27 PROCEDURE — 1160F RVW MEDS BY RX/DR IN RCRD: CPT | Performed by: PHYSICIAN ASSISTANT

## 2025-05-27 PROCEDURE — 1036F TOBACCO NON-USER: CPT | Performed by: PHYSICIAN ASSISTANT

## 2025-05-27 ASSESSMENT — ENCOUNTER SYMPTOMS
HEMATURIA: 0
ARTHRALGIAS: 0
FLANK PAIN: 0
NEUROLOGICAL NEGATIVE: 1
CONSTITUTIONAL NEGATIVE: 1
MUSCULOSKELETAL NEGATIVE: 1
ALLERGIC/IMMUNOLOGIC NEGATIVE: 1
FREQUENCY: 0
DIZZINESS: 0
RESPIRATORY NEGATIVE: 1
VOMITING: 0
ENDOCRINE NEGATIVE: 1
NAUSEA: 0
DYSURIA: 0
PALPITATIONS: 0
PSYCHIATRIC NEGATIVE: 1
HEMATOLOGIC/LYMPHATIC NEGATIVE: 1
ABDOMINAL PAIN: 0
WHEEZING: 0
HEADACHES: 0
DIFFICULTY URINATING: 0
NERVOUS/ANXIOUS: 0
COUGH: 0
BACK PAIN: 0
SHORTNESS OF BREATH: 0
EYES NEGATIVE: 1

## 2025-05-27 NOTE — PROGRESS NOTES
"Patient: Sofya Dean  : 1960  PCP: Princess Aguiar PA-C  MRN: [id number]  Program: Transitional Care Management  Status: Enrolled  Effective Dates: 2025 - present  Responsible Staff: Steve August LPN  Social Drivers to be Addressed: No information to display    [facility]  Status: Enrolled  Effective Dates: 2025 - present  Responsible Staff: [facility]  Social Drivers to be Addressed: No information to display         Sofya Dean is a 65 y.o. female presenting today for follow-up after being discharged from the hospital 6 days ago. The main problem requiring admission was uti . The discharge summary and/or Transitional Care Management documentation was reviewed. Medication reconciliation was performed as indicated via the \"[place] as Reviewed\" timestamp.     Sofya Dean is a 65 y.o. female, with a PMH of Epilepsy,HLP,  Hypothyroidism, Nephrolithiasis, GLENIS, Pneumonia, Urinary tract infection , hematuria who presented to [facility] ED on 2025 for L flank pain.  Pt had Lt Ureteroscopy for L Renal calci, per pt stone had resolved, she states no stent placed  w/ procedure on  .  Pt states felt better saturday, still had nausea intermittently pt states last night increase pian on Left side and increased blood in urine, pt states now it is pink tinge urine . On assessment pt denies  pain at this time. She denies fever or chills.  Doing better postop day 1  Seen and cleared by urology  Discharged home in stable condition on antibiotics   Started on cipro - then switched to Augmentin due to reaction with seizures   Doing well today -- no symptoms         Sofya Dean was contacted by Transitional Care Management services two days after her discharge. This encounter and supporting documentation was reviewed.    Review of Systems   Constitutional: Negative.    HENT: Negative.     Eyes: Negative.    Respiratory: Negative.  Negative for cough, shortness of breath and " wheezing.    Cardiovascular:  Negative for chest pain and palpitations.   Gastrointestinal:  Negative for abdominal pain, nausea and vomiting.   Endocrine: Negative.    Genitourinary:  Negative for difficulty urinating, dysuria, flank pain, frequency, genital sores, hematuria, pelvic pain and urgency.   Musculoskeletal: Negative.  Negative for arthralgias and back pain.   Skin: Negative.  Negative for rash.   Allergic/Immunologic: Negative.    Neurological: Negative.  Negative for dizziness and headaches.   Hematological: Negative.    Psychiatric/Behavioral: Negative.  The patient is not nervous/anxious.        There were no vitals taken for this visit.    Physical Exam  Neurological:      Mental Status: She is alert and oriented to person, place, and time.   Psychiatric:         Behavior: Behavior normal.         Thought Content: Thought content normal.         Judgment: Judgment normal.         The complexity of medical decision making for this patient's transitional care is moderate.    Assessment/Plan   Problem List Items Addressed This Visit    None  Visit Diagnoses         Hospital discharge follow-up    -  Primary          Doing well on Augmentin   Feeling well improving -- no symptoms for 2 days   Continue to monitor   Follow up with nephrology and urology as needed   Labs also to be repeated tomorrow     complexity visit of 30+  of the time discussing, clinical findings, impression and diagnoses discussed with the patient as well as results of the latest test/lab work. The patient was in agreement with the plan and verbalized a good understanding of instructions and plans for treatment. At the end of the visit today, the patient felt that all questions had been answered satisfactorily. The patient was pleased with the visit and very appreciative for the care rendered.

## 2025-05-28 DIAGNOSIS — N39.0 URINARY TRACT INFECTION WITH HEMATURIA, SITE UNSPECIFIED: Primary | ICD-10-CM

## 2025-05-28 DIAGNOSIS — R30.0 DYSURIA: ICD-10-CM

## 2025-05-28 DIAGNOSIS — R35.0 FREQUENCY OF URINATION: ICD-10-CM

## 2025-05-28 DIAGNOSIS — R39.15 URGENCY OF URINATION: ICD-10-CM

## 2025-05-28 DIAGNOSIS — N13.30 HYDRONEPHROSIS OF LEFT KIDNEY: ICD-10-CM

## 2025-05-28 DIAGNOSIS — E78.2 MIXED HYPERLIPIDEMIA: ICD-10-CM

## 2025-05-28 DIAGNOSIS — R31.9 URINARY TRACT INFECTION WITH HEMATURIA, SITE UNSPECIFIED: Primary | ICD-10-CM

## 2025-05-28 LAB
ANION GAP SERPL CALCULATED.4IONS-SCNC: 12 MMOL/L (CALC) (ref 7–17)
BUN SERPL-MCNC: 20 MG/DL (ref 7–25)
BUN/CREAT SERPL: 15 (CALC) (ref 6–22)
CALCIUM SERPL-MCNC: 10.1 MG/DL (ref 8.6–10.4)
CHLORIDE SERPL-SCNC: 101 MMOL/L (ref 98–110)
CO2 SERPL-SCNC: 25 MMOL/L (ref 20–32)
CREAT SERPL-MCNC: 1.33 MG/DL (ref 0.5–1.05)
EGFRCR SERPLBLD CKD-EPI 2021: 44 ML/MIN/1.73M2
ERYTHROCYTE [DISTWIDTH] IN BLOOD BY AUTOMATED COUNT: 13.5 % (ref 11–15)
GLUCOSE SERPL-MCNC: 98 MG/DL (ref 65–99)
HCT VFR BLD AUTO: 37.6 % (ref 35–45)
HGB BLD-MCNC: 12.1 G/DL (ref 11.7–15.5)
MCH RBC QN AUTO: 30 PG (ref 27–33)
MCHC RBC AUTO-ENTMCNC: 32.2 G/DL (ref 32–36)
MCV RBC AUTO: 93.1 FL (ref 80–100)
PLATELET # BLD AUTO: 291 THOUSAND/UL (ref 140–400)
PMV BLD REES-ECKER: 11.2 FL (ref 7.5–12.5)
POTASSIUM SERPL-SCNC: 4.9 MMOL/L (ref 3.5–5.3)
RBC # BLD AUTO: 4.04 MILLION/UL (ref 3.8–5.1)
SODIUM SERPL-SCNC: 138 MMOL/L (ref 135–146)
WBC # BLD AUTO: 7.8 THOUSAND/UL (ref 3.8–10.8)

## 2025-05-28 NOTE — PROGRESS NOTES
Daily Call Note:   Daily call completed. Patient stated that this is the second time in two and a half weeks that she has felt this good. Continues to have minor discharge. Denies any discomfort at this time. Continues to take Augmentin as prescribed. BP slightly elevated today 147/86. Asymptomatic. Stated that she has been hanging out in the low 140s (systolic).  She stated doctor aware. No medication needs or issues at this time.  Pt Education: Complete all ABT  Barriers: None  Topics for Daily Review: Continue to monitor blood pressure BID  Pt demonstrates clear understanding: Yes    Daily Weight:  There were no vitals filed for this visit.   Last 3 Weights:  Wt Readings from Last 7 Encounters:   05/19/25 90.3 kg (199 lb)   05/16/25 91.6 kg (201 lb 14.4 oz)   05/07/25 90.3 kg (199 lb 1.2 oz)   04/17/25 95.7 kg (211 lb)   03/27/25 94.3 kg (208 lb)   03/25/25 94.5 kg (208 lb 5.4 oz)   02/27/25 94.2 kg (207 lb 10.8 oz)       Masimo Device: No   Masimo Clinical Impression: N/A    Virtual Visits--Scheduled (Most Recent Date at Top)  Follow up Appointments  Recent Visits  No visits were found meeting these conditions.  Showing recent visits within past 30 days and meeting all other requirements  Today's Visits  Date Type Provider Dept   05/27/25 Office Visit Princess Aguiar PA-C [facility] [id number]   Showing today's visits and meeting all other requirements  Future Appointments  Date Type Provider Dept   06/03/25 Appointment Princess Aguiar PA-C Do [id number]   07/09/25 Appointment DUANE [id number] [facility]   Showing future appointments within next 90 days and meeting all other requirements       Frequency of RN Calls & Virtual Visits per Team Agreement: [facility] Frequency: T Th    Medication issues Addressed (what was done): None    Follow up appointments scheduled by [facility] Staff: None  Referrals made by [facility] staff: None

## 2025-05-29 ENCOUNTER — TELEMEDICINE (OUTPATIENT)
Dept: PHARMACY | Facility: HOSPITAL | Age: 65
End: 2025-05-29
Payer: MEDICARE

## 2025-05-29 ENCOUNTER — PATIENT OUTREACH (OUTPATIENT)
Dept: CARE COORDINATION | Age: 65
End: 2025-05-29

## 2025-05-29 ENCOUNTER — APPOINTMENT (OUTPATIENT)
Dept: CARE COORDINATION | Age: 65
End: 2025-05-29
Payer: MEDICARE

## 2025-05-29 DIAGNOSIS — N39.0 URINARY TRACT INFECTION WITH HEMATURIA, SITE UNSPECIFIED: ICD-10-CM

## 2025-05-29 DIAGNOSIS — E78.2 MIXED HYPERLIPIDEMIA: ICD-10-CM

## 2025-05-29 DIAGNOSIS — N28.9 ABNORMAL KIDNEY FUNCTION: ICD-10-CM

## 2025-05-29 DIAGNOSIS — R31.9 URINARY TRACT INFECTION WITH HEMATURIA, SITE UNSPECIFIED: Primary | ICD-10-CM

## 2025-05-29 DIAGNOSIS — R31.9 URINARY TRACT INFECTION WITH HEMATURIA, SITE UNSPECIFIED: ICD-10-CM

## 2025-05-29 DIAGNOSIS — N39.0 URINARY TRACT INFECTION WITH HEMATURIA, SITE UNSPECIFIED: Primary | ICD-10-CM

## 2025-05-29 NOTE — PROGRESS NOTES
Daily Call Note: [facility] weekly call complete w this RN, Fabien Pharm D, and Deandre NP   [facility] 2nd visit via phone   Denies CP/SOB/ edema  3rd day no pain  Urinating well, slight hematuria   Slight abdominal pressure, however improving   Recent labs reviewed  Nephrology visit tomorrow to review recent labs   Continues on antibiotics as ordered   B/P 157/77  HR 84   Temp 5/26 99.1  All concerns addressed  Scheduled next weekly [facility] visit  6/5 at 1030- telephonic    Pt Education:  POC   Barriers: n/a   Topics for Daily Review:   Pt demonstrates clear understanding: Yes    Daily Weight:  There were no vitals filed for this visit.   Last 3 Weights:  Wt Readings from Last 7 Encounters:   05/19/25 90.3 kg (199 lb)   05/16/25 91.6 kg (201 lb 14.4 oz)   05/07/25 90.3 kg (199 lb 1.2 oz)   04/17/25 95.7 kg (211 lb)   03/27/25 94.3 kg (208 lb)   03/25/25 94.5 kg (208 lb 5.4 oz)   02/27/25 94.2 kg (207 lb 10.8 oz)       Masimo Device: No   Masimo Clinical Impression:     Virtual Visits--Scheduled (Most Recent Date at Top)  Follow up Appointments  Recent Visits  Date Type Provider Dept   05/27/25 Office Visit Princess Aguiar PA-C Do [id number]   Showing recent visits within past 30 days and meeting all other requirements  Future Appointments  Date Type Provider Dept   06/03/25 Appointment Princess Aguiar PA-C Do [id number]   07/09/25 Appointment Princess Aguiar PA-C Do [id number]   Showing future appointments within next 90 days and meeting all other requirements       Frequency of RN Calls & Virtual Visits per Team Agreement: [facility] Frequency: Bi-Weekly    Medication issues Addressed (what was done):     Follow up appointments scheduled by [facility] Staff:   Referrals made by [facility] staff:

## 2025-05-29 NOTE — PROGRESS NOTES
" Healthy at Home Virtual Visit      Admission Date: 5/19/25  Discharge Date: 5/21/25  Discharging Facility:  [facility] Visit: 2     Brief summary of hospitalization or reason for referral:   PMH of Epilepsy,HLP,  Hypothyroidism, Nephrolithiasis, GLENIS, Pneumonia, Urinary tract infection, hematuria who presented to [facility] ED on 5/19/2025 for L flank pain.  Pt had Lt Ureteroscopy for L Renal calci, per pt stone had resolved, she states no stent placed w/ procedure on  5/16.      Per urology \"elevated Cr, hematuria, flank pain, positive UA, and CT only noting hydronephrosis, all may be due to a UTI causing backflow  - Restart diet  - Pain control  - Continue course of ABX at home  - Repeat BMP in 1 week to monitor Cr\"     Interval Subjective:     Has been feeling good for the last 3 days  Reports pecks of blood in urine, which is improvement  Bladder pressure improved  Had a temp of 99.1 on Monday  Denies diaphoresis, chest pain, palpitations, shortness of breath, back pain, abdominal pain, nausea, vomiting, numbness or tingling.      BP: 157/77  HR: 84  Masimo: No  Oxygen: No     Interval or Pertinent Labs/Testing:  Lab Review:         Hemoglobin A1C   Date/Time Value Ref Range Status   01/21/2025 03:07 PM 5.3 See comment % Final               Lab Results   Component Value Date      05/21/2025      05/20/2025      05/20/2025      04/10/2025     K 4.6 05/21/2025     K 4.5 05/20/2025     K 4.2 05/20/2025     K 4.8 04/10/2025     CO2 25 05/21/2025     CO2 26 05/20/2025     CO2 23 05/20/2025     CO2 25 04/10/2025     BUN 21 05/21/2025     BUN 21 05/20/2025     BUN 20 05/20/2025     BUN 20 04/10/2025     CREATININE 1.36 (H) 05/21/2025     CREATININE 1.65 (H) 05/20/2025     CREATININE 1.64 (H) 05/20/2025     CREATININE 0.75 04/10/2025     CREATININE 0.68 03/25/2025     GLUCOSE 91 05/21/2025     GLUCOSE 105 (H) 05/20/2025     GLUCOSE 82 05/20/2025     GLUCOSE 112 (H) 04/10/2025 "     CALCIUM 8.8 05/21/2025     CALCIUM 9.3 05/20/2025     CALCIUM 9.0 05/20/2025     CALCIUM 10.4 04/10/2025         Social Determinants of Health:  Medication Finances:  No issues   Transportation:  No Issues  Access to Food:  No issues     Medications Issues/Refill:  N/a     Assessment/Plan  1)  Complicated UTI   - Started Augmentin on May 23rd x 10 days  - Unable to take Cipro due to epilepsy   - CBC around 5/28/25     2)  Elevated Cr  - Repeat renal panel first week of June  - avoid nephrotoxic agents when possible     Upcoming Appointments:   6/3/25 & 7/9/25 PCP  8/13/25: Urology  10/16/25: Nephrology  11/18/25: OBGYN     Telemedicine Visit:  Patient Location during Visit:  [place]  Visit Modality:  Telephone  Additional Visit Participants:  None [facility] Fabien Marshall (PharmD)  Patient/Proxy verbally consents to Evaluation and Treatment     Kay Zhu APRN, CNP  [facility]

## 2025-05-30 ENCOUNTER — TELEMEDICINE (OUTPATIENT)
Dept: UROLOGY | Facility: CLINIC | Age: 65
End: 2025-05-30
Payer: MEDICARE

## 2025-05-30 DIAGNOSIS — R79.89 ELEVATED SERUM CREATININE: Primary | ICD-10-CM

## 2025-05-30 DIAGNOSIS — N20.0 NEPHROLITHIASIS: ICD-10-CM

## 2025-05-30 DIAGNOSIS — N20.0 URINARY TRACT OBSTRUCTION BY KIDNEY STONE: ICD-10-CM

## 2025-05-30 DIAGNOSIS — N13.8 URINARY TRACT OBSTRUCTION BY KIDNEY STONE: ICD-10-CM

## 2025-05-30 PROCEDURE — G2211 COMPLEX E/M VISIT ADD ON: HCPCS | Performed by: UROLOGY

## 2025-05-30 PROCEDURE — 99213 OFFICE O/P EST LOW 20 MIN: CPT | Performed by: UROLOGY

## 2025-05-30 NOTE — PROGRESS NOTES
Virtual or Telephone Consent    An interactive audio and video telecommunication system which permits real time communications between the patient (at the originating site) and provider (at the distant site) was utilized to provide this telehealth service.   Verbal consent was requested and obtained from Sofya Dean on this date, 05/30/25 for a telehealth visit and the patient's location was confirmed at the time of the visit.      Last visit 5/19/25  Cystoscopy, Left Retrograde Pyelogram with interpretation, left ureteral Catheterization, Left diagnostic Ureteroscopy   Flank pain after diagnostic URS, improving  Stat labs and imaging as below  Discussed warning signs in detail  Renal US  Urine cx  Cbc/chem7    Today's visit:  Patient is here for hospital follow up.  She was hospitalized for flank pain and gross hematuria concerns.  Diagnosed with a UTI negative UCx.  I personally reviewed the patients imaging and labs.  She is doing well now, 100% better.   Denies any burning, gross hematuria concerns.  -pain controlled, No fevers/chills/nausea/vomiting  -urinating ok  -creatinine levels are high        Labs  Lab Results   Component Value Date    HGBA1C 5.3 01/21/2025     Lab Results   Component Value Date    HCT 37.6 05/27/2025       Lab Results   Component Value Date    CREATININE 1.33 (H) 05/27/2025    CREATININE 1.36 (H) 05/21/2025    CREATININE 1.65 (H) 05/20/2025    CREATININE 1.64 (H) 05/20/2025    CREATININE 1.56 (H) 05/19/2025       Narrative & Impression   Interpreted By:  Cliff Ray,   STUDY:  CT ABDOMEN PELVIS WO IV CONTRAST;  5/20/2025 2:55 am - reviewed/interpreted:      IMPRESSION:  Left hydronephrosis and hydroureter. Small nonobstructive left renal  calculi. There is however no evidence of obstructive ureteral  calculus. Focus of air in the proximal left ureter and focus of air  in the urinary may relate to recent procedure, however clinical  correlation is recommended. Clinical correlation  with urinalysis for  upper urinary tract infection is also recommended and follow-up  imaging to assure resolution of the hydronephrosis and exclude other  underlying pathology.           PMH:  Medical History[1]     PSH:  Surgical History[2]     Medications:  Current Medications[3]    Allergy:  Allergies[4]     Exam  CONSTITUTIONAL:        No acute distress    HEAD:        Normocephalic and atraumatic    CHEST / RESPIRATORY      no excess work of breathing, no respiratory distress,    ABDOMEN / GASTROINTESTINAL:        Abdomen nondistended      Renal US:      Interpreted By:  Abdirahman Patel,   STUDY:  US RENAL COMPLETE;  5/19/2025 11:10 pm      INDICATION:  Signs/Symptoms:L flank pain and hematoma after cystoscopy.          COMPARISON:  March 2025        FINDINGS:  Right kidney measures 10 cm. Left kidney measures 12 cm. Mild  left-sided hydronephrosis detected. Within the bladder there is a  soft tissue nodule measuring 1.3 by 1 cm probably edematous an UVJ or  urinary trigone.      No renal stones.      IMPRESSION:  Mild left-sided hydronephrosis. No renal stones. Soft tissue  prominence seen along the left side of the urinary bladder presumably  some swelling seen at the ureteral vesicular junction. Mass felt to  be unlikely given the patient's recent cystoscopy.        Assessment/Plan  #Nephrolithiasis Left Hydronephrosis/ left Flank pain , hospitalization  - Patient doing well, pain controlled  -Discussed imaging and creatinine trends- improving.  -Ordered repeat creatinine levels in 2 weeks.  -Discussed stricture is a possibility and she will need a repeat US, however we will get labs first to determine the timing of the US.  -Warning signs reviewed.      FU in 2 weeks with labs    G 2211  Visit complexity inherent to evaluation and management (E&M) associated with medical care services that serve as the continuing focal point for all needed health care services and/or with medical care services that are part  of ongoing care related to a patient's single, serious condition or a complex condition.    Scribe Attestation  By signing my name below, I, Lakisha Enio Zhou attest that this documentation has been prepared under the direction and in the presence of Cristina Poe MD. All medical record entries made by the Scribe were at my direction or personally dictated by me. I have reviewed the chart and agree that the record accurately reflects my personal performance of the history, physical exam, discussion and plan.         [1]   Past Medical History:  Diagnosis Date    Abnormal Pap smear of cervix     Allergic DURICEF    Colon polyp     Difficult intubation 03/04/2024    Difficult mask: Oral airway and 2 hand required to BMV. Grade 3 view with mas. 2B with glidescope.    Epilepsy     Fractures     back, knee    Hyperlipidemia     Hypothyroidism     Nephrolithiasis     GLENIS (obstructive sleep apnea)     Pneumonia     Urinary tract infection     Varicella    [2]   Past Surgical History:  Procedure Laterality Date    AUGMENTATION MAMMAPLASTY  3-4-24    CERVICAL BIOPSY  W/ LOOP ELECTRODE EXCISION      COLONOSCOPY      COLONOSCOPY W/ POLYPECTOMY      COSMETIC SURGERY  IMPLANTS    Remote Breast augmentation/implants. Implant replacement 03/04/2024.    CYSTOSCOPY  december and january    ENDOMETRIAL ABLATION      EYE SURGERY      KIDNEY STONE SURGERY  dec. 5, 2024    TONSILLECTOMY     [3]   Current Outpatient Medications:     acetaminophen (Tylenol) 325 mg tablet, Take 2 tablets (650 mg) by mouth every 6 hours if needed for mild pain (1 - 3)., Disp: 30 tablet, Rfl: 0    amoxicillin-clavulanate (Augmentin) 875-125 mg tablet, Take 1 tablet (875 mg) by mouth 2 times a day for 10 days., Disp: 20 tablet, Rfl: 0    cholecalciferol (Vitamin D-3) 50 mcg (2,000 units) tablet, Take 1 tablet (50 mcg) by mouth once daily at bedtime., Disp: , Rfl:     co-enzyme Q-10 30 mg capsule, Take 1 capsule (30 mg) by mouth once daily in  the morning., Disp: , Rfl:     cyanocobalamin (Vitamin B-12) 1,000 mcg tablet, Take 1 tablet (1,000 mcg) by mouth once daily at bedtime., Disp: , Rfl:     divalproex (Depakote ER) 250 mg 24 hr tablet, Take 2 tablets (500 mg) by mouth once daily in the morning AND 1 tablet (250 mg) once daily in the evening. Do not crush, chew, or split.., Disp: 270 tablet, Rfl: 3    krill oil 500 mg capsule, Take 1 capsule (500 mg) by mouth once daily at bedtime., Disp: , Rfl:     magnesium oxide (Mag-Ox) 400 mg tablet, Take 1 tablet (400 mg) by mouth once daily., Disp: 90 tablet, Rfl: 3    multivitamin tablet, Take 1 tablet by mouth once daily at bedtime., Disp: , Rfl:     rosuvastatin (Crestor) 10 mg tablet, Take 1 tablet (10 mg) by mouth once daily. (Patient taking differently: Take 1 tablet (10 mg) by mouth once daily. 4-22-25 pt reports she only takes one tablet every Monday morning), Disp: 100 tablet, Rfl: 3    tamsulosin (Flomax) 0.4 mg 24 hr capsule, Take 1 capsule (0.4 mg) by mouth once daily., Disp: 30 capsule, Rfl: 0  [4]   Allergies  Allergen Reactions    Chlorpheniramine-Pseudoephed Hives    Iodine Hives and Swelling    Cefadroxil Itching and Rash

## 2025-06-03 ENCOUNTER — PATIENT OUTREACH (OUTPATIENT)
Dept: CARE COORDINATION | Age: 65
End: 2025-06-03

## 2025-06-03 ENCOUNTER — APPOINTMENT (OUTPATIENT)
Dept: PRIMARY CARE | Facility: CLINIC | Age: 65
End: 2025-06-03
Payer: MEDICARE

## 2025-06-03 DIAGNOSIS — N17.9 AKI (ACUTE KIDNEY INJURY): ICD-10-CM

## 2025-06-03 NOTE — PROGRESS NOTES
Daily Call Note: Call complete. Patient is doing good, feeling 100% back to normal. Denies any s/s of UTI, she denies frequency, urgency, and burning. She completed antibiotic therapy last night. She canceled her PCP appointment today because she had a virtual with her last week and her PCP said she could cancel if she was feeling better. She denies fever/chills. She did not check a BP or temp today, she has been out in the yard working all day. Next [facility] 6/5/25 at 1030, she verbalized understanding. No other questions at this time.     Pt Education: per POC  Barriers: none  Topics for daily review:  status  Pt demonstrates clear understanding: Yes    Daily Weight:  There were no vitals filed for this visit.   Last 3 Weights:  Wt Readings from Last 7 Encounters:   05/19/25 90.3 kg (199 lb)   05/16/25 91.6 kg (201 lb 14.4 oz)   05/07/25 90.3 kg (199 lb 1.2 oz)   04/17/25 95.7 kg (211 lb)   03/27/25 94.3 kg (208 lb)   03/25/25 94.5 kg (208 lb 5.4 oz)   02/27/25 94.2 kg (207 lb 10.8 oz)       Masimo Device: No   Masimo Clinical Impression: n/a    Virtual Visits--Scheduled (Most Recent Date at Top)  Follow up Appointments  Recent Visits  Date Type Provider Dept   05/27/25 Office Visit Princess Aguiar PA-C Do [id number]   Showing recent visits within past 30 days and meeting all other requirements  Future Appointments  Date Type Provider Dept   07/09/25 Appointment Princess Aguiar PA-C Do [id number]   Showing future appointments within next 90 days and meeting all other requirements       Frequency of RN Calls & Virtual Visits per Team Agreement: Healthy at Home Frequency: Bi-Weekly    Medication issues Addressed (what was done): none    Follow up appointments scheduled by [facility] Staff: none  Referrals made by [facility] staff: none

## 2025-06-05 ENCOUNTER — PATIENT OUTREACH (OUTPATIENT)
Dept: PRIMARY CARE | Facility: CLINIC | Age: 65
End: 2025-06-05

## 2025-06-05 ENCOUNTER — APPOINTMENT (OUTPATIENT)
Dept: PHARMACY | Facility: HOSPITAL | Age: 65
End: 2025-06-05
Payer: MEDICARE

## 2025-06-05 ENCOUNTER — APPOINTMENT (OUTPATIENT)
Dept: CARE COORDINATION | Age: 65
End: 2025-06-05
Payer: MEDICARE

## 2025-06-05 ENCOUNTER — PATIENT OUTREACH (OUTPATIENT)
Dept: CARE COORDINATION | Age: 65
End: 2025-06-05

## 2025-06-05 NOTE — PROGRESS NOTES
Unable to reach patient for follow up call after recent hospitalization.   Left voicemail with call back number for patient to call if needed  to assist with any questions or concerns patient may have.    Office Visit with Princess Agiuar PA-C (05/27/2025)

## 2025-06-08 ENCOUNTER — PATIENT OUTREACH (OUTPATIENT)
Dept: CARE COORDINATION | Age: 65
End: 2025-06-08
Payer: MEDICARE

## 2025-06-10 ENCOUNTER — PATIENT OUTREACH (OUTPATIENT)
Dept: CARE COORDINATION | Age: 65
End: 2025-06-10
Payer: MEDICARE

## 2025-06-10 NOTE — PROGRESS NOTES
Daily Call Note: [facility] daily call complete  Denies CP/SOB/Edema  Reports feeling well. No urinary symptoms.  Denies fevers/chill  Pt apologized for missing last [facility] call  Pt will graduate from [facility] today.    All questions/ concerns addressed      Pt Education:  POC   Barriers:  n/a   Topics for Daily Review:   Pt demonstrates clear understanding: Yes    Daily Weight:  There were no vitals filed for this visit.   Last 3 Weights:  Wt Readings from Last 7 Encounters:   05/19/25 90.3 kg (199 lb)   05/16/25 91.6 kg (201 lb 14.4 oz)   05/07/25 90.3 kg (199 lb 1.2 oz)   04/17/25 95.7 kg (211 lb)   03/27/25 94.3 kg (208 lb)   03/25/25 94.5 kg (208 lb 5.4 oz)   02/27/25 94.2 kg (207 lb 10.8 oz)       Masimo Device: No   Masimo Clinical Impression:     Virtual Visits--Scheduled (Most Recent Date at Top)  Follow up Appointments  Recent Visits  Date Type Provider Dept   05/27/25 Office Visit [email or web address]   Showing recent visits within past 30 days and meeting all other requirements  Future Appointments  Date Type Provider Dept   07/09/25 Appointment [email or web address]   Showing future appointments within next 90 days and meeting all other requirements       Frequency of RN Calls & Virtual Visits per Team Agreement: Healthy at Home Frequency: Bi-Weekly    Medication issues Addressed (what was done):     Follow up appointments scheduled by [facility] Staff:   Referrals made by [facility] staff:

## 2025-06-13 DIAGNOSIS — N20.0 URINARY TRACT OBSTRUCTION BY KIDNEY STONE: ICD-10-CM

## 2025-06-13 DIAGNOSIS — N20.0 NEPHROLITHIASIS: ICD-10-CM

## 2025-06-13 DIAGNOSIS — N13.8 URINARY TRACT OBSTRUCTION BY KIDNEY STONE: ICD-10-CM

## 2025-06-13 LAB
ALBUMIN SERPL-MCNC: 4.5 G/DL (ref 3.6–5.1)
ANION GAP SERPL CALCULATED.4IONS-SCNC: 12 MMOL/L (CALC) (ref 7–17)
BUN SERPL-MCNC: 20 MG/DL (ref 7–25)
BUN SERPL-MCNC: 21 MG/DL (ref 7–25)
BUN/CREAT SERPL: ABNORMAL (CALC) (ref 6–22)
BUN/CREAT SERPL: NORMAL (CALC) (ref 6–22)
CALCIUM SERPL-MCNC: 10.3 MG/DL (ref 8.6–10.4)
CALCIUM SERPL-MCNC: 10.5 MG/DL (ref 8.6–10.4)
CHLORIDE SERPL-SCNC: 103 MMOL/L (ref 98–110)
CHLORIDE SERPL-SCNC: 104 MMOL/L (ref 98–110)
CO2 SERPL-SCNC: 24 MMOL/L (ref 20–32)
CO2 SERPL-SCNC: 26 MMOL/L (ref 20–32)
CREAT SERPL-MCNC: 0.81 MG/DL (ref 0.5–1.05)
CREAT SERPL-MCNC: 0.92 MG/DL (ref 0.5–1.05)
EGFRCR SERPLBLD CKD-EPI 2021: 69 ML/MIN/1.73M2
EGFRCR SERPLBLD CKD-EPI 2021: 80 ML/MIN/1.73M2
GLUCOSE SERPL-MCNC: 94 MG/DL (ref 65–99)
GLUCOSE SERPL-MCNC: 95 MG/DL (ref 65–99)
PHOSPHATE SERPL-MCNC: 3.1 MG/DL (ref 2.1–4.3)
POTASSIUM SERPL-SCNC: 4.8 MMOL/L (ref 3.5–5.3)
POTASSIUM SERPL-SCNC: 4.8 MMOL/L (ref 3.5–5.3)
SODIUM SERPL-SCNC: 139 MMOL/L (ref 135–146)
SODIUM SERPL-SCNC: 140 MMOL/L (ref 135–146)

## 2025-06-18 ENCOUNTER — APPOINTMENT (OUTPATIENT)
Dept: RADIOLOGY | Facility: CLINIC | Age: 65
End: 2025-06-18
Payer: MEDICARE

## 2025-06-20 ENCOUNTER — HOSPITAL ENCOUNTER (OUTPATIENT)
Dept: RADIOLOGY | Facility: CLINIC | Age: 65
Discharge: HOME | End: 2025-06-20
Payer: MEDICARE

## 2025-06-20 DIAGNOSIS — R10.9 FLANK PAIN: ICD-10-CM

## 2025-06-20 DIAGNOSIS — N20.0 NEPHROLITHIASIS: ICD-10-CM

## 2025-06-20 PROCEDURE — 76770 US EXAM ABDO BACK WALL COMP: CPT | Performed by: RADIOLOGY

## 2025-06-20 PROCEDURE — 76770 US EXAM ABDO BACK WALL COMP: CPT

## 2025-07-01 ENCOUNTER — APPOINTMENT (OUTPATIENT)
Dept: UROLOGY | Facility: CLINIC | Age: 65
End: 2025-07-01
Payer: MEDICARE

## 2025-07-01 DIAGNOSIS — N20.0 NEPHROLITHIASIS: Primary | ICD-10-CM

## 2025-07-01 LAB
POC APPEARANCE, URINE: CLEAR
POC BILIRUBIN, URINE: NEGATIVE
POC BLOOD, URINE: ABNORMAL
POC COLOR, URINE: YELLOW
POC GLUCOSE, URINE: NEGATIVE MG/DL
POC KETONES, URINE: NEGATIVE MG/DL
POC LEUKOCYTES, URINE: NEGATIVE
POC NITRITE,URINE: NEGATIVE
POC PH, URINE: 6 PH
POC PROTEIN, URINE: NEGATIVE MG/DL
POC SPECIFIC GRAVITY, URINE: <=1.005
POC UROBILINOGEN, URINE: 0.2 EU/DL

## 2025-07-01 PROCEDURE — G2211 COMPLEX E/M VISIT ADD ON: HCPCS | Performed by: UROLOGY

## 2025-07-01 PROCEDURE — 99203 OFFICE O/P NEW LOW 30 MIN: CPT | Performed by: UROLOGY

## 2025-07-01 PROCEDURE — 81003 URINALYSIS AUTO W/O SCOPE: CPT | Performed by: UROLOGY

## 2025-07-01 PROCEDURE — 1036F TOBACCO NON-USER: CPT | Performed by: UROLOGY

## 2025-07-01 PROCEDURE — 1159F MED LIST DOCD IN RCRD: CPT | Performed by: UROLOGY

## 2025-07-01 NOTE — PROGRESS NOTES
Subjective   Sofya Dean is a 65 y.o. female presenting as a new patient today. Patient has history of nephrolithiasis and underwent a diagnostic ureteroscopy with Dr. Poe on 2025; no stone was found. Patient was seen by nephrology and is on a magnesium supplement. She reports she was told that she has elevated urinary oxalate, however, she believes this was due to a lab error and is repeating her 24 hour urine collection.      Today, she is asymptomatic. Denies any recent gross hematuria, fevers, chills, urinary retention, intractable flank or abdominal pain, nausea or vomiting.      Medical History[1]  Surgical History[2]  Family History[3]  Current Medications[4]  Allergies[5]  Social History     Socioeconomic History    Marital status:      Spouse name: Not on file    Number of children: Not on file    Years of education: Not on file    Highest education level: Not on file   Occupational History    Occupation: Retired   Tobacco Use    Smoking status: Former     Current packs/day: 0.00     Average packs/day: 0.5 packs/day for 10.0 years (5.0 ttl pk-yrs)     Types: Cigarettes     Quit date:      Years since quittin.5     Passive exposure: Current    Smokeless tobacco: Never   Vaping Use    Vaping status: Former   Substance and Sexual Activity    Alcohol use: Yes     Alcohol/week: 2.0 standard drinks of alcohol     Types: 2 Glasses of wine per week     Comment: social    Drug use: Never    Sexual activity: Yes     Partners: Male     Birth control/protection: Female Sterilization   Other Topics Concern    Not on file   Social History Narrative    Not on file     Social Drivers of Health     Financial Resource Strain: Low Risk  (2025)    Overall Financial Resource Strain (CARDIA)     Difficulty of Paying Living Expenses: Not hard at all   Food Insecurity: No Food Insecurity (2025)    Hunger Vital Sign     Worried About Running Out of Food in the Last Year: Never true      Ran Out of Food in the Last Year: Never true   Transportation Needs: No Transportation Needs (5/23/2025)    PRAPARE - Transportation     Lack of Transportation (Medical): No     Lack of Transportation (Non-Medical): No   Physical Activity: Insufficiently Active (12/5/2024)    Exercise Vital Sign     Days of Exercise per Week: 2 days     Minutes of Exercise per Session: 50 min   Stress: Not on file   Social Connections: Moderately Isolated (12/5/2024)    Social Connection and Isolation Panel [NHANES]     Frequency of Communication with Friends and Family: More than three times a week     Frequency of Social Gatherings with Friends and Family: Three times a week     Attends Yazdanism Services: Never     Active Member of Clubs or Organizations: No     Attends Club or Organization Meetings: Never     Marital Status:    Intimate Partner Violence: Not At Risk (5/20/2025)    Humiliation, Afraid, Rape, and Kick questionnaire     Fear of Current or Ex-Partner: No     Emotionally Abused: No     Physically Abused: No     Sexually Abused: No   Housing Stability: Low Risk  (5/23/2025)    Housing Stability Vital Sign     Unable to Pay for Housing in the Last Year: No     Number of Times Moved in the Last Year: 0     Homeless in the Last Year: No       Review of Systems  Pertinent items are noted in HPI.    Objective       Lab Review  Lab Results   Component Value Date    WBC 7.8 05/27/2025    RBC 4.04 05/27/2025    HGB 12.1 05/27/2025    HCT 37.6 05/27/2025     05/27/2025      Lab Results   Component Value Date    BUN 21 06/12/2025    CREATININE 0.81 06/12/2025        Urine analysis shows +blood       Assessment/Plan   Diagnoses and all orders for this visit:  Nephrolithiasis  -     POCT UA Automated manually resulted      Nephrolithiasis     I personally and independently reviewed images of the renal US from 6/20/2025 which showed resolution of hydronephrosis and no renal stones.     We will follow up annually with  renal US to monitor stone presence and formation.     Will continue to follow up with nephrology for stone prevention.     All questions were answered to the patient's satisfaction. Patient agrees with the plan and wishes to proceed. Follow-up will be scheduled appropriately.     E&M visit today is associated with current or anticipated ongoing medical care services related to a patient's single, serious condition or a complex condition.    Scribed for Dr. Yang by Mecca Jolly. I , Dr Yang, have personally reviewed and agreed with the information entered by the Virtual Scribe.          [1]   Past Medical History:  Diagnosis Date    Abnormal Pap smear of cervix     Allergic DURICEF    Colon polyp     Difficult intubation 03/04/2024    Difficult mask: Oral airway and 2 hand required to BMV. Grade 3 view with mas. 2B with glidescope.    Epilepsy     Fractures     back, knee    Hyperlipidemia     Hypothyroidism     Nephrolithiasis     GLENIS (obstructive sleep apnea)     Pneumonia     Urinary tract infection     Varicella    [2]   Past Surgical History:  Procedure Laterality Date    AUGMENTATION MAMMAPLASTY  3-4-24    CERVICAL BIOPSY  W/ LOOP ELECTRODE EXCISION      COLONOSCOPY      COLONOSCOPY W/ POLYPECTOMY      COSMETIC SURGERY  IMPLANTS    Remote Breast augmentation/implants. Implant replacement 03/04/2024.    CYSTOSCOPY  december and january    ENDOMETRIAL ABLATION      EYE SURGERY      KIDNEY STONE SURGERY  dec. 5, 2024    TONSILLECTOMY     [3]   Family History  Problem Relation Name Age of Onset    Cancer Mother Kinjal Sarmiento     Hypertension Mother Kinjal Plazao     Cancer Father Lane Plazao     Kidney disease Father Lane Sarmiento     Diabetes Maternal Grandmother Kinjal Thrasher     Diabetes Maternal Grandfather Srinivas Kirkpatrick     Cancer Mother Kinjal Plazao     Hypertension Mother Kinjal Plazao     Kidney disease Father Lane Biro     Cancer Father Lane Sarmiento    [4]   Current Outpatient Medications   Medication Sig  Dispense Refill    acetaminophen (Tylenol) 325 mg tablet Take 2 tablets (650 mg) by mouth every 6 hours if needed for mild pain (1 - 3). 30 tablet 0    cholecalciferol (Vitamin D-3) 50 mcg (2,000 units) tablet Take 1 tablet (50 mcg) by mouth once daily at bedtime.      co-enzyme Q-10 30 mg capsule Take 1 capsule (30 mg) by mouth once daily in the morning.      cyanocobalamin (Vitamin B-12) 1,000 mcg tablet Take 1 tablet (1,000 mcg) by mouth once daily at bedtime.      divalproex (Depakote ER) 250 mg 24 hr tablet Take 2 tablets (500 mg) by mouth once daily in the morning AND 1 tablet (250 mg) once daily in the evening. Do not crush, chew, or split.. 270 tablet 3    krill oil 500 mg capsule Take 1 capsule (500 mg) by mouth once daily at bedtime.      magnesium oxide (Mag-Ox) 400 mg tablet Take 1 tablet (400 mg) by mouth once daily. 90 tablet 3    multivitamin tablet Take 1 tablet by mouth once daily at bedtime.      rosuvastatin (Crestor) 10 mg tablet Take 1 tablet (10 mg) by mouth once daily. 100 tablet 3    tamsulosin (Flomax) 0.4 mg 24 hr capsule Take 1 capsule (0.4 mg) by mouth once daily. 30 capsule 0     No current facility-administered medications for this visit.   [5]   Allergies  Allergen Reactions    Chlorpheniramine-Pseudoephed Hives    Iodine Hives and Swelling    Cefadroxil Itching and Rash

## 2025-07-09 ENCOUNTER — APPOINTMENT (OUTPATIENT)
Dept: PRIMARY CARE | Facility: CLINIC | Age: 65
End: 2025-07-09
Payer: MEDICARE

## 2025-07-09 VITALS
HEIGHT: 67 IN | HEART RATE: 74 BPM | BODY MASS INDEX: 30.92 KG/M2 | OXYGEN SATURATION: 96 % | WEIGHT: 197 LBS | SYSTOLIC BLOOD PRESSURE: 133 MMHG | DIASTOLIC BLOOD PRESSURE: 80 MMHG

## 2025-07-09 DIAGNOSIS — Z00.00 MEDICARE ANNUAL WELLNESS VISIT, SUBSEQUENT: ICD-10-CM

## 2025-07-09 DIAGNOSIS — Z13.220 SCREENING FOR HYPERLIPIDEMIA: ICD-10-CM

## 2025-07-09 DIAGNOSIS — Z00.00 ROUTINE GENERAL MEDICAL EXAMINATION AT HEALTH CARE FACILITY: Primary | ICD-10-CM

## 2025-07-09 DIAGNOSIS — N20.0 NEPHROLITHIASIS: ICD-10-CM

## 2025-07-09 DIAGNOSIS — E78.2 MIXED HYPERLIPIDEMIA: ICD-10-CM

## 2025-07-09 PROCEDURE — 1123F ACP DISCUSS/DSCN MKR DOCD: CPT | Performed by: PHYSICIAN ASSISTANT

## 2025-07-09 PROCEDURE — G0444 DEPRESSION SCREEN ANNUAL: HCPCS | Performed by: PHYSICIAN ASSISTANT

## 2025-07-09 PROCEDURE — G0439 PPPS, SUBSEQ VISIT: HCPCS | Performed by: PHYSICIAN ASSISTANT

## 2025-07-09 PROCEDURE — 3008F BODY MASS INDEX DOCD: CPT | Performed by: PHYSICIAN ASSISTANT

## 2025-07-09 PROCEDURE — 1158F ADVNC CARE PLAN TLK DOCD: CPT | Performed by: PHYSICIAN ASSISTANT

## 2025-07-09 PROCEDURE — 1159F MED LIST DOCD IN RCRD: CPT | Performed by: PHYSICIAN ASSISTANT

## 2025-07-09 PROCEDURE — 1160F RVW MEDS BY RX/DR IN RCRD: CPT | Performed by: PHYSICIAN ASSISTANT

## 2025-07-09 PROCEDURE — 1170F FXNL STATUS ASSESSED: CPT | Performed by: PHYSICIAN ASSISTANT

## 2025-07-09 PROCEDURE — 99214 OFFICE O/P EST MOD 30 MIN: CPT | Performed by: PHYSICIAN ASSISTANT

## 2025-07-09 PROCEDURE — G0447 BEHAVIOR COUNSEL OBESITY 15M: HCPCS | Performed by: PHYSICIAN ASSISTANT

## 2025-07-09 PROCEDURE — 1036F TOBACCO NON-USER: CPT | Performed by: PHYSICIAN ASSISTANT

## 2025-07-09 ASSESSMENT — ENCOUNTER SYMPTOMS
BACK PAIN: 0
WHEEZING: 0
ALLERGIC/IMMUNOLOGIC NEGATIVE: 1
HEMATOLOGIC/LYMPHATIC NEGATIVE: 1
LOSS OF SENSATION IN FEET: 0
CONSTITUTIONAL NEGATIVE: 1
DIZZINESS: 0
HEADACHES: 0
NAUSEA: 0
VOMITING: 0
DEPRESSION: 0
MUSCULOSKELETAL NEGATIVE: 1
SHORTNESS OF BREATH: 0
NERVOUS/ANXIOUS: 0
RESPIRATORY NEGATIVE: 1
EYES NEGATIVE: 1
NEUROLOGICAL NEGATIVE: 1
ENDOCRINE NEGATIVE: 1
PSYCHIATRIC NEGATIVE: 1
OCCASIONAL FEELINGS OF UNSTEADINESS: 0
ABDOMINAL PAIN: 0
ARTHRALGIAS: 0
PALPITATIONS: 0
COUGH: 0

## 2025-07-09 ASSESSMENT — PATIENT HEALTH QUESTIONNAIRE - PHQ9
2. FEELING DOWN, DEPRESSED OR HOPELESS: NOT AT ALL
2. FEELING DOWN, DEPRESSED OR HOPELESS: NOT AT ALL
SUM OF ALL RESPONSES TO PHQ9 QUESTIONS 1 AND 2: 0
SUM OF ALL RESPONSES TO PHQ9 QUESTIONS 1 AND 2: 0
1. LITTLE INTEREST OR PLEASURE IN DOING THINGS: NOT AT ALL
1. LITTLE INTEREST OR PLEASURE IN DOING THINGS: NOT AT ALL

## 2025-07-09 ASSESSMENT — ACTIVITIES OF DAILY LIVING (ADL)
TAKING_MEDICATION: INDEPENDENT
GROCERY_SHOPPING: INDEPENDENT
DRESSING: INDEPENDENT
BATHING: INDEPENDENT
MANAGING_FINANCES: INDEPENDENT
DOING_HOUSEWORK: INDEPENDENT

## 2025-07-09 NOTE — ASSESSMENT & PLAN NOTE
crestor 10mg   -Encouraged following a low-fat low-cholesterol diet   -Discussed the benefits of regular aerobic exercise and weight loss  -Encouraged following a low-carb healthy oil intake diet

## 2025-07-09 NOTE — PROGRESS NOTES
Subjective   Reason for Visit: Sofya Dean is an 65 y.o. female here for a Medicare Wellness visit.     Past Medical, Surgical, and Family History reviewed and updated in chart.    Reviewed all medications by prescribing practitioner or clinical pharmacist (such as prescriptions, OTCs, herbal therapies and supplements) and documented in the medical record.    HPI  Patient Sofya Dean 64 y.o. female is here today to establish care and medicare wellness initial      previous PCP  starr       - works part time window business-  retired [employer]      specialists - gyn - jerod- neurology - epilepsy   UTD dental and vision UTD      PMhx significant medical hx epilepsy -   PShx: breast implants   Social hx: etoh- social-few per month  , no tobacco use, no illicit drug use   Watches diet and exercise- walks   immunizations -pt declines all vaccines -- pt aware of risks   Past medical, surgical, social, and family history all reviewed      patient states feeling well otherwise  denies fever, chills, N/V, headache, dizziness, CP, SOB, palpitations, edema, numbness, tingling, weakness  A chaperone was offered to the patient for the physical exam /  exam and was declined      Colonoscopuy UTD 6/5/2024  Mammogram UTD      Patient Care Team:  Princess Aguiar PA-C as PCP - General (Internal Medicine)  Princess Aguiar PA-C as PCP - O Medicare Advantage PCP  Steve August LPN as Care Manager (Case Management)     Review of Systems   Constitutional: Negative.    HENT: Negative.     Eyes: Negative.    Respiratory: Negative.  Negative for cough, shortness of breath and wheezing.    Cardiovascular:  Negative for chest pain and palpitations.   Gastrointestinal:  Negative for abdominal pain, nausea and vomiting.   Endocrine: Negative.    Genitourinary: Negative.    Musculoskeletal: Negative.  Negative for arthralgias and back pain.   Skin: Negative.  Negative for rash.   Allergic/Immunologic:  "Negative.    Neurological: Negative.  Negative for dizziness and headaches.   Hematological: Negative.    Psychiatric/Behavioral: Negative.  The patient is not nervous/anxious.        Objective   Vitals:  /80 (BP Location: Right arm, Patient Position: Sitting)   Pulse 74   Ht 1.702 m (5' 7\")   Wt 89.4 kg (197 lb)   SpO2 96%   BMI 30.85 kg/m²       Physical Exam  Vitals and nursing note reviewed.   Constitutional:       Appearance: Normal appearance.   Cardiovascular:      Rate and Rhythm: Normal rate and regular rhythm.   Pulmonary:      Effort: Pulmonary effort is normal.      Breath sounds: Normal breath sounds.   Musculoskeletal:         General: Normal range of motion.      Cervical back: Neck supple.   Skin:     General: Skin is warm and dry.   Neurological:      General: No focal deficit present.      Mental Status: She is alert and oriented to person, place, and time.   Psychiatric:         Mood and Affect: Mood normal.         Behavior: Behavior normal.         Thought Content: Thought content normal.         Judgment: Judgment normal.       Assessment & Plan  Routine general medical examination at health care facility    Orders:  •  1 Year Follow Up In Primary Care - Wellness Exam  •  1 Year Follow Up In Primary Care - Wellness Exam; Future    Nephrolithiasis  Sees urology        Mixed hyperlipidemia   crestor 10mg   -Encouraged following a low-fat low-cholesterol diet   -Discussed the benefits of regular aerobic exercise and weight loss  -Encouraged following a low-carb healthy oil intake diet           Medicare annual wellness visit, subsequent  [facility] -   -  patient stable and healthy  -  discussed healthy diet and exercise plan   -  continue medications as directed, SEs, risks and options discussed   -  f/u with specialists as directed   -  UTD on dental and vision exams, f/u as directed   -  Labs ordered today, pt advised to call office when results are available to discuss with provider  " -Vaccinations recommended today: declines all   -Follow-up annual exam in one year  -Colon cancer screening UTD 6/2024- due 2029- jo ann   - mammogram UTD - due 2/2026  -Follow-up in one week to discuss all result       Screening for hyperlipidemia    Orders:  •  Lipid Panel; Future      Depression Screening  5 - 10 minutes were spent screening for depression.    Obesity Counseling  15 - 20 minutes were spent counseling on diet and exercise interventions to address obesity and weight reduction.    Medicare Wellness Billing Compliance Satisfied    *This is a visual tool to show completion of required items on the day of the visit. Green checks will only appear on the date of visit.    Review all medications by prescribing practitioner or clinical pharmacist (such as prescriptions, OTCs, herbal therapies and supplements) documented in the medical record    Past Medical, Surgical, and Family History reviewed and updated in chart    Tobacco Use Reviewed    Alcohol Use Reviewed    Illicit Drug Use Reviewed    PHQ2/9    Falls in Last Year Reviewed    Home Safety Risk Factors Reviewed    Cognitive Impairment Reviewed    Patient Self Assessment and Health Status    Current Diet Reviewed    Exercise Frequency    ADL - Hearing Impairment    ADL - Bathing    ADL - Dressing    ADL - Walks in Home    IADL - Managing Finances    IADL - Grocery Shopping    IADL - Taking Medications    IADL - Doing Housework    Vision Screening

## 2025-07-10 LAB
CHOLEST SERPL-MCNC: 270 MG/DL
CHOLEST/HDLC SERPL: 4.9 (CALC)
HDLC SERPL-MCNC: 55 MG/DL
LDLC SERPL CALC-MCNC: 174 MG/DL (CALC)
NONHDLC SERPL-MCNC: 215 MG/DL (CALC)
TRIGL SERPL-MCNC: 243 MG/DL

## 2025-07-29 ENCOUNTER — APPOINTMENT (OUTPATIENT)
Dept: GENETICS | Facility: CLINIC | Age: 65
End: 2025-07-29
Payer: MEDICARE

## 2025-08-08 LAB
AMMONIA 24H UR-SRATE: 33 MEQ/DAY (ref 14–62)
CA H2 PHOS DIHYD 24H SATFR UR: 2.03
CALCIUM 24H UR-MRATE: 234 MG/DAY
CAOX INDEX 24H UR-RTO: 0.5
CITRATE 24H UR-MRATE: 1006 MG/DAY
CREAT 24H UR-MRATE: 1132 MG/DAY (ref 600–1800)
MAGNESIUM 24H UR-MRATE: 162 MG/DAY
NA URATE 24H SATFR UR: 0.55
OXALATE 24H UR-MRATE: 22 MG/DAY
PATIENT HX: ABNORMAL
PH 24H UR: 7 [PH] (ref 5.5–7)
PHOSPHATE 24H UR-MRATE: 1082 MG/DAY
POTASSIUM 24H UR-SRATE: 54 MEQ/DAY (ref 19–135)
QUEST SUPERSATURATION INDEX WITH RESPECT TO:: ABNORMAL
SODIUM 24H UR-SRATE: 133 MEQ/DAY
SPECIMEN VOL 24H UR: 3.37 L/DAY
SULFATE 24H UR-SRATE: 23 MMOL/DAY
TRI-PHOS 24H SATFR UR: 5.18
URATE 24H SATFR UR: 0.08
URATE 24H UR-MRATE: 499 MG/DAY

## 2025-08-13 ENCOUNTER — APPOINTMENT (OUTPATIENT)
Dept: OBSTETRICS AND GYNECOLOGY | Facility: CLINIC | Age: 65
End: 2025-08-13
Payer: MEDICARE

## 2025-08-13 ENCOUNTER — APPOINTMENT (OUTPATIENT)
Dept: UROLOGY | Facility: CLINIC | Age: 65
End: 2025-08-13
Payer: MEDICARE

## 2025-08-17 LAB
CALCIUM 24H UR-MRATE: NORMAL MG/(24.H)
CALCIUM/CREAT 24H UR: NORMAL MG/G{CREAT}
CREAT 24H UR-MRATE: 1.44 G/24 H (ref 0.5–2.15)
CREAT 24H UR-MRATE: 1.56 G/24 H (ref 0.5–2.15)
CREAT 24H UR-MRATE: NORMAL G/(24.H)
MAGNESIUM 24H UR-MRATE: 165 MG/24 H (ref 18–130)
MAGNESIUM/CREATININE [MASS RATIO] IN 24 HOUR URINE: 114 MG/G CREAT (ref 30–135)
OXALATE 24H UR-MRATE: NORMAL MG/(24.H)
OXALATE/CREAT 24H UR: NORMAL
SODIUM 24H UR-SRATE: NORMAL MMOL/(24.H)
SODIUM/CREAT 24H UR-SRTO: NORMAL
SPECIMEN VOL 24H UR: 3400 ML
SPECIMEN VOL 24H UR: NORMAL L
SPECIMEN VOL 24H UR: NORMAL L

## 2025-08-20 LAB
CALCIUM 24H UR-MRATE: 272 MG/24 H (ref 35–250)
CALCIUM/CREAT 24H UR: 185 MG/G CREAT (ref 30–275)
CREAT 24H UR-MRATE: 1.31 G/24 H (ref 0.5–2.15)
CREAT 24H UR-MRATE: 1.36 G/24 H (ref 0.5–2.15)
CREAT 24H UR-MRATE: 1.44 G/24 H (ref 0.5–2.15)
CREAT 24H UR-MRATE: 1.47 G/24 H (ref 0.5–2.15)
CREAT 24H UR-MRATE: 1.56 G/24 H (ref 0.5–2.15)
MAGNESIUM 24H UR-MRATE: 165 MG/24 H (ref 18–130)
MAGNESIUM/CREATININE [MASS RATIO] IN 24 HOUR URINE: 114 MG/G CREAT (ref 30–135)
OXALATE 24H UR-MRATE: 53.4 MG/24 H (ref 3.6–38)
OXALATE/CREAT 24H UR: 39.1 MG/G CREAT (ref 1.6–37)
SODIUM 24H UR-SRATE: 88 MMOL/24 H (ref 52–380)
SODIUM/CREAT 24H UR-SRTO: 67 MMOL/G CREAT (ref 50–230)
SPECIMEN VOL 24H UR: 3400 ML

## 2025-10-16 ENCOUNTER — APPOINTMENT (OUTPATIENT)
Facility: CLINIC | Age: 65
End: 2025-10-16
Payer: MEDICARE

## 2025-10-17 ENCOUNTER — APPOINTMENT (OUTPATIENT)
Dept: GENETICS | Facility: CLINIC | Age: 65
End: 2025-10-17
Payer: MEDICARE

## 2025-11-18 ENCOUNTER — APPOINTMENT (OUTPATIENT)
Dept: OBSTETRICS AND GYNECOLOGY | Facility: CLINIC | Age: 65
End: 2025-11-18
Payer: MEDICARE

## 2026-07-01 ENCOUNTER — APPOINTMENT (OUTPATIENT)
Dept: UROLOGY | Facility: CLINIC | Age: 66
End: 2026-07-01
Payer: MEDICARE

## 2026-07-09 ENCOUNTER — APPOINTMENT (OUTPATIENT)
Dept: PRIMARY CARE | Facility: CLINIC | Age: 66
End: 2026-07-09
Payer: MEDICARE

## (undated) DEVICE — CATHETER, URETERAL, DUAL LUMEN, 10FR X 50CM

## (undated) DEVICE — IRRIGATION KIT, PUMPING, SINGLE ACTION, SYRINGE, 10 CC

## (undated) DEVICE — PREP TRAY, BASIC

## (undated) DEVICE — GUIDEWIRE, AMPLATZ, SUPER STIFF, 035 STRAIGHT

## (undated) DEVICE — Device

## (undated) DEVICE — VALVE, ENDOSCOPIC, SURESEAL II, SIZE 0-5FR

## (undated) DEVICE — GLOVE, SURGICAL, PROTEXIS MICRO, 7.0, PF, LATEX

## (undated) DEVICE — Y-ADAPTER, GATEWAY ADVANTAGE

## (undated) DEVICE — TUBING, SUCTION, CONNECTING, STERILE 0.25 X 120 IN., LF

## (undated) DEVICE — CATHETER, DUAL LUMEN, UDC/10/50 M

## (undated) DEVICE — MASK, AURASTRAIGHT, LARYN, SIZE 5

## (undated) DEVICE — GUIDEWIRE, SENSOR .038 3CM FLEX TIP, STRAIGHT 150CML

## (undated) DEVICE — BASKET, STONE, RETRIEVAL, NITINOL (4WIRE, 1.9 0 TIP)

## (undated) DEVICE — SCOPE, LITHOVUE SUD ONLY, GLOBAL STANDARD

## (undated) DEVICE — SOLUTION, INJECTION, SODIUM CHLORIDE 9%, 3000ML

## (undated) DEVICE — MASK, AURAGAIN, LARYN, SIZE 4.0

## (undated) DEVICE — MASK, AURASTRAIGHT, LARYN, SIZE 3

## (undated) DEVICE — GUIDEWIRE, DUAL SENSOR, .035 X 150 STRAIGHT,  3CM

## (undated) DEVICE — SYRINGE, 30 CC, LUER LOCK

## (undated) DEVICE — COLLECTION BAG, FLUID, F/STERIS LOOP, STERILE

## (undated) DEVICE — GLOVE, SURGICAL, PROTEXIS PI MICRO, 7.0, PF, LF

## (undated) DEVICE — FIBER, MOSES 200 DFL

## (undated) DEVICE — SOLUTION, IRRIGATION, USP, STERILE WATER, UROLOGICAL, 3000 ML, BAG